# Patient Record
Sex: FEMALE | Race: WHITE | Employment: OTHER | URBAN - METROPOLITAN AREA
[De-identification: names, ages, dates, MRNs, and addresses within clinical notes are randomized per-mention and may not be internally consistent; named-entity substitution may affect disease eponyms.]

---

## 2017-01-03 LAB
ERYTHROCYTE [DISTWIDTH] IN BLOOD BY AUTOMATED COUNT: 14.9 % (ref 11.5–14.5)
HCT VFR BLD AUTO: 31.6 % (ref 36–48)
HEMOGLOBIN: 10.4 G/DL (ref 12–16)
MCH RBC QN AUTO: 30 PG (ref 27–33)
MCHC RBC AUTO-ENTMCNC: 33 G/DL (ref 33–36)
MCV RBC AUTO: 92 FL (ref 80–100)
PLATELET # BLD AUTO: 203 K/UL (ref 150–400)
PMV BLD: 9.8 FL (ref 6.5–12)
RBC # BLD AUTO: 3.45 M/UL (ref 4.2–5.4)
WBC # BLD AUTO: 7.7 K/UL (ref 4.3–10.8)

## 2017-01-04 ENCOUNTER — TRANSFERRED RECORDS (OUTPATIENT)
Dept: HEALTH INFORMATION MANAGEMENT | Facility: CLINIC | Age: 77
End: 2017-01-04

## 2017-01-05 LAB
INR PPP: 1.1 (ref 1–1.2)
PROTHROMBIN TIME: 12.5 SECONDS (ref 10–13)

## 2017-01-06 ENCOUNTER — NURSING HOME VISIT (OUTPATIENT)
Dept: GERIATRICS | Facility: CLINIC | Age: 77
End: 2017-01-06
Payer: MEDICARE

## 2017-01-06 VITALS
WEIGHT: 169 LBS | RESPIRATION RATE: 23 BRPM | OXYGEN SATURATION: 95 % | DIASTOLIC BLOOD PRESSURE: 61 MMHG | SYSTOLIC BLOOD PRESSURE: 147 MMHG | BODY MASS INDEX: 28.12 KG/M2 | TEMPERATURE: 98.6 F | HEART RATE: 60 BPM

## 2017-01-06 DIAGNOSIS — M54.6 ACUTE MIDLINE THORACIC BACK PAIN: ICD-10-CM

## 2017-01-06 DIAGNOSIS — Z95.0 CARDIAC PACEMAKER IN SITU: ICD-10-CM

## 2017-01-06 DIAGNOSIS — E11.59 TYPE 2 DIABETES MELLITUS WITH OTHER CIRCULATORY COMPLICATION, WITHOUT LONG-TERM CURRENT USE OF INSULIN (H): ICD-10-CM

## 2017-01-06 DIAGNOSIS — I48.20 CHRONIC ATRIAL FIBRILLATION (H): ICD-10-CM

## 2017-01-06 DIAGNOSIS — R53.81 PHYSICAL DECONDITIONING: Primary | ICD-10-CM

## 2017-01-06 DIAGNOSIS — Z86.69 HX OF SEIZURE DISORDER: ICD-10-CM

## 2017-01-06 PROCEDURE — 99310 SBSQ NF CARE HIGH MDM 45: CPT | Performed by: NURSE PRACTITIONER

## 2017-01-06 PROCEDURE — 99207 ZZC CDG-CORRECTLY CODED, REVIEWED AND AGREE: CPT | Performed by: NURSE PRACTITIONER

## 2017-01-06 RX ORDER — FOLIC ACID 1 MG/1
1 TABLET ORAL DAILY
COMMUNITY
End: 2017-08-09

## 2017-01-06 RX ORDER — TIMOLOL MALEATE 5 MG/ML
1 SOLUTION/ DROPS OPHTHALMIC 2 TIMES DAILY
COMMUNITY

## 2017-01-06 RX ORDER — ACETAMINOPHEN 325 MG/1
650 TABLET ORAL EVERY 12 HOURS PRN
COMMUNITY
End: 2019-12-30

## 2017-01-06 RX ORDER — GABAPENTIN 300 MG/1
300 CAPSULE ORAL 3 TIMES DAILY
COMMUNITY
End: 2017-07-31

## 2017-01-06 RX ORDER — METOPROLOL TARTRATE 25 MG/1
12.5 TABLET, FILM COATED ORAL 2 TIMES DAILY
COMMUNITY
End: 2017-07-31

## 2017-01-06 RX ORDER — LEVETIRACETAM 500 MG/1
500 TABLET ORAL 2 TIMES DAILY
COMMUNITY

## 2017-01-06 RX ORDER — DIAZEPAM 5 MG
5 TABLET ORAL EVERY 8 HOURS PRN
COMMUNITY
End: 2017-01-18

## 2017-01-06 RX ORDER — LOSARTAN POTASSIUM 50 MG/1
50 TABLET ORAL DAILY
COMMUNITY
End: 2017-07-31

## 2017-01-06 RX ORDER — CYCLOBENZAPRINE HCL 10 MG
10 TABLET ORAL 3 TIMES DAILY PRN
COMMUNITY
End: 2017-07-31

## 2017-01-06 RX ORDER — NITROGLYCERIN 0.4 MG/1
0.4 TABLET SUBLINGUAL EVERY 5 MIN PRN
COMMUNITY

## 2017-01-06 RX ORDER — ASPIRIN 325 MG
325 TABLET ORAL DAILY
COMMUNITY
End: 2017-01-18

## 2017-01-06 RX ORDER — LOPERAMIDE HCL 2 MG
2 CAPSULE ORAL EVERY 4 HOURS PRN
COMMUNITY
End: 2017-07-31

## 2017-01-06 RX ORDER — LORATADINE 10 MG/1
10 TABLET ORAL DAILY PRN
COMMUNITY
End: 2018-05-15

## 2017-01-06 RX ORDER — FUROSEMIDE 40 MG
40 TABLET ORAL DAILY
COMMUNITY
End: 2017-07-31

## 2017-01-06 RX ORDER — LATANOPROST 50 UG/ML
1 SOLUTION/ DROPS OPHTHALMIC AT BEDTIME
COMMUNITY

## 2017-01-06 RX ORDER — DULOXETIN HYDROCHLORIDE 60 MG/1
60 CAPSULE, DELAYED RELEASE ORAL 2 TIMES DAILY
COMMUNITY

## 2017-01-06 RX ORDER — PRAVASTATIN SODIUM 40 MG
40 TABLET ORAL DAILY
COMMUNITY
End: 2017-07-31

## 2017-01-06 NOTE — PROGRESS NOTES
[facility]  PRIMARY CARE PROVIDER AND CLINIC:  [facility] [address] / [facility]*  Chief Complaint   Patient presents with     Hospital F/U       HPI:    Diya Cruz is a 76 year old  (1940),admitted to the [facility]  from [facility].  Hospital stay 12/30/2016 through 1/5/2017.  Admitted to this facility for  rehab, medical management and nursing care. Current issues are:         Physical deconditioning  Chronic atrial fibrillation (H)  Cardiac pacemaker in situ  Type 2 diabetes mellitus with other circulatory complication, without long-term current use of insulin (H)  Acute midline thoracic back pain  Hx of seizure disorder     See assessment / plan for HPI     CODE STATUS/ADVANCE DIRECTIVES DISCUSSION:   DNR only  Patient's living condition: lives in an assisted living facility    ALLERGIES:Ambien; Avandia; Azithromycin; Clindamycin; Codeine; Hydrochlorothiazide w/triamterene; Lisinopril; Nitrofurantoin; Simvastatin; and Sulfa drugs  PAST MEDICAL HISTORY:  has a past medical history of Type II or unspecified type diabetes mellitus without mention of complication, not stated as uncontrolled; Unspecified essential hypertension; Pure hypercholesterolemia; Rheumatoid arthritis(714.0); Peripheral vascular disease, unspecified (H); Unspecified cerebral artery occlusion with cerebral infarction; Obstructive sleep apnea (adult) (pediatric); Unspecified epilepsy without mention of intractable epilepsy; Abdominal aortic stenosis; Depressive disorder; Anxiety; Basal cell carcinoma; Carotid stenosis; Claudication (H); Compression fracture; GI bleed; Glaucoma; Hyperlipidemia; Meniere syndrome; Obesity; Seizure disorder (H); and Sjogren's disease (H).  PAST SURGICAL HISTORY:  has past surgical history that includes tonsillectomy; Hysterectomy; GYN surgery; Breast surgery; Head and neck surgery; Cardiac surgery;  Cardiac surgery; Abdomen surgery; vascular surgery; Phacoemulsification with standard intraocular lens implant (6/26/2014); cataract iol, rt/lt (2010); and Laser YAG capsulotomy (Bilateral, 8/27/2015).  FAMILY HISTORY: family history is not on file.  SOCIAL HISTORY:  reports that she has quit smoking. Her smoking use included Cigarettes. She has a 30 pack-year smoking history. She does not have any smokeless tobacco history on file. She reports that she does not drink alcohol or use illicit drugs.    Post Discharge Medication Reconciliation Status: discharge medications reconciled, continue medications without change.  Current Outpatient Prescriptions   Medication Sig Dispense Refill     acetaminophen (TYLENOL) 325 MG tablet Take 325 mg by mouth every 6 hours as needed for pain       gabapentin (NEURONTIN) 300 MG capsule Take 300 mg by mouth 3 times daily       insulin aspart (NOVOLOG FLEXPEN) 100 UNIT/ML injection Inject Subcutaneous 3 times daily (before meals) Per sliding scale: 120 - 149 = 1 unit, 150 - 199 = 2 units, 200 - 249 = 4 units, 250 - 299 = 6 units, 300 - 349 = 8 units, 350+ = 10 units       insulin aspart (NOVOLOG FLEXPEN) 100 UNIT/ML injection Inject Subcutaneous At Bedtime Per sliding scale: 0 - 149 = 0 units, 150 - 199 = 1 unit, 200 - 249 = 2 units, 250 - 299 = 3 units, 300 - 349 = 4 units, 350+ = 5 units       latanoprost (XALATAN) 0.005 % ophthalmic solution Place 1 drop into both eyes At Bedtime       timolol (TIMOPTIC) 0.5 % ophthalmic solution Place 1 drop into both eyes 2 times daily Hold if HR <60       ranitidine (ZANTAC) 150 MG tablet Take 150 mg by mouth 2 times daily as needed for heartburn       pravastatin (PRAVACHOL) 40 MG tablet Take 40 mg by mouth daily       nitroglycerin (NITROSTAT) 0.4 MG sublingual tablet Place 0.4 mg under the tongue every 5 minutes as needed for chest pain For chest pain place 1 tablet under the tongue every 5 minutes for 3 doses. If symptoms persist 5 minutes  after 1st dose call 911.       losartan (COZAAR) 50 MG tablet Take 50 mg by mouth daily       loperamide (IMODIUM) 2 MG capsule Take 2 mg by mouth every 4 hours as needed for diarrhea       loratadine (CLARITIN) 10 MG tablet Take 10 mg by mouth daily as needed for allergies       levETIRAcetam (KEPPRA) 500 MG tablet Take 500 mg by mouth 2 times daily       furosemide (LASIX) 40 MG tablet Take 40 mg by mouth daily       folic acid (FOLVITE) 1 MG tablet Take 1 mg by mouth daily       DULoxetine (CYMBALTA) 60 MG EC capsule Take 60 mg by mouth 2 times daily       diazepam (VALIUM) 5 MG tablet Take 5 mg by mouth every 8 hours as needed for anxiety or sleep       insulin aspart (NOVOLOG FLEXPEN) 100 UNIT/ML injection Inject 2 Units Subcutaneous 3 times daily (with meals)       metoprolol (LOPRESSOR) 25 MG tablet Take 12.5 mg by mouth 2 times daily       insulin glargine (LANTUS) 100 UNIT/ML injection Inject 5 Units Subcutaneous At Bedtime       cyclobenzaprine (FLEXERIL) 10 MG tablet Take 10 mg by mouth 3 times daily as needed for muscle spasms       aspirin 325 MG tablet Take 325 mg by mouth daily       Warfarin Sodium (COUMADIN PO) Take 6 mg by mouth daily         ROS:  10 point ROS of systems including Constitutional, Eyes, Respiratory, Cardiovascular, Gastroenterology, Genitourinary, Integumentary, Muscularskeletal, Psychiatric were all negative except for pertinent positives noted in my HPI.    Exam:  /61 mmHg  Pulse 60  Temp(Src) 98.6  F (37  C)  Resp 23  Wt 169 lb (76.658 kg)  SpO2 95%  GENERAL APPEARANCE:  Alert, in no distress  RESP:  respiratory effort and palpation of chest normal, no respiratory distress, lungs sounds clear  CV:  Palpation and auscultation of heart done , regular rate and rhythm, no murmur, rub, or gallop, edema none  ABDOMEN:  normal bowel sounds, soft, nontender, no hepatosplenomegaly or other masses  M/S:  Gait and station w/c working with therapy, no tenderness or swelling of  the joints   SKIN:  Bilateral chest incisions overed with steristrips no obvious signs of infection  PSYCH:  insight and judgement, memory intact, affect and mood normal    Lab/Diagnostic data:  WBC   Date Value Ref Range Status   01/03/2017 7.7 4.3 - 10.8 K/uL Final   ]  HEMOGLOBIN   Date Value Ref Range Status   01/03/2017 10.4* 12.0 - 16.0 g/dL Final     ASSESSMENT/PLAN:  Physical deconditioning  Due to hospital stay and hx of falls with syncopal episodes    Chronic atrial fibrillation (H)  Cardiac pacemaker in situ  Pacemaker placed at [facility] due to sick sinus syndrome with ventricular fibrillation, and bradycardia into the 30s. Heart Rate consistently 60. Hx of CVA. On low dose metoprolol and warfarin for stroke prevention     Type 2 diabetes mellitus with other circulatory complication, without long-term current use of insulin (H)  Not well controlled at baseline per patient report. Currently on lantus 5 units and novlog per sliding scale.   Home regimen is lantus 5 units once daily and novolog per carb.   Blood sugars controlled. No lows.    Acute midline thoracic back pain  Hx of compression fracture with one her falls. Pain currently managed with gabapentin and flexeril.   - no changes     Hx of seizure disorder  Maintained on keppra for hx of seizures following remicade infusions.       Orders:  No changes      Electronically signed by:  Manda Molina NP

## 2017-01-10 ENCOUNTER — NURSING HOME VISIT (OUTPATIENT)
Dept: GERIATRICS | Facility: CLINIC | Age: 77
End: 2017-01-10
Payer: MEDICARE

## 2017-01-10 VITALS
WEIGHT: 169 LBS | TEMPERATURE: 96.8 F | HEART RATE: 60 BPM | SYSTOLIC BLOOD PRESSURE: 138 MMHG | BODY MASS INDEX: 28.12 KG/M2 | DIASTOLIC BLOOD PRESSURE: 68 MMHG | RESPIRATION RATE: 20 BRPM

## 2017-01-10 DIAGNOSIS — E11.59 TYPE 2 DIABETES MELLITUS WITH OTHER CIRCULATORY COMPLICATION, WITHOUT LONG-TERM CURRENT USE OF INSULIN (H): Primary | ICD-10-CM

## 2017-01-10 PROCEDURE — 99207 ZZC CDG-CORRECTLY CODED, REVIEWED AND AGREE: CPT | Performed by: NURSE PRACTITIONER

## 2017-01-10 PROCEDURE — 99308 SBSQ NF CARE LOW MDM 20: CPT | Performed by: NURSE PRACTITIONER

## 2017-01-10 NOTE — PROGRESS NOTES
[facility]    Chief Complaint   Patient presents with     Nursing Home Acute       HPI:    Diya Cruz is a 76 year old  (1940), who is being seen today for an episodic care visit at [facility]. Today's concern is:  1. Type 2 diabetes mellitus with other circulatory complication, without long-term current use of insulin (H)    Historically per patient blood sugars are all over the place during the day.  Reports they range between 70-80's in the morning and are close to what they are running currently.  Eileens ranges at the TCU are in the -259, 12p 176-404, 5p 104-217, and -217.    REVIEW OF SYSTEMS:  4 point ROS including Respiratory, CV, GI and , other than that noted in the HPI,  is negative    /68 mmHg  Pulse 60  Temp(Src) 96.8  F (36  C) (Tympanic)  Resp 20  Wt 169 lb (76.658 kg)  GENERAL APPEARANCE:  Alert, in no distress    ASSESSMENT/PLAN:  (E11.59) Type 2 diabetes mellitus with other circulatory complication, without long-term current use of insulin (H)  (primary encounter diagnosis)  Comment: Higher blood sugar ranges in the AM and at noon  Plan:  -Increase Lantus to 7 units SQ at bedtime  -Increase scheduled Novolog to 4 units SQ TID with meals      Time: 15 minutes    Yeimi GALLO    Provider Disclosure:  This patient was seen along with a nurse practitioner student. The histories and reviews of systems were obtained by them and confirmed by myself. All objective, assessments and plans were completed by myself.    Electronically signed by:  Manda Molina NP

## 2017-01-11 ENCOUNTER — NURSING HOME VISIT (OUTPATIENT)
Dept: FAMILY MEDICINE | Facility: OTHER | Age: 77
End: 2017-01-11
Payer: MEDICARE

## 2017-01-11 VITALS
SYSTOLIC BLOOD PRESSURE: 131 MMHG | HEART RATE: 62 BPM | TEMPERATURE: 98 F | WEIGHT: 169 LBS | RESPIRATION RATE: 21 BRPM | OXYGEN SATURATION: 95 % | DIASTOLIC BLOOD PRESSURE: 76 MMHG | BODY MASS INDEX: 28.12 KG/M2

## 2017-01-11 DIAGNOSIS — I10 ESSENTIAL HYPERTENSION: ICD-10-CM

## 2017-01-11 DIAGNOSIS — G40.909 SEIZURE DISORDER (H): ICD-10-CM

## 2017-01-11 DIAGNOSIS — I49.5 SICK SINUS SYNDROME (H): Primary | ICD-10-CM

## 2017-01-11 DIAGNOSIS — F41.9 ANXIETY: ICD-10-CM

## 2017-01-11 DIAGNOSIS — E11.65 TYPE 2 DIABETES MELLITUS WITH HYPERGLYCEMIA, UNSPECIFIED LONG TERM INSULIN USE STATUS: ICD-10-CM

## 2017-01-11 PROCEDURE — 99305 1ST NF CARE MODERATE MDM 35: CPT | Performed by: FAMILY MEDICINE

## 2017-01-11 RX ORDER — ACETAMINOPHEN 500 MG
1000 TABLET ORAL 3 TIMES DAILY
COMMUNITY
End: 2017-07-31

## 2017-01-13 PROBLEM — I49.5 SICK SINUS SYNDROME (H): Status: ACTIVE | Noted: 2017-01-13

## 2017-01-13 NOTE — ASSESSMENT & PLAN NOTE
Sugars fluctuate from 100-400s, insulin was just increased yesterday, continue to monitor and adjust insulin as needed.

## 2017-01-13 NOTE — ASSESSMENT & PLAN NOTE
Pacemaker placed at [facility] due to sick sinus syndrome with ventricular fibrillation, and bradycardia into the 30s. Heart Rate consistently 60. Hx of CVA. On low dose metoprolol and warfarin for stroke prevention.

## 2017-01-13 NOTE — PROGRESS NOTES
HISTORY OF PRESENT ILLNESS:  Diya is a 76 year old female, (1940), admitted to [facility] from [facility].  Hospital stay 12/30/2016 through 1/5/2017.  Admitted to this facility for  rehab, medical management and nursing care.     Past Medical History/  Patient Active Problem List    Diagnosis Date Noted     Sick sinus syndrome (H) 01/13/2017     Priority: Medium     Nondiabetic proliferative retinopathy 08/08/2016     Priority: Medium     Chronic back pain 03/29/2016     Priority: Medium     Multiple-type hyperlipidemia 01/26/2016     Priority: Medium     Mass of breast 07/23/2015     Priority: Medium     Fatigue 07/22/2015     Priority: Medium     Abnormal computed tomography scan 07/10/2015     Priority: Medium     Overview:   Lung nodules. Following with Dr. Pennington, due for repeat CT 1/2016       Stenosis of carotid artery 06/12/2015     Priority: Medium     Candidal intertrigo 04/10/2015     Priority: Medium     Microalbuminuria 01/26/2015     Priority: Medium     Mild episode of recurrent major depressive disorder (H) 01/26/2015     Priority: Medium     Type 2 diabetes mellitus (H) 01/26/2015     Priority: Medium     Essential hypertension 01/23/2015     Priority: Medium     Spinal stenosis of lumbar region 01/23/2015     Priority: Medium     History of adenomatous polyp of colon 11/22/2013     Priority: Medium     History of endovascular stent graft for abdominal aortic aneurysm 11/22/2013     Priority: Medium     Anxiety 10/22/2013     Priority: Medium     Paroxysmal atrial fibrillation (H) 09/18/2013     Priority: Medium     Overview:   On coumadin       Seasonal allergic rhinitis 09/03/2013     Priority: Medium     Seizure disorder (H) 09/03/2013     Priority: Medium     Overview:   Possible reaction to Remicade, takes Keppra       Cerebrovascular accident (CVA) (H) 08/19/2013     Priority: Medium     Glaucoma 08/19/2013     Priority: Medium     Hypoferremia  08/19/2013     Priority: Medium     Overview:   Source in distal small bowel per patient (identified on pill cam). History transfusions.       Obstructive sleep apnea syndrome 08/19/2013     Priority: Medium     Overview:   Unable to tolerate CPAP       Peripheral arterial occlusive disease (H) 08/19/2013     Priority: Medium     Overview:   8/8/01 - 18-mm infrarenal aortic stent with angioplasty   8/23/01, Angioplasty Rt prox SFA, thrombolysis Rt CFA and SFA.  7/14/11 - bilateral GLENN stents       Rheumatoid arthritis (H) 08/19/2013     Priority: Medium     Overview:   Dr. Trevino            Social History     Social History     Marital Status:      Spouse Name: N/A     Number of Children: N/A     Years of Education: N/A     Occupational History     Not on file.     Social History Main Topics     Smoking status: Former Smoker -- 1.00 packs/day for 30 years     Types: Cigarettes     Smokeless tobacco: Not on file     Alcohol Use: No     Drug Use: No     Sexual Activity: No     Other Topics Concern     Not on file     Social History Narrative        Current Outpatient Prescriptions   Medication Sig     acetaminophen (TYLENOL) 500 MG tablet Take 1,000 mg by mouth 3 times daily     acetaminophen (TYLENOL) 325 MG tablet Take 325 mg by mouth every 6 hours as needed for pain     gabapentin (NEURONTIN) 300 MG capsule Take 300 mg by mouth 3 times daily     insulin aspart (NOVOLOG FLEXPEN) 100 UNIT/ML injection Inject Subcutaneous 3 times daily (before meals) Per sliding scale: 120 - 149 = 1 unit, 150 - 199 = 2 units, 200 - 249 = 4 units, 250 - 299 = 6 units, 300 - 349 = 8 units, 350+ = 10 units     insulin aspart (NOVOLOG FLEXPEN) 100 UNIT/ML injection Inject Subcutaneous At Bedtime Per sliding scale: 0 - 149 = 0 units, 150 - 199 = 1 unit, 200 - 249 = 2 units, 250 - 299 = 3 units, 300 - 349 = 4 units, 350+ = 5 units     latanoprost (XALATAN) 0.005 % ophthalmic solution Place 1 drop into both eyes At Bedtime     timolol  (TIMOPTIC) 0.5 % ophthalmic solution Place 1 drop into both eyes 2 times daily Hold if HR <60     ranitidine (ZANTAC) 150 MG tablet Take 150 mg by mouth 2 times daily as needed for heartburn     pravastatin (PRAVACHOL) 40 MG tablet Take 40 mg by mouth daily     nitroglycerin (NITROSTAT) 0.4 MG sublingual tablet Place 0.4 mg under the tongue every 5 minutes as needed for chest pain For chest pain place 1 tablet under the tongue every 5 minutes for 3 doses. If symptoms persist 5 minutes after 1st dose call 911.     losartan (COZAAR) 50 MG tablet Take 50 mg by mouth daily     loperamide (IMODIUM) 2 MG capsule Take 2 mg by mouth every 4 hours as needed for diarrhea     loratadine (CLARITIN) 10 MG tablet Take 10 mg by mouth daily as needed for allergies     levETIRAcetam (KEPPRA) 500 MG tablet Take 500 mg by mouth 2 times daily     furosemide (LASIX) 40 MG tablet Take 40 mg by mouth daily     folic acid (FOLVITE) 1 MG tablet Take 1 mg by mouth daily     DULoxetine (CYMBALTA) 60 MG EC capsule Take 60 mg by mouth 2 times daily     diazepam (VALIUM) 5 MG tablet Take 5 mg by mouth every 8 hours as needed for anxiety or sleep     insulin aspart (NOVOLOG FLEXPEN) 100 UNIT/ML injection Inject 4 Units Subcutaneous 3 times daily (with meals)      metoprolol (LOPRESSOR) 25 MG tablet Take 12.5 mg by mouth 2 times daily     insulin glargine (LANTUS) 100 UNIT/ML injection Inject 7 Units Subcutaneous At Bedtime      cyclobenzaprine (FLEXERIL) 10 MG tablet Take 10 mg by mouth 3 times daily as needed for muscle spasms     aspirin 325 MG tablet Take 325 mg by mouth daily     Warfarin Sodium (COUMADIN PO) Take 6 mg by mouth daily     No current facility-administered medications for this visit.       Allergies   Allergen Reactions     Ambien [Zolpidem] Other (See Comments)     Avandia [Rosiglitazone] Hives     Azithromycin Diarrhea     Clindamycin Diarrhea     Codeine Nausea and Vomiting     Hydrochlorothiazide W/Triamterene Swelling      Lisinopril Cough     Nitrofurantoin Diarrhea     Simvastatin Diarrhea     Sulfa Drugs      Lightheaded, GI upset       I have reviewed the care plan and do agree with the plan.    Information reviewed:  Medications, vital signs, orders, and nursing notes.    ROS:  No chest pain, shortness of breath, fevers, chills, headache, nausea, vomiting, dysuria or bowel abnormalities.  Appetite is  normal.  She feels her strength is improving.    OBJECTIVE:  /76 mmHg  Pulse 62  Temp(Src) 98  F (36.7  C)  Resp 21  Wt 169 lb (76.658 kg)  SpO2 95%  GENERAL APPEARANCE:  Alert, in no distress  RESP:  respiratory effort normal, no respiratory distress, lungs sounds clear  CV:  regular rate and rhythm, no murmur, pacemaker incision site clean/dry/intact without erythema or discharge, edema none  ABDOMEN:  normal bowel sounds, soft, nontender, no masses  M/S:  Gait and station w/c working with therapy, no tenderness or swelling of the joints    PSYCH:  insight and judgement, memory intact, affect and mood normal    ASSESSMENT/PLAN:    Sick sinus syndrome (H)  Pacemaker placed at [facility] due to sick sinus syndrome with ventricular fibrillation, and bradycardia into the 30s. Heart Rate consistently 60. Hx of CVA. On low dose metoprolol and warfarin for stroke prevention.    Type 2 diabetes mellitus (H)  Sugars fluctuate from 100-400s, insulin was just increased yesterday, continue to monitor and adjust insulin as needed.    Anxiety  Stable on Cymbalta.    Essential hypertension  Controlled on current medication.    Seizure disorder (H)  Maintained on keppra for hx of seizures following remicade infusions.        Total time spent with patient visit was 25 min including patient visit, review of current treatment plans, and review of past records.     Aixa Esparza MD

## 2017-01-18 ENCOUNTER — DISCHARGE SUMMARY NURSING HOME (OUTPATIENT)
Dept: GERIATRICS | Facility: CLINIC | Age: 77
End: 2017-01-18
Payer: MEDICARE

## 2017-01-18 VITALS
HEART RATE: 60 BPM | BODY MASS INDEX: 28.29 KG/M2 | TEMPERATURE: 98.9 F | RESPIRATION RATE: 14 BRPM | DIASTOLIC BLOOD PRESSURE: 73 MMHG | WEIGHT: 170 LBS | SYSTOLIC BLOOD PRESSURE: 169 MMHG

## 2017-01-18 DIAGNOSIS — E11.59 TYPE 2 DIABETES MELLITUS WITH OTHER CIRCULATORY COMPLICATION, WITHOUT LONG-TERM CURRENT USE OF INSULIN (H): ICD-10-CM

## 2017-01-18 DIAGNOSIS — I48.20 CHRONIC ATRIAL FIBRILLATION (H): ICD-10-CM

## 2017-01-18 DIAGNOSIS — Z86.69 HX OF SEIZURE DISORDER: ICD-10-CM

## 2017-01-18 DIAGNOSIS — R53.81 PHYSICAL DECONDITIONING: Primary | ICD-10-CM

## 2017-01-18 DIAGNOSIS — I10 ESSENTIAL HYPERTENSION: ICD-10-CM

## 2017-01-18 DIAGNOSIS — M54.6 ACUTE MIDLINE THORACIC BACK PAIN: ICD-10-CM

## 2017-01-18 DIAGNOSIS — Z95.0 CARDIAC PACEMAKER IN SITU: ICD-10-CM

## 2017-01-18 PROCEDURE — 99207 ZZC CDG-CORRECTLY CODED, REVIEWED AND AGREE: CPT | Performed by: NURSE PRACTITIONER

## 2017-01-18 PROCEDURE — 99316 NF DSCHRG MGMT 30 MIN+: CPT | Performed by: NURSE PRACTITIONER

## 2017-01-18 RX ORDER — METOPROLOL TARTRATE 25 MG/1
12.5 TABLET, FILM COATED ORAL 2 TIMES DAILY
Qty: 60 TABLET | Refills: 0 | Status: SHIPPED | OUTPATIENT
Start: 2017-01-18 | End: 2017-07-31

## 2017-01-18 RX ORDER — CYCLOBENZAPRINE HCL 10 MG
10 TABLET ORAL 3 TIMES DAILY PRN
Qty: 42 TABLET | Refills: 0 | Status: SHIPPED | OUTPATIENT
Start: 2017-01-18 | End: 2017-07-31

## 2017-01-18 NOTE — PROGRESS NOTES
[facility] DISCHARGE SUMMARY    PATIENT'S NAME: Diya Cruz  YOB: 1940  MEDICAL RECORD NUMBER:  [id number]    PRIMARY CARE PROVIDER AND CLINIC RESPONSIBLE AFTER TRANSFER: Krystle Sen [facility] [address] / [place]*     CODE STATUS/ADVANCE DIRECTIVES DISCUSSION:   DNR       Allergies   Allergen Reactions     Ambien [Zolpidem] Other (See Comments)     Avandia [Rosiglitazone] Hives     Azithromycin Diarrhea     Clindamycin Diarrhea     Codeine Nausea and Vomiting     Hydrochlorothiazide W/Triamterene Swelling     Lisinopril Cough     Nitrofurantoin Diarrhea     Simvastatin Diarrhea     Sulfa Drugs      Lightheaded, GI upset       TRANSFERRING PROVIDERS: Manda Molina NP, Aixa Esparza MD  DATE OF SNF ADMISSION:  January / 05 / 2017  DATE OF SNF (anticipated) DISCHARGE: January / 21 / 2017  DISCHARGE DISPOSITION: Non-FMG Provider   Nursing Facility: [facility] stay 12/30/2016 to 1/5/2017.     Condition on Discharge:  Stable.  Function:  Ambulates   Cognitive Scores: bims 15/15  Equipment: walker    DISCHARGE DIAGNOSIS:   1. Physical deconditioning    2. Chronic atrial fibrillation (H)    3. Cardiac pacemaker in situ    4. Type 2 diabetes mellitus with other circulatory complication, without long-term current use of insulin (H)    5. Acute midline thoracic back pain    6. Hx of seizure disorder    7. Essential hypertension        HPI Nursing Facility Course:    Physical deconditioning  Frequent falls with sick sinus syndrome  Will discharge to home with Physical Therapy/ Ocupational Therapy /RN / HHA    Chronic atrial fibrillation (H)  Rate controlled with metoprolol - script sent to pharmacy as she was on a higher dose at home.  Warfarin dosing has been stable at her previous home dose of 5 mg every day.  LAST few INR: 1/16 - 2.6  1/8 2.0  NEXT INR DUE: 1/23    Cardiac pacemaker in  situ  Hx of sick sinus syndrome  - follow up as directed    Type 2 diabetes mellitus with other circulatory complication, without long-term current use of insulin (H)  Currently on 7 units once daily SQ of lantus and sliding scale insulin  - OK to go back to her previous regimen.  - check  Blood sugars four times daily, keep a log and discuss it with your primary care provider    Acute midline thoracic back pain  Hx of compression fracture, script for flexeril sent to pharmacy.   - continue gabapentin - no changes     Hx of seizure disorder  No seizure while at TCU  - Continues on keppra    Hypertension  Blood pressures controled with metoprolol, lasix and losartan  - no changes made    PAST MEDICAL HISTORY:  has a past medical history of Type II or unspecified type diabetes mellitus without mention of complication, not stated as uncontrolled; Unspecified essential hypertension; Pure hypercholesterolemia; Rheumatoid arthritis(714.0); Peripheral vascular disease, unspecified (H); Unspecified cerebral artery occlusion with cerebral infarction; Obstructive sleep apnea (adult) (pediatric); Unspecified epilepsy without mention of intractable epilepsy; Abdominal aortic stenosis; Depressive disorder; Anxiety; Basal cell carcinoma; Carotid stenosis; Claudication (H); Compression fracture; GI bleed; Glaucoma; Hyperlipidemia; Meniere syndrome; Obesity; Seizure disorder (H); and Sjogren's disease (H).    DISCHARGE MEDICATIONS:  Current Outpatient Prescriptions   Medication Sig Dispense Refill     cyclobenzaprine (FLEXERIL) 10 MG tablet Take 1 tablet (10 mg) by mouth 3 times daily as needed for muscle spasms 42 tablet 0     metoprolol (LOPRESSOR) 25 MG tablet Take 0.5 tablets (12.5 mg) by mouth 2 times daily 60 tablet 0     acetaminophen (TYLENOL) 500 MG tablet Take 1,000 mg by mouth 3 times daily       acetaminophen (TYLENOL) 325 MG tablet Take 325 mg by mouth every 6 hours as needed for pain       gabapentin (NEURONTIN) 300  MG capsule Take 300 mg by mouth 3 times daily       insulin aspart (NOVOLOG FLEXPEN) 100 UNIT/ML injection Inject Subcutaneous 3 times daily (before meals) Per sliding scale: 120 - 149 = 1 unit, 150 - 199 = 2 units, 200 - 249 = 4 units, 250 - 299 = 6 units, 300 - 349 = 8 units, 350+ = 10 units       insulin aspart (NOVOLOG FLEXPEN) 100 UNIT/ML injection Inject Subcutaneous At Bedtime Per sliding scale: 0 - 149 = 0 units, 150 - 199 = 1 unit, 200 - 249 = 2 units, 250 - 299 = 3 units, 300 - 349 = 4 units, 350+ = 5 units       latanoprost (XALATAN) 0.005 % ophthalmic solution Place 1 drop into both eyes At Bedtime       timolol (TIMOPTIC) 0.5 % ophthalmic solution Place 1 drop into both eyes 2 times daily Hold if HR <60       ranitidine (ZANTAC) 150 MG tablet Take 150 mg by mouth 2 times daily as needed for heartburn       pravastatin (PRAVACHOL) 40 MG tablet Take 40 mg by mouth daily       nitroglycerin (NITROSTAT) 0.4 MG sublingual tablet Place 0.4 mg under the tongue every 5 minutes as needed for chest pain For chest pain place 1 tablet under the tongue every 5 minutes for 3 doses. If symptoms persist 5 minutes after 1st dose call 911.       losartan (COZAAR) 50 MG tablet Take 50 mg by mouth daily       loperamide (IMODIUM) 2 MG capsule Take 2 mg by mouth every 4 hours as needed for diarrhea       loratadine (CLARITIN) 10 MG tablet Take 10 mg by mouth daily as needed for allergies       levETIRAcetam (KEPPRA) 500 MG tablet Take 500 mg by mouth 2 times daily       furosemide (LASIX) 40 MG tablet Take 40 mg by mouth daily       folic acid (FOLVITE) 1 MG tablet Take 1 mg by mouth daily       DULoxetine (CYMBALTA) 60 MG EC capsule Take 60 mg by mouth 2 times daily       insulin aspart (NOVOLOG FLEXPEN) 100 UNIT/ML injection Inject 4 Units Subcutaneous 3 times daily (with meals)        metoprolol (LOPRESSOR) 25 MG tablet Take 12.5 mg by mouth 2 times daily       insulin glargine (LANTUS) 100 UNIT/ML injection Inject 7  Units Subcutaneous At Bedtime        cyclobenzaprine (FLEXERIL) 10 MG tablet Take 10 mg by mouth 3 times daily as needed for muscle spasms       Warfarin Sodium (COUMADIN PO) Take 6 mg by mouth daily         MEDICATION CHANGES/RATIONALE:   Lantus increased from 5 units to 7 units due to elevated blood sugars  Valium discontinued due to duplicate therapy and not using  D/C aspirin - on warfarin    Controlled medications sent with patient: not applicable/none     ROS:    4 point ROS including Respiratory, CV, GI and , other than that noted in the HPI,  is negative    Physical Exam:   Vitals: /73 mmHg  Pulse 60  Temp(Src) 98.9  F (37.2  C)  Resp 14  Wt 170 lb (77.111 kg)  BMI= Body mass index is 28.29 kg/(m^2).    GENERAL APPEARANCE:  Alert, in no distress    DISCHARGE PLAN:  Occupational Therapy, Physical Therapy, Registered Nurse and Home Health Aide  Patient instructed to follow-up with:  PCP in 7 days      [facility] scheduled appointments:      Pending labs: None  SNF labs:  WBC      7.7   1/3/2017  RBC     3.45   1/3/2017  HGB     10.4   1/3/2017  HCT     31.6   1/3/2017  MCV       92   1/3/2017  MCH       30   1/3/2017  MCHC       33   1/3/2017  RDW     14.9   1/3/2017  PLT      203   1/3/2017       Discharge Treatments:  1. D/C Valium  2. D/C Aspirin  3. Scripts for Flexeril and Metoprolol sent to [facility] in [place]  4. Ok to discharge to home with current medications and treatments  5. Follow up with primary care provider in 1 week    TOTAL DISCHARGE TIME:   Greater than 30 minutes  Electronically signed by:  Manda Molina NP

## 2017-01-24 ENCOUNTER — TELEPHONE (OUTPATIENT)
Dept: FAMILY MEDICINE | Facility: OTHER | Age: 77
End: 2017-01-24

## 2017-01-26 ENCOUNTER — CARE COORDINATION (OUTPATIENT)
Dept: CARE COORDINATION | Facility: CLINIC | Age: 77
End: 2017-01-26

## 2017-01-26 NOTE — PROGRESS NOTES
Clinic Care Coordination Contact  [facility]/Voicemail    Referral Source: IP/TCU Report    Clinical Data: Care Coordinator Outreach:  Pt discharged from [facility] on 1/21/17.  Pt with a  Complex medical history significant for chronic a-fib, cardiac pacemaker, type II diabetes, seizure disorder, HTN.  Per chart review pt discharged home to an indpendent [facility] with home care services - RN, PT, OT and HHA.   Noted per chart that this patient has not been seen at the [facility] so will need to confirm PCP/Clinic.      Outreach attempted x 1.  Left message on voicemail with call back information and requested return call.    Plan: Care Coordinator will mail out care coordination introduction letter with care coordinator contact information and explanation of care coordination services. Care Coordinator will try to reach patient again in 1-2 business days.      Yael Francois RN BSN, PHN RN Care Coordinator  [facility]  [email or web address]  [phone]  1/26/2017 11:19 AM

## 2017-01-26 NOTE — Clinical Note
[address]  [address]    January 26, 2017    Diya Cruz  [address]  [place] [address]    Dear Diya,  I am the Clinic Care Coordinator that works with your primary care provider's clinic. I have been trying to reach you recently to introduce Clinic Care Coordination and to see if there was anything I could assist you with.  Below is a description of what Clinic Care Coordination is and how I can further assist you.     The Clinic Care Coordinator role is a Registered Nurse and/or  who understands the health care system. The goal of Clinic Care Coordination is to help you manage your health and improve access to the [facility] in the most efficient manner.  The Registered Nurse can assist you in meeting your health care goals by providing education, coordinating services, and strengthening the communication among your providers. The  can assist you with financial, behavioral, psychosocial, and chemical dependency and counseling/psychiatric resources.    Please feel free to keep this letter and contact information to contact me at [phone] with any further questions or concerns that may arise. We at [facility] are focused on providing you with the highest-quality healthcare experience possible and that all starts with you.       Sincerely,     Yael Francois, RN BSN, PHN RN Care Coordinator  [facility] [place]  [email or web address]  [phone]  1/26/2017 11:21 AM      Enclosed: I have enclosed a copy of a 24 Hour Access Plan. This has helpful phone numbers for you to call when needed. Please keep this in an easy to access place to use as needed.

## 2017-01-26 NOTE — Clinical Note
My Access Plan    Presenting Problem Signs and Symptoms Treatment Plan   Questions or concerns   during clinic hours   I will call my clinic directly:   [facility]   [address] [place] [address]   [phone]   Questions or concerns outside clinic hours   I will call the 24 hour nurse line at   [phone] or [phone].   Need to schedule an appointment   I will call the 24 hour scheduling team at [phone] or my clinic directly at [phone].   Same day treatment     I will call my clinic first, nurse line if after hours, urgent care and express care if needed.   Clinic Care Coordinators   (regular clinic hours)      I will call a clinic care coordinator directly:     Sohail Corona RN   [phone]  GREGORIO Edmond:    [phone]       Crisis Services: Behavioral or Mental Health Thoughts of harming self or others. Crisis Connection 24 Hour Phone Line  [phone]    [facility] 24 Hour Crisis Services  [phone]    P (Behavioral Health Providers) Network [phone]    [facility]   [phone]     Emergency treatment -- Immediately    CAll [phone]

## 2017-01-31 NOTE — PROGRESS NOTES
Clinic Care Coordination Contact  Care Team Conversations    Clinical Data: Spoke with pt regarding [facility].  Pt confirmed that she open to home care - RN, PT, OT and HHA services.  Pt noted no concerns or needs at this time.  Pt has transferred her care to [facility] in [place].      Plan:  Will do no further outreach.    Yael Francois RN BSN, PHN RN Care Coordinator  [facility]  [email or web address]  [phone]  1/31/2017 12:39 PM

## 2017-07-29 ENCOUNTER — TELEPHONE (OUTPATIENT)
Dept: GERIATRICS | Facility: CLINIC | Age: 77
End: 2017-07-29

## 2017-07-29 NOTE — TELEPHONE ENCOUNTER
Diabetic. . Only gets 2 units Novolog scheduled at dinner.     PLAN  Give 5 units Novolog now. Recheck in one hour. Update provider if still over 350    Electronically signed by MABEL Restrepo GNP

## 2017-07-29 NOTE — TELEPHONE ENCOUNTER
Patient new admit from [facility], hospitalized due to afib, NSTEMI and weakness/dyspnea. Continues with feeling dyspnea and fatigue since admission. VSS, sats 97% on room air and lungs clear per nurse. INR today 2.4 - held the past two days. Usual home dose appears to be 5 mg daily.     PLAN  CBC, BMP today or tomorrow diagnosis fatigue  Coumadin 2.5 mg PO today. INR tomorrow.   Monitor patient, update with any status changes.     Electronically signed by MABEL Restrepo GNP

## 2017-07-30 ENCOUNTER — TELEPHONE (OUTPATIENT)
Dept: GERIATRICS | Facility: CLINIC | Age: 77
End: 2017-07-30

## 2017-07-30 NOTE — TELEPHONE ENCOUNTER
Patient with pains and aches - no PRN Tylenol order. In hospital apparently Tylenol stopped and LFTs ordered - results not available. No hx of known liver disease.     PLAN  OK to use PRN Tylenol 650 mg PO QID PRN pain.     Electronically signed by MABEL Restrepo GNP

## 2017-07-31 ENCOUNTER — NURSING HOME VISIT (OUTPATIENT)
Dept: GERIATRICS | Facility: CLINIC | Age: 77
End: 2017-07-31
Payer: MEDICARE

## 2017-07-31 VITALS
WEIGHT: 179 LBS | BODY MASS INDEX: 29.79 KG/M2 | SYSTOLIC BLOOD PRESSURE: 135 MMHG | RESPIRATION RATE: 18 BRPM | HEART RATE: 61 BPM | DIASTOLIC BLOOD PRESSURE: 65 MMHG | TEMPERATURE: 97.9 F

## 2017-07-31 DIAGNOSIS — I48.20 CHRONIC ATRIAL FIBRILLATION (H): ICD-10-CM

## 2017-07-31 DIAGNOSIS — E11.59 TYPE 2 DIABETES MELLITUS WITH OTHER CIRCULATORY COMPLICATION, WITHOUT LONG-TERM CURRENT USE OF INSULIN (H): ICD-10-CM

## 2017-07-31 DIAGNOSIS — N18.30 CKD (CHRONIC KIDNEY DISEASE) STAGE 3, GFR 30-59 ML/MIN (H): ICD-10-CM

## 2017-07-31 DIAGNOSIS — I10 BENIGN ESSENTIAL HYPERTENSION: ICD-10-CM

## 2017-07-31 DIAGNOSIS — R94.5 NONSPECIFIC ABNORMAL RESULTS OF LIVER FUNCTION STUDY: ICD-10-CM

## 2017-07-31 DIAGNOSIS — G47.33 OSA (OBSTRUCTIVE SLEEP APNEA): ICD-10-CM

## 2017-07-31 DIAGNOSIS — Z86.69 HX OF SEIZURE DISORDER: ICD-10-CM

## 2017-07-31 DIAGNOSIS — D64.9 ANEMIA, UNSPECIFIED TYPE: ICD-10-CM

## 2017-07-31 DIAGNOSIS — R53.81 PHYSICAL DECONDITIONING: Primary | ICD-10-CM

## 2017-07-31 DIAGNOSIS — I50.32 CHRONIC DIASTOLIC CONGESTIVE HEART FAILURE (H): ICD-10-CM

## 2017-07-31 PROBLEM — R79.89 ELEVATED LFTS: Status: ACTIVE | Noted: 2017-07-31

## 2017-07-31 PROCEDURE — 99310 SBSQ NF CARE HIGH MDM 45: CPT | Performed by: NURSE PRACTITIONER

## 2017-07-31 NOTE — PROGRESS NOTES
[facility]  PRIMARY CARE PROVIDER AND CLINIC:  Krystle Sen [facility] [address] / [facility]*  Chief Complaint   Patient presents with     Hospital F/U       HPI:    Diya Cruz is a 77 year old  (1940),[facility].  Hospital stay 7/23 through 7/28.  Admitted to this facility for  rehab, medical management and nursing care.  HPI information obtained from: facility chart records, facility staff, patient report and [facility] chart review.      Current issues are:         Physical deconditioning  Chronic atrial fibrillation (H)  Hx of seizure disorder  Benign essential hypertension  Chronic diastolic congestive heart failure (H)  Type 2 diabetes mellitus with other circulatory complication, without long-term current use of insulin (H)  FLORA (obstructive sleep apnea)  CKD (chronic kidney disease) stage 3, GFR 30-59 ml/min  Anemia, unspecified type  Nonspecific abnormal results of liver function study    CODE STATUS/ADVANCE DIRECTIVES DISCUSSION:   DNR / DNI  Patient's living condition: lives alone    ALLERGIES:Ambien [zolpidem]; Avandia [rosiglitazone]; Azithromycin; Clindamycin; Codeine; Hydrochlorothiazide w/triamterene; Lisinopril; Nitrofurantoin; Simvastatin; and Sulfa drugs  PAST MEDICAL HISTORY:  has a past medical history of Abdominal aortic stenosis; Anxiety; Basal cell carcinoma; Carotid stenosis; Claudication (H); Compression fracture; Depressive disorder; GI bleed; Glaucoma; Hyperlipidemia; Meniere syndrome; Obesity; Obstructive sleep apnea (adult) (pediatric); Peripheral vascular disease, unspecified (H); Pure hypercholesterolemia; Rheumatoid arthritis(714.0); Seizure disorder (H); Sjogren's disease (H); Type II or unspecified type diabetes mellitus without mention of complication, not stated as uncontrolled; Unspecified cerebral artery occlusion with cerebral infarction;  Unspecified epilepsy without mention of intractable epilepsy; and Unspecified essential hypertension.  PAST SURGICAL HISTORY:  has a past surgical history that includes tonsillectomy; Hysterectomy; GYN surgery; Breast surgery; Head and neck surgery; Cardiac surgery; Cardiac surgery; Abdomen surgery; vascular surgery; Phacoemulsification with standard intraocular lens implant (6/26/2014); cataract iol, rt/lt (2010); and Laser YAG capsulotomy (Bilateral, 8/27/2015).  FAMILY HISTORY: family history is not on file.  SOCIAL HISTORY:  reports that she has quit smoking. Her smoking use included Cigarettes. She has a 30.00 pack-year smoking history. She does not have any smokeless tobacco history on file. She reports that she does not drink alcohol or use illicit drugs.    Post Discharge Medication Reconciliation Status: discharge medications reconciled, continue medications without change.  Current Outpatient Prescriptions   Medication Sig Dispense Refill     OMEPRAZOLE PO Take 20 mg by mouth daily as needed       METOPROLOL TARTRATE PO Take 50 mg by mouth 2 times daily       GABAPENTIN PO Take 200 mg by mouth 3 times daily       AMIODARONE HCL PO Take 400 mg by mouth 2 times daily Until 7/31       acetaminophen (TYLENOL) 325 MG tablet Take 650 mg by mouth every 6 hours as needed for pain        latanoprost (XALATAN) 0.005 % ophthalmic solution Place 1 drop into both eyes At Bedtime       timolol (TIMOPTIC) 0.5 % ophthalmic solution Place 1 drop into both eyes 2 times daily Hold if HR <60       nitroglycerin (NITROSTAT) 0.4 MG sublingual tablet Place 0.4 mg under the tongue every 5 minutes as needed for chest pain For chest pain place 1 tablet under the tongue every 5 minutes for 3 doses. If symptoms persist 5 minutes after 1st dose call 911.       loratadine (CLARITIN) 10 MG tablet Take 10 mg by mouth daily as needed for allergies       levETIRAcetam (KEPPRA) 500 MG tablet Take 500 mg by mouth 2 times daily       folic  acid (FOLVITE) 1 MG tablet Take 1 mg by mouth daily       DULoxetine (CYMBALTA) 60 MG EC capsule Take 60 mg by mouth 2 times daily       insulin aspart (NOVOLOG FLEXPEN) 100 UNIT/ML injection Inject 2 Units Subcutaneous 3 times daily (with meals)        insulin glargine (LANTUS) 100 UNIT/ML injection Inject 15 Units Subcutaneous At Bedtime        Warfarin Sodium (COUMADIN PO) Take by mouth daily Take as directed per INR results         ROS:  10 point ROS of systems including Constitutional, Eyes, Respiratory, Cardiovascular, Gastroenterology, Genitourinary, Integumentary, Muscularskeletal, Psychiatric were all negative except for pertinent positives noted in my HPI.    Exam:  /65  Pulse 61  Temp 97.9  F (36.6  C)  Resp 18  Wt 179 lb (81.2 kg)  BMI 29.79 kg/m2  GENERAL APPEARANCE:  Alert, in no distress  RESP:  respiratory effort and palpation of chest normal, no respiratory distress, lungs sounds clear  CV:  Palpation and auscultation of heart done , irregular rhythm, rate controled no murmur, rub, or gallop, edema none  ABDOMEN:  normal bowel sounds, soft, nontender,   SKIN:  Inspection and palpation of skin and subcutaneous tissue at baseline  PSYCH:  insight and judgement, memory appears appropriate, affect and mood normal    Lab/Diagnostic data:  7/28 hgb 8.5; na 135; k 4.7; bun 46, creat 1.71    ASSESSMENT/PLAN:  Physical deconditioning  Recent hospital stay for atrial fibrillation. She was found to be too weak to return home so she was admitted to the tcu for Physical Therapy and Ocupational Therapy with a goal to improve strength and mobility and be intdependent with ADL's    Chronic atrial fibrillation (H) / / Benign essential hypertension / / Chronic diastolic congestive heart failure (H)  She was admitted to the hospital with weakness and a fib with RVR. Her rate is controlled. Her medications were adjusted at the hospital. No signs of fluid overload. Echo 7/24/2017 showed severely dilated left  atrium and moderate LVH.    - Med changes at the hospital: cozaar stopped, pravastatin stopped, lasix stopped, Amiodorone started, and warfarin and metoprolol was continued.   - daily weights  - BMP pending today.   - monitor for need to add back lasix  - warfarin for stroke prevention home dose warfarin 5 mg qd    Hx of seizure disorder  Continues on keppra, denies recent seizure  - no change    Type 2 diabetes mellitus with complication, with long-term current use of insulin (H)  Home doseing: Lantus 30 units at HS, Novolog 2 units per carb TID with meals.   7/31 blood sugars elevated 300's. Currently on Lantus 15 units and novolog 2 units TID. Nursing home dose not do carb dosing.   - increase lantus to 20 units SQ at HS  - increase novolog to 4 units sq TID with meals  - accu checks QID    FLORA (obstructive sleep apnea)  She does not wear cpap, explained benefits of cpap and recommended CPAP.     CKD (chronic kidney disease) stage 3, GFR 30-59 ml/min  Baseline Creat 1.3 - meenakshi to 1.7 while at hospital then trended down.  - bmp pending today.    Anemia, unspecified type  Chronic. Last hgb 8.5  - add iron studies to labs from today     Abnormal LFTs    7/26 7/28   245   225  ALK ph [phone]  Total bil0.3   Trending down patient seen by GI at hospital.     Orders:  1. INR 1.4 Warfarin 5mg PO QD. Recheck INR on day 7  2. Increase Lantus to 20 units SQ QD  3. Novolog to 4 units SQ TID with meals  4. Accuchecks QID, Dx: DM2  5. Please Call and see if Iron studies can be added to to todays labs.   6. If not please order Iron Studies for tomorrow Dx Anemia  7. Omeprazole 20mg PO QD dx GECD      Electronically signed by:  Manda Molina NP  [facility]

## 2017-07-31 NOTE — PROGRESS NOTES
[place] [facility]  PRIMARY CARE PROVIDER AND CLINIC:  Krystle Sen [facility] [address] [facility] / [facility]*  No chief complaint on file.      HPI:    Diya Cruz is a 77 year old  (1940),admitted to the [facility]  from {LOCATION OF NURSING HOME ADMISSIONS:920796}.  Hospital stay *** through ***.  Admitted to this facility for  rehab, medical management and nursing care.  HPI information obtained from: facility chart records, facility staff, patient report and [facility] chart review.      Current issues are:      Data Unavailable    CODE STATUS/ADVANCE DIRECTIVES DISCUSSION:   {CODE STATUS:932333}  Patient's living condition: {LIVES WITH (NURSING HOME):015628}    ALLERGIES:Ambien [zolpidem]; Avandia [rosiglitazone]; Azithromycin; Clindamycin; Codeine; Hydrochlorothiazide w/triamterene; Lisinopril; Nitrofurantoin; Simvastatin; and Sulfa drugs  PAST MEDICAL HISTORY:  has a past medical history of Abdominal aortic stenosis; Anxiety; Basal cell carcinoma; Carotid stenosis; Claudication (H); Compression fracture; Depressive disorder; GI bleed; Glaucoma; Hyperlipidemia; Meniere syndrome; Obesity; Obstructive sleep apnea (adult) (pediatric); Peripheral vascular disease, unspecified (H); Pure hypercholesterolemia; Rheumatoid arthritis(714.0); Seizure disorder (H); Sjogren's disease (H); Type II or unspecified type diabetes mellitus without mention of complication, not stated as uncontrolled; Unspecified cerebral artery occlusion with cerebral infarction; Unspecified epilepsy without mention of intractable epilepsy; and Unspecified essential hypertension.  PAST SURGICAL HISTORY:  has a past surgical history that includes tonsillectomy; Hysterectomy; GYN surgery; Breast surgery; Head and neck surgery; Cardiac surgery; Cardiac surgery; Abdomen surgery; vascular surgery; Phacoemulsification with standard intraocular lens implant (6/26/2014); cataract iol, rt/lt  (2010); and Laser YAG capsulotomy (Bilateral, 8/27/2015).  FAMILY HISTORY: family history is not on file.  SOCIAL HISTORY:  reports that she has quit smoking. Her smoking use included Cigarettes. She has a 30.00 pack-year smoking history. She does not have any smokeless tobacco history on file. She reports that she does not drink alcohol or use illicit drugs.    Post Discharge Medication Reconciliation Status: {[facility] Med Rec (Provider):259840}.  Current Outpatient Prescriptions   Medication Sig Dispense Refill     cyclobenzaprine (FLEXERIL) 10 MG tablet Take 1 tablet (10 mg) by mouth 3 times daily as needed for muscle spasms 42 tablet 0     metoprolol (LOPRESSOR) 25 MG tablet Take 0.5 tablets (12.5 mg) by mouth 2 times daily 60 tablet 0     acetaminophen (TYLENOL) 500 MG tablet Take 1,000 mg by mouth 3 times daily       acetaminophen (TYLENOL) 325 MG tablet Take 325 mg by mouth every 6 hours as needed for pain       gabapentin (NEURONTIN) 300 MG capsule Take 300 mg by mouth 3 times daily       insulin aspart (NOVOLOG FLEXPEN) 100 UNIT/ML injection Inject Subcutaneous 3 times daily (before meals) Per sliding scale: 120 - 149 = 1 unit, 150 - 199 = 2 units, 200 - 249 = 4 units, 250 - 299 = 6 units, 300 - 349 = 8 units, 350+ = 10 units       insulin aspart (NOVOLOG FLEXPEN) 100 UNIT/ML injection Inject Subcutaneous At Bedtime Per sliding scale: 0 - 149 = 0 units, 150 - 199 = 1 unit, 200 - 249 = 2 units, 250 - 299 = 3 units, 300 - 349 = 4 units, 350+ = 5 units       latanoprost (XALATAN) 0.005 % ophthalmic solution Place 1 drop into both eyes At Bedtime       timolol (TIMOPTIC) 0.5 % ophthalmic solution Place 1 drop into both eyes 2 times daily Hold if HR <60       ranitidine (ZANTAC) 150 MG tablet Take 150 mg by mouth 2 times daily as needed for heartburn       pravastatin (PRAVACHOL) 40 MG tablet Take 40 mg by mouth daily       nitroglycerin (NITROSTAT) 0.4 MG sublingual tablet Place 0.4 mg under the tongue every 5  minutes as needed for chest pain For chest pain place 1 tablet under the tongue every 5 minutes for 3 doses. If symptoms persist 5 minutes after 1st dose call 911.       losartan (COZAAR) 50 MG tablet Take 50 mg by mouth daily       loperamide (IMODIUM) 2 MG capsule Take 2 mg by mouth every 4 hours as needed for diarrhea       loratadine (CLARITIN) 10 MG tablet Take 10 mg by mouth daily as needed for allergies       levETIRAcetam (KEPPRA) 500 MG tablet Take 500 mg by mouth 2 times daily       furosemide (LASIX) 40 MG tablet Take 40 mg by mouth daily       folic acid (FOLVITE) 1 MG tablet Take 1 mg by mouth daily       DULoxetine (CYMBALTA) 60 MG EC capsule Take 60 mg by mouth 2 times daily       insulin aspart (NOVOLOG FLEXPEN) 100 UNIT/ML injection Inject 4 Units Subcutaneous 3 times daily (with meals)        metoprolol (LOPRESSOR) 25 MG tablet Take 12.5 mg by mouth 2 times daily       insulin glargine (LANTUS) 100 UNIT/ML injection Inject 7 Units Subcutaneous At Bedtime        cyclobenzaprine (FLEXERIL) 10 MG tablet Take 10 mg by mouth 3 times daily as needed for muscle spasms       Warfarin Sodium (COUMADIN PO) Take 6 mg by mouth daily         ROS:  {ROS FGS:020181}    Exam:  There were no vitals taken for this visit.  {USP physical exam :689041}    Lab/Diagnostic data:   *** (no recent [place] labs and care everywhere is empty)      Last Basic Metabolic Panel:  No results for input(s): NA, POTASSIUM, CHLORIDE, DION, CO2, BUN, CR, GLC in the last 28218 hours.    Liver Function Studies - No results for input(s): PROTTOTAL, ALBUMIN, BILITOTAL, ALKPHOS, AST, ALT, BILIDIRECT in the last 53261 hours.    No results found for: TSH]    No results found for: A1C    ASSESSMENT/PLAN:  {FGS DX INITIAL:188928}    Orders:  1. INR 1.4 Warfarin 5mg PO QD. Recheck INR on day 7  2. Increase Lantus to 20 units SQ QD  3. Novolog to 4 units SQ TID with meals  4. Accuchecks QID, Dx: DM2  5. Please Call and see if Iron studies  can be added to to todays labs.   6. If not please order Iron Studies for tomorrow Dx Anemia  7. Omeprazole 20mg PO QD dx GECD    {FGS TIME SPENT:294656}    Electronically signed by:  Manda Molina NP  [facility]

## 2017-08-01 ENCOUNTER — TRANSFERRED RECORDS (OUTPATIENT)
Dept: HEALTH INFORMATION MANAGEMENT | Facility: CLINIC | Age: 77
End: 2017-08-01

## 2017-08-03 ENCOUNTER — TELEPHONE (OUTPATIENT)
Dept: GERIATRICS | Facility: CLINIC | Age: 77
End: 2017-08-03

## 2017-08-03 NOTE — TELEPHONE ENCOUNTER
Nursing calls to update NP on blood sugars. Home dose of lantus 30 units at HS. Current dose of lantus 25 units at HS  - increase lantus to 25 units at HS    Manda Mloina NP

## 2017-08-04 ENCOUNTER — TRANSFERRED RECORDS (OUTPATIENT)
Dept: HEALTH INFORMATION MANAGEMENT | Facility: CLINIC | Age: 77
End: 2017-08-04

## 2017-08-04 ENCOUNTER — TELEPHONE (OUTPATIENT)
Dept: GERIATRICS | Facility: CLINIC | Age: 77
End: 2017-08-04

## 2017-08-04 LAB
ALBUMIN SERPL-MCNC: 3.1 G/DL (ref 3.5–5)
ALBUMIN SERPL-MCNC: 3.1 G/DL (ref 3.5–5)
ALP SERPL-CCNC: 146 U/L (ref 45–120)
ALP SERPL-CCNC: 146 U/L (ref 45–120)
ALT SERPL-CCNC: 71 U/L (ref 0–45)
ALT SERPL-CCNC: 71 U/L (ref 0–45)
ANION GAP SERPL CALCULATED.3IONS-SCNC: 8 MMOL/L (ref 5–18)
ANION GAP SERPL CALCULATED.3IONS-SCNC: 8 MMOL/L (ref 5–18)
AST SERPL-CCNC: 24 U/L (ref 0–40)
AST SERPL-CCNC: 24 U/L (ref 0–40)
BILIRUB SERPL-MCNC: 0.4 MG/DL (ref 0–1)
BILIRUB SERPL-MCNC: 0.4 MG/DL (ref 0–1)
BILIRUBIN DIRECT: 0.2 MG/DL
BUN SERPL-MCNC: 30 MG/DL (ref 8–28)
BUN SERPL-MCNC: 30 MG/DL (ref 8–28)
CALCIUM SERPL-MCNC: 8.7 MG/DL (ref 8.5–10.5)
CALCIUM SERPL-MCNC: 8.7 MG/DL (ref 8.5–10.5)
CHLORIDE SERPLBLD-SCNC: 108 MMOL/L (ref 88–107)
CHLORIDE SERPLBLD-SCNC: 108 MMOL/L (ref 98–107)
CO2 SERPL-SCNC: 22 MMOL/L (ref 22–31)
CO2 SERPL-SCNC: 22 MMOL/L (ref 22–31)
CREAT SERPL-MCNC: 1.34 MG/DL (ref 0.6–1.1)
CREAT SERPL-MCNC: 1.34 MG/DL (ref 0.6–1.1)
GFR SERPL CREATININE-BSD FRML MDRD: 38 ML/MIN/1.73M2
GFR SERPL CREATININE-BSD FRML MDRD: 39 ML/MIN/1.73M2
GLUCOSE SERPL-MCNC: 233 MG/DL (ref 70–125)
GLUCOSE SERPL-MCNC: 233 MG/DL (ref 70–125)
POTASSIUM SERPL-SCNC: 4.2 MMOL/L (ref 3.5–5)
POTASSIUM SERPL-SCNC: 4.2 MMOL/L (ref 3.5–5)
PROT SERPL-MCNC: 6.1 G/DL (ref 6–8)
PROT SERPL-MCNC: 6.1 G/DL (ref 6–8)
SODIUM SERPL-SCNC: 138 MMOL/L (ref 136–145)
SODIUM SERPL-SCNC: 138 MMOL/L (ref 136–145)

## 2017-08-04 NOTE — TELEPHONE ENCOUNTER
TELEPHONE ENCOUNTER:      Diya Cruz is a 77 year old  (1940),Nurse called today to report: blood sugars are consistently over 300 and they have had to call the on call every night.    ASSESSMENT/PLAN  Uncontrolled DM    Start insulin SS Novolog    <200 0 units then 200 -249 = 2U, 250-299 = 4 U, 300-349 = 6 U, 350-399= 8U; 400-449 = 10 UNITS; AND >/= 450 GIVE 12 UNITS.  TID WITH MEALS ALONG WITH THE SCHEDULED NOVOLOG    MABEL Coelho CNP

## 2017-08-06 ENCOUNTER — TRANSFERRED RECORDS (OUTPATIENT)
Dept: HEALTH INFORMATION MANAGEMENT | Facility: CLINIC | Age: 77
End: 2017-08-06

## 2017-08-06 LAB
ANION GAP SERPL CALCULATED.3IONS-SCNC: 10 MMOL/L (ref 5–18)
ANION GAP SERPL CALCULATED.3IONS-SCNC: 10 MMOL/L (ref 5–18)
BUN SERPL-MCNC: 25 MG/DL (ref 8–28)
BUN SERPL-MCNC: 25 MG/DL (ref 8–28)
CALCIUM SERPL-MCNC: 8.8 MG/DL (ref 8.5–10.5)
CALCIUM SERPL-MCNC: 8.8 MG/DL (ref 8.5–10.5)
CHLORIDE SERPLBLD-SCNC: 106 MMOL/L (ref 98–107)
CHLORIDE SERPLBLD-SCNC: 106 MMOL/L (ref 98–107)
CO2 SERPL-SCNC: 21 MMOL/L (ref 22–31)
CO2 SERPL-SCNC: 21 MMOL/L (ref 22–31)
CREAT SERPL-MCNC: 1.37 MG/DL (ref 0.6–1.1)
CREAT SERPL-MCNC: 1.37 MG/DL (ref 0.6–1.1)
GFR SERPL CREATININE-BSD FRML MDRD: 38 ML/MIN/1.73M2
GFR SERPL CREATININE-BSD FRML MDRD: 38 ML/MIN/1.73M2
GLUCOSE SERPL-MCNC: 193 MG/DL (ref 70–125)
GLUCOSE SERPL-MCNC: 193 MG/DL (ref 70–125)
POTASSIUM SERPL-SCNC: 4.4 MMOL/L (ref 3.5–5)
POTASSIUM SERPL-SCNC: 4.4 MMOL/L (ref 3.5–5)
SODIUM SERPL-SCNC: 137 MMOL/L (ref 136–145)
SODIUM SERPL-SCNC: 137 MMOL/L (ref 136–145)

## 2017-08-07 ENCOUNTER — TELEPHONE (OUTPATIENT)
Dept: GERIATRICS | Facility: CLINIC | Age: 77
End: 2017-08-07

## 2017-08-07 NOTE — TELEPHONE ENCOUNTER
TELEPHONE ENCOUNTER:      Diya Cruz is a 77 year old  (1940),Nurse called today to report: INR today 2.1 down from 2.9.  Has been on 2.5 mg to 5 mg Coumadin.    ASSESSMENT/PLAN  Therapeutic INR    Coumadin 5 mg Mon & Fri then 2.5 mg all other days    Recheck INR 8/14    MABEL Coelho CNP

## 2017-08-08 ENCOUNTER — NURSING HOME VISIT (OUTPATIENT)
Dept: GERIATRICS | Facility: CLINIC | Age: 77
End: 2017-08-08
Payer: MEDICARE

## 2017-08-08 VITALS
SYSTOLIC BLOOD PRESSURE: 145 MMHG | HEART RATE: 62 BPM | WEIGHT: 188 LBS | DIASTOLIC BLOOD PRESSURE: 67 MMHG | OXYGEN SATURATION: 97 % | RESPIRATION RATE: 18 BRPM | BODY MASS INDEX: 31.28 KG/M2 | TEMPERATURE: 96.2 F

## 2017-08-08 DIAGNOSIS — I10 BENIGN ESSENTIAL HYPERTENSION: ICD-10-CM

## 2017-08-08 DIAGNOSIS — R94.5 NONSPECIFIC ABNORMAL RESULTS OF LIVER FUNCTION STUDY: ICD-10-CM

## 2017-08-08 DIAGNOSIS — D64.9 ANEMIA, UNSPECIFIED TYPE: ICD-10-CM

## 2017-08-08 DIAGNOSIS — Z79.4 TYPE 2 DIABETES MELLITUS WITH COMPLICATION, WITH LONG-TERM CURRENT USE OF INSULIN (H): ICD-10-CM

## 2017-08-08 DIAGNOSIS — I50.32 CHRONIC DIASTOLIC CONGESTIVE HEART FAILURE (H): ICD-10-CM

## 2017-08-08 DIAGNOSIS — E11.8 TYPE 2 DIABETES MELLITUS WITH COMPLICATION, WITH LONG-TERM CURRENT USE OF INSULIN (H): ICD-10-CM

## 2017-08-08 DIAGNOSIS — N18.30 CKD (CHRONIC KIDNEY DISEASE) STAGE 3, GFR 30-59 ML/MIN (H): ICD-10-CM

## 2017-08-08 DIAGNOSIS — I48.20 CHRONIC ATRIAL FIBRILLATION (H): Primary | ICD-10-CM

## 2017-08-08 PROCEDURE — 99310 SBSQ NF CARE HIGH MDM 45: CPT | Performed by: NURSE PRACTITIONER

## 2017-08-08 RX ORDER — FERROUS SULFATE 325(65) MG
325 TABLET ORAL
COMMUNITY
End: 2017-12-06

## 2017-08-08 NOTE — PROGRESS NOTES
[place] [facility]  PRIMARY CARE PROVIDER AND CLINIC:  Krystle Sen [facility] [address] [facility] / [facility]*  Chief Complaint   Patient presents with     RECHECK       HPI:    Diya Cruz is a 77 year old  (1940),admitted to the [facility]  from [facility].  Hospital stay 7/23 through 7/28.  Admitted to this facility for  rehab, medical management and nursing care.  HPI information obtained from: facility chart records, facility staff, patient report and [facility] chart review.      Current issues are:         Chronic atrial fibrillation (H)  CKD (chronic kidney disease) stage 3, GFR 30-59 ml/min  Anemia, unspecified type  Nonspecific abnormal results of liver function study  Benign essential hypertension  Chronic diastolic congestive heart failure (H)  Type 2 diabetes mellitus with complication, with long-term current use of insulin (H)    See assessment / plan for HPI     ALLERGIES:Ambien [zolpidem]; Avandia [rosiglitazone]; Azithromycin; Clindamycin; Codeine; Hydrochlorothiazide w/triamterene; Lisinopril; Nitrofurantoin; Simvastatin; and Sulfa drugs  PAST MEDICAL HISTORY:  has a past medical history of Abdominal aortic stenosis; Anxiety; Basal cell carcinoma; Carotid stenosis; Cerebrovascular accident (CVA) (H) (8/19/2013); Claudication (H); Compression fracture; Depressive disorder; GI bleed; Glaucoma; Hyperlipidemia; Meniere syndrome; Obesity; Obstructive sleep apnea (adult) (pediatric); Peripheral vascular disease, unspecified (H); Pure hypercholesterolemia; Rheumatoid arthritis(714.0); Seizure disorder (H); Sjogren's disease (H); Type II or unspecified type diabetes mellitus without mention of complication, not stated as uncontrolled; Unspecified cerebral artery occlusion with cerebral infarction; Unspecified epilepsy without mention of intractable epilepsy; and Unspecified essential hypertension.  PAST SURGICAL  HISTORY:  has a past surgical history that includes tonsillectomy; Hysterectomy; GYN surgery; Breast surgery; Head and neck surgery; Cardiac surgery; Cardiac surgery; Abdomen surgery; vascular surgery; Phacoemulsification with standard intraocular lens implant (6/26/2014); cataract iol, rt/lt (2010); and Laser YAG capsulotomy (Bilateral, 8/27/2015).  FAMILY HISTORY: family history is not on file.  SOCIAL HISTORY:  reports that she has quit smoking. Her smoking use included Cigarettes. She has a 30.00 pack-year smoking history. She does not have any smokeless tobacco history on file. She reports that she does not drink alcohol or use illicit drugs.      Current Outpatient Prescriptions   Medication Sig Dispense Refill     insulin aspart (NOVOLOG FLEXPEN) 100 UNIT/ML injection Inject Subcutaneous 3 times daily (with meals) Sliding scale Novolog, 0-200=0 units, 201-249= 2U, 250-299= 4U, 300-349= 6U, 350-399= 8U, 400-449= 10U, >450= 12U       ferrous sulfate (IRON) 325 (65 FE) MG tablet Take 325 mg by mouth daily (with breakfast)       OMEPRAZOLE PO Take 20 mg by mouth daily as needed       METOPROLOL TARTRATE PO Take 50 mg by mouth 2 times daily       GABAPENTIN PO Take 200 mg by mouth 3 times daily       AMIODARONE HCL PO Take 200 mg by mouth 2 times daily        acetaminophen (TYLENOL) 325 MG tablet Take 650 mg by mouth every 6 hours as needed for pain        latanoprost (XALATAN) 0.005 % ophthalmic solution Place 1 drop into both eyes At Bedtime       timolol (TIMOPTIC) 0.5 % ophthalmic solution Place 1 drop into both eyes 2 times daily Hold if HR <60       nitroglycerin (NITROSTAT) 0.4 MG sublingual tablet Place 0.4 mg under the tongue every 5 minutes as needed for chest pain For chest pain place 1 tablet under the tongue every 5 minutes for 3 doses. If symptoms persist 5 minutes after 1st dose call 911.       loratadine (CLARITIN) 10 MG tablet Take 10 mg by mouth daily as needed for allergies       levETIRAcetam  (KEPPRA) 500 MG tablet Take 500 mg by mouth 2 times daily       DULoxetine (CYMBALTA) 60 MG EC capsule Take 60 mg by mouth 2 times daily       insulin aspart (NOVOLOG FLEXPEN) 100 UNIT/ML injection Inject 4 Units Subcutaneous 3 times daily (with meals)        insulin glargine (LANTUS) 100 UNIT/ML injection Inject 25 Units Subcutaneous At Bedtime        Warfarin Sodium (COUMADIN PO) Take by mouth daily Take as directed per INR results       Furosemide (LASIX PO) Take 40 mg by mouth daily         ROS:  10 point ROS of systems including Constitutional, Eyes, Respiratory, Cardiovascular, Gastroenterology, Genitourinary, Integumentary, Muscularskeletal, Psychiatric were all negative except for pertinent positives noted in my HPI.    Exam:  /67  Pulse 62  Temp 96.2  F (35.7  C)  Resp 18  Wt 188 lb (85.3 kg)  SpO2 97%  BMI 31.28 kg/m2  GENERAL APPEARANCE:  Alert, in no distress  RESP:  respiratory effort and palpation of chest normal, no respiratory distress, lungs sounds clear  CV:  Palpation and auscultation of heart done , irregular rhythm, rate controled no murmur, rub, or gallop, edema +1 pitting LE edema  ABDOMEN:  normal bowel sounds, soft, nontender,   SKIN:  Inspection and palpation of skin and subcutaneous tissue at baseline  PSYCH:  insight and judgement, memory appears appropriate, affect and mood normal  SLUMS 13/30 ACL 4.0/5.8    Lab/Diagnostic data:  7/28 hgb 8.5; na 135; k 4.7; bun 46, creat 1.71  Last Basic Metabolic Panel:  Lab Results   Component Value Date     08/04/2017      Lab Results   Component Value Date    POTASSIUM 4.2 08/04/2017     Lab Results   Component Value Date    CHLORIDE 108 08/04/2017     Lab Results   Component Value Date    DION 8.7 08/04/2017     Lab Results   Component Value Date    CO2 22 08/04/2017     Lab Results   Component Value Date    BUN 30 08/04/2017     Lab Results   Component Value Date    CR 1.34 08/04/2017     Lab Results   Component Value Date    GLC  233 08/04/2017         ASSESSMENT/PLAN:  Chronic atrial fibrillation (H) / / Benign essential hypertension / / Chronic diastolic congestive heart failure (H)  She was admitted to the hospital with weakness and a fib with RVR. Her rate is controlled. Her medications were adjusted at the hospital. No signs of fluid overload. Echo 7/24/2017 showed severely dilated left atrium and moderate LVH.    - Med changes at the hospital: cozaar stopped, pravastatin stopped, lasix stopped, imdure stopped. Amiodorone started, and warfarin and metoprolol were continued.   - daily weights  - Lasix restarted at 40 mg once daily late last week.  - warfarin for stroke prevention (home dose warfarin 5 mg qd)    Type 2 diabetes mellitus with complication, with long-term current use of insulin (H)  Home doseing: Lantus 30 units at HS, Novolog 2 units per carb TID with meals.   7/31 blood sugars elevated 300's. Currently on Lantus 15 units and novolog 2 units TID. Nursing home dose not do carb dosing.   - increase lantus to 20 units SQ at HS  - increase novolog to 4 units sq TID with meals  - accu checks QID  8/8 good control with lantus at 25 units SQ at HS and novolog 4 units with meals and sliding scale  - no changes    CKD (chronic kidney disease) stage 3, GFR 30-59 ml/min  Baseline Creat 1.3 - meenakshi to 1.7 while at hospital then trended down.   Creatinine   Date Value Ref Range Status   08/04/2017 1.34 (A) 0.60 - 1.10 mg/dL Final   back to baseline.     Anemia, unspecified type  Chronic. Last hgb 8.5. Now on iron replacement  Check hgb in 1 week.     Abnormal LFTs    7/26 7/28 8/4   245 71   225 24   ALK ph [phone]  Returning to normal  - consider restarting pravastatin    Orders:  1. Lasix 40mg PO QD D CHF    Electronically signed by:  Manda Molina NP  [facility]

## 2017-08-09 ENCOUNTER — NURSING HOME VISIT (OUTPATIENT)
Dept: FAMILY MEDICINE | Facility: OTHER | Age: 77
End: 2017-08-09
Payer: MEDICARE

## 2017-08-09 ENCOUNTER — TRANSFERRED RECORDS (OUTPATIENT)
Dept: HEALTH INFORMATION MANAGEMENT | Facility: CLINIC | Age: 77
End: 2017-08-09

## 2017-08-09 VITALS
WEIGHT: 188 LBS | SYSTOLIC BLOOD PRESSURE: 145 MMHG | RESPIRATION RATE: 16 BRPM | HEART RATE: 62 BPM | DIASTOLIC BLOOD PRESSURE: 67 MMHG | OXYGEN SATURATION: 97 % | TEMPERATURE: 98 F | BODY MASS INDEX: 31.28 KG/M2

## 2017-08-09 DIAGNOSIS — N18.30 CKD (CHRONIC KIDNEY DISEASE) STAGE 3, GFR 30-59 ML/MIN (H): ICD-10-CM

## 2017-08-09 DIAGNOSIS — I49.5 SICK SINUS SYNDROME (H): ICD-10-CM

## 2017-08-09 DIAGNOSIS — R53.81 PHYSICAL DECONDITIONING: ICD-10-CM

## 2017-08-09 DIAGNOSIS — I48.20 CHRONIC ATRIAL FIBRILLATION (H): ICD-10-CM

## 2017-08-09 DIAGNOSIS — R79.89 ELEVATED LFTS: ICD-10-CM

## 2017-08-09 DIAGNOSIS — I10 BENIGN ESSENTIAL HYPERTENSION: ICD-10-CM

## 2017-08-09 DIAGNOSIS — I50.32 CHRONIC DIASTOLIC CONGESTIVE HEART FAILURE (H): ICD-10-CM

## 2017-08-09 DIAGNOSIS — E11.65 TYPE 2 DIABETES MELLITUS WITH HYPERGLYCEMIA, UNSPECIFIED LONG TERM INSULIN USE STATUS: ICD-10-CM

## 2017-08-09 PROBLEM — G47.33 OSA (OBSTRUCTIVE SLEEP APNEA): Status: RESOLVED | Noted: 2017-07-31 | Resolved: 2017-08-09

## 2017-08-09 PROBLEM — R94.5 NONSPECIFIC ABNORMAL RESULTS OF LIVER FUNCTION STUDY: Status: RESOLVED | Noted: 2017-07-31 | Resolved: 2017-08-09

## 2017-08-09 PROBLEM — Z86.69 HX OF SEIZURE DISORDER: Status: RESOLVED | Noted: 2017-07-31 | Resolved: 2017-08-09

## 2017-08-09 LAB
ANION GAP SERPL CALCULATED.3IONS-SCNC: 8 MMOL/L (ref 5–18)
BUN SERPL-MCNC: 24 MG/DL (ref 8–28)
CALCIUM SERPL-MCNC: 8.6 MG/DL (ref 8.5–10.5)
CHLORIDE SERPLBLD-SCNC: 109 MMOL/L (ref 98–107)
CO2 SERPL-SCNC: 23 MMOL/L (ref 22–31)
CREAT SERPL-MCNC: 1.22 MG/DL (ref 0.6–1.1)
GFR SERPL CREATININE-BSD FRML MDRD: 43 ML/MIN/1.73M2
GLUCOSE SERPL-MCNC: 73 MG/DL (ref 70–125)
POTASSIUM SERPL-SCNC: 3.7 MMOL/L (ref 3.5–5)
SODIUM SERPL-SCNC: 140 MMOL/L (ref 136–145)

## 2017-08-09 PROCEDURE — 99304 1ST NF CARE SF/LOW MDM 25: CPT | Performed by: FAMILY MEDICINE

## 2017-08-09 PROCEDURE — 99207 ZZC CDG-DOWN CODE ROS EXAM: CPT | Performed by: FAMILY MEDICINE

## 2017-08-09 NOTE — ASSESSMENT & PLAN NOTE
Continues with Physical Therapy, was walking today and now is fatigued.  Have noticed cognitive dysfunction, SLUMS 13/30 and ACL 4.4/5.8 which recommends living somewhere with daily checks.  Patient does not want to go to assisted living, wants to return to her apartment.  She is working with Speech Therapy at this time.

## 2017-08-09 NOTE — ASSESSMENT & PLAN NOTE
Thought to be related to poor perfusion during afib with RVR, they are continuing to improve and almost back to normal, will continue to monitor.

## 2017-08-09 NOTE — ASSESSMENT & PLAN NOTE
BP increasing, slowly adding back her previous medications.  Lasix and Cozaar at at half of her previous dose.  Remains off Imdur at this time.

## 2017-08-09 NOTE — ASSESSMENT & PLAN NOTE
Her Imdur, Cozaar, Lasix and pravastatin were all stopped in the hospital.  Her Lasix was restarted at half of the previous dose, her BPs are ranging 120-150s/50-70s, her kidney function is back to baseline, so will restart Cozaar at half its previous dose 25 mg (was on 50 mg).

## 2017-08-09 NOTE — MR AVS SNAPSHOT
"              After Visit Summary   8/9/2017    Diya Cruz    MRN: [id number]           Patient Information     Date Of Birth          1940        Visit Information        Provider Department      8/9/2017 10:43 AM Aixa Esparza MD [facility]        Today's Diagnoses     Chronic atrial fibrillation (H)        Chronic diastolic congestive heart failure (H)        Elevated LFTs        CKD (chronic kidney disease) stage 3, GFR 30-59 ml/min        Type 2 diabetes mellitus with hyperglycemia, unspecified long term insulin use status (H)        Sick sinus syndrome (H)        Benign essential hypertension        Physical deconditioning           Follow-ups after your visit        Who to contact     If you have questions or need follow up information about today's clinic visit or your schedule please contact [facility] directly at [phone].  Normal or non-critical lab and imaging results will be communicated to you by MyChart, letter or phone within 4 business days after the clinic has received the results. If you do not hear from us within 7 days, please contact the clinic through MyChart or phone. If you have a critical or abnormal lab result, we will notify you by phone as soon as possible.  Submit refill requests through in[employer] or call your pharmacy and they will forward the refill request to us. Please allow 3 business days for your refill to be completed.          Additional Information About Your Visit        MyChart Information     [employer] lets you send messages to your doctor, view your test results, renew your prescriptions, schedule appointments and more. To sign up, go to [email or web address] . Click on \"Log in\" on the left side of the screen, which will take you to the Welcome page. Then click on \"Sign up Now\" on the right side of the page.     You will be asked to enter the access code listed below, as well as some personal information. Please " follow the directions to create your username and password.     Your access code is: [email or web address]  Expires: 2017 12:58 PM     Your access code will  in 90 days. If you need help or a new code, please call your [place] clinic or [phone].        Care EveryWhere ID     This is your Care EveryWhere ID. This could be used by other organizations to access your [place] medical records  [id number]        Your Vitals Were     Pulse Temperature Respirations Pulse Oximetry BMI (Body Mass Index)       62 98  F (36.7  C) 16 97% 31.28 kg/m2        Blood Pressure from Last 3 Encounters:   17 145/67   17 145/67   17 135/65    Weight from Last 3 Encounters:   17 188 lb (85.3 kg)   17 188 lb (85.3 kg)   17 179 lb (81.2 kg)              Today, you had the following     No orders found for display       Primary Care Provider Office Phone # Fax #    Krystle Mckinley [phone]       [facility] [address] [facility] [address]        Equal Access to Services     RITU FELICIANO AH: Hadii kirill garnicao Somarjan, waaxda luqadaha, qaybta kaalmada adeegyada, jonathon lazo. So [facility] [phone].    ATENCIÓN: Si habla español, tiene a saxena disposición servicios gratuitos de asistencia lingüística. LlThe [facility] [phone].    We comply with applicable federal civil rights laws and [place] laws. We do not discriminate on the basis of race, color, national origin, age, disability sex, sexual orientation or gender identity.            Thank you!     Thank you for choosing [facility]  for your care. Our goal is always to provide you with excellent care. Hearing back from our patients is one way we can continue to improve our services. Please take a few minutes to complete the written survey that you may receive in the mail after your visit with us. Thank you!             Your Updated Medication List - Protect others around  you: Learn how to safely use, store and throw away your medicines at www.disposemymeds.org.          This list is accurate as of: 8/9/17 12:58 PM.  Always use your most recent med list.                   Brand Name Dispense Instructions for use Diagnosis    acetaminophen 325 MG tablet    TYLENOL     Take 650 mg by mouth every 6 hours as needed for pain        AMIODARONE HCL PO      Take 200 mg by mouth 2 times daily        COUMADIN PO      Take by mouth daily Take as directed per INR results        CYMBALTA 60 MG EC capsule   Generic drug:  DULoxetine      Take 60 mg by mouth 2 times daily        ferrous sulfate 325 (65 FE) MG tablet    IRON     Take 325 mg by mouth daily (with breakfast)        GABAPENTIN PO      Take 200 mg by mouth 3 times daily        insulin glargine 100 UNIT/ML injection    LANTUS     Inject 25 Units Subcutaneous At Bedtime        KEPPRA 500 MG tablet   Generic drug:  levETIRAcetam      Take 500 mg by mouth 2 times daily        LASIX PO      Take 40 mg by mouth daily        latanoprost 0.005 % ophthalmic solution    XALATAN     Place 1 drop into both eyes At Bedtime        loratadine 10 MG tablet    CLARITIN     Take 10 mg by mouth daily as needed for allergies        METOPROLOL TARTRATE PO      Take 50 mg by mouth 2 times daily        NITROSTAT 0.4 MG sublingual tablet   Generic drug:  nitroGLYcerin      Place 0.4 mg under the tongue every 5 minutes as needed for chest pain For chest pain place 1 tablet under the tongue every 5 minutes for 3 doses. If symptoms persist 5 minutes after 1st dose call 911.        * NovoLOG FLEXPEN 100 UNIT/ML injection   Generic drug:  insulin aspart      Inject 4 Units Subcutaneous 3 times daily (with meals)        * NovoLOG FLEXPEN 100 UNIT/ML injection   Generic drug:  insulin aspart      Inject Subcutaneous 3 times daily (with meals) Sliding scale Novolog, 0-200=0 units, 201-249= 2U, 250-299= 4U, 300-349= 6U, 350-399= 8U, 400-449= 10U, >450= 12U         OMEPRAZOLE PO      Take 20 mg by mouth daily as needed        timolol 0.5 % ophthalmic solution    TIMOPTIC     Place 1 drop into both eyes 2 times daily Hold if HR <60        * Notice:  This list has 2 medication(s) that are the same as other medications prescribed for you. Read the directions carefully, and ask your doctor or other care provider to review them with you.

## 2017-08-09 NOTE — PROGRESS NOTES
HISTORY OF PRESENT ILLNESS:  Diya is a 77 year old female, (1940), admitted to [facility] from [facility].  Hospital stay 7/23 through 7/28 for atrial fibrillation with rapid ventricular response resulting in acute kidney injury and elevated LFTs thought to be secondary to poor perfusion.  Admitted to this facility for  rehab, medical management and nursing care.     Past Medical History/  Patient Active Problem List    Diagnosis Date Noted     Elevated LFTs 07/31/2017     Priority: Medium     Physical deconditioning 07/31/2017     Priority: Medium     Chronic atrial fibrillation (H) 07/31/2017     Priority: Medium     Benign essential hypertension 07/31/2017     Priority: Medium     Chronic diastolic congestive heart failure (H) 07/31/2017     Priority: Medium     CKD (chronic kidney disease) stage 3, GFR 30-59 ml/min 07/31/2017     Priority: Medium     Anemia, unspecified type 07/31/2017     Priority: Medium     Sick sinus syndrome (H) 01/13/2017     Priority: Medium     Nondiabetic proliferative retinopathy 08/08/2016     Priority: Medium     Chronic back pain 03/29/2016     Priority: Medium     Multiple-type hyperlipidemia 01/26/2016     Priority: Medium     Mass of breast 07/23/2015     Priority: Medium     Fatigue 07/22/2015     Priority: Medium     Abnormal computed tomography scan 07/10/2015     Priority: Medium     Overview:   Lung nodules. Following with Dr. Pennington, due for repeat CT 1/2016       Stenosis of carotid artery 06/12/2015     Priority: Medium     Candidal intertrigo 04/10/2015     Priority: Medium     Microalbuminuria 01/26/2015     Priority: Medium     Mild episode of recurrent major depressive disorder (H) 01/26/2015     Priority: Medium     Type 2 diabetes mellitus (H) 01/26/2015     Priority: Medium     Spinal stenosis of lumbar region 01/23/2015     Priority: Medium     History of adenomatous polyp of colon 11/22/2013     Priority: Medium      History of endovascular stent graft for abdominal aortic aneurysm 11/22/2013     Priority: Medium     Anxiety 10/22/2013     Priority: Medium     Seasonal allergic rhinitis 09/03/2013     Priority: Medium     Seizure disorder (H) 09/03/2013     Priority: Medium     Overview:   Possible reaction to Remicade, takes Keppra       Glaucoma 08/19/2013     Priority: Medium     Hypoferremia 08/19/2013     Priority: Medium     Overview:   Source in distal small bowel per patient (identified on pill cam). History transfusions.       Obstructive sleep apnea syndrome 08/19/2013     Priority: Medium     Overview:   Unable to tolerate CPAP       Peripheral arterial occlusive disease (H) 08/19/2013     Priority: Medium     Overview:   8/8/01 - 18-mm infrarenal aortic stent with angioplasty   8/23/01, Angioplasty Rt prox SFA, thrombolysis Rt CFA and SFA.  7/14/11 - bilateral GLENN stents       Rheumatoid arthritis (H) 08/19/2013     Priority: Medium     Overview:   Dr. Trevino            Social History     Social History     Marital status:      Spouse name: N/A     Number of children: N/A     Years of education: N/A     Occupational History     Not on file.     Social History Main Topics     Smoking status: Former Smoker     Packs/day: 1.00     Years: 30.00     Types: Cigarettes     Smokeless tobacco: Not on file     Alcohol use No     Drug use: No     Sexual activity: No     Other Topics Concern     Not on file     Social History Narrative        Current Outpatient Prescriptions   Medication Sig     Furosemide (LASIX PO) Take 40 mg by mouth daily     insulin aspart (NOVOLOG FLEXPEN) 100 UNIT/ML injection Inject Subcutaneous 3 times daily (with meals) Sliding scale Novolog, 0-200=0 units, 201-249= 2U, 250-299= 4U, 300-349= 6U, 350-399= 8U, 400-449= 10U, >450= 12U     ferrous sulfate (IRON) 325 (65 FE) MG tablet Take 325 mg by mouth daily (with breakfast)     OMEPRAZOLE PO Take 20 mg by mouth daily as needed     METOPROLOL  TARTRATE PO Take 50 mg by mouth 2 times daily     GABAPENTIN PO Take 200 mg by mouth 3 times daily     AMIODARONE HCL PO Take 200 mg by mouth 2 times daily      acetaminophen (TYLENOL) 325 MG tablet Take 650 mg by mouth every 6 hours as needed for pain      latanoprost (XALATAN) 0.005 % ophthalmic solution Place 1 drop into both eyes At Bedtime     timolol (TIMOPTIC) 0.5 % ophthalmic solution Place 1 drop into both eyes 2 times daily Hold if HR <60     nitroglycerin (NITROSTAT) 0.4 MG sublingual tablet Place 0.4 mg under the tongue every 5 minutes as needed for chest pain For chest pain place 1 tablet under the tongue every 5 minutes for 3 doses. If symptoms persist 5 minutes after 1st dose call 911.     loratadine (CLARITIN) 10 MG tablet Take 10 mg by mouth daily as needed for allergies     levETIRAcetam (KEPPRA) 500 MG tablet Take 500 mg by mouth 2 times daily     DULoxetine (CYMBALTA) 60 MG EC capsule Take 60 mg by mouth 2 times daily     insulin aspart (NOVOLOG FLEXPEN) 100 UNIT/ML injection Inject 4 Units Subcutaneous 3 times daily (with meals)      insulin glargine (LANTUS) 100 UNIT/ML injection Inject 25 Units Subcutaneous At Bedtime      Warfarin Sodium (COUMADIN PO) Take by mouth daily Take as directed per INR results     No current facility-administered medications for this visit.        Allergies   Allergen Reactions     Ambien [Zolpidem] Other (See Comments)     Avandia [Rosiglitazone] Hives     Azithromycin Diarrhea     Clindamycin Diarrhea     Codeine Nausea and Vomiting     Hydrochlorothiazide W/Triamterene Swelling     Lisinopril Cough     Nitrofurantoin Diarrhea     Simvastatin Diarrhea     Sulfa Drugs      Lightheaded, GI upset       I have reviewed the care plan and do agree with the plan.    Information reviewed:  Medications, vital signs, orders, and nursing notes.    ROS:  No chest pain, shortness of breath, palpitations, fevers, chills, headache, nausea, vomiting, dysuria or bowel  abnormalities.  Appetite is  fair.  She is frustrated as she feels she is ready to return to independent living at her apartment and her son is wanting her to go to assisted living.    OBJECTIVE:  /67  Pulse 62  Temp 98  F (36.7  C)  Resp 16  Wt 188 lb (85.3 kg)  SpO2 97%  BMI 31.28 kg/m2  GENERAL APPEARANCE:  Alert, in no distress  RESP:  respiratory effort normal, no respiratory distress, lungs sounds clear  CV:   irregular rhythm, rate controlled, no murmur, no edema   ABDOMEN:  normal bowel sounds, soft, nontender, no masses  PSYCH:  insight and judgement, memory appears appropriate, affect and mood normal    Results for orders placed or performed in visit on 08/04/17   Basic metabolic panel   Result Value Ref Range    Sodium 138 136 - 145 mmol/L    Potassium 4.2 3.5 - 5.0 mmol/L    Chloride 108 (A) 88 - 107 mmol/L    Carbon Dioxide 22 22 - 31 mmol/L    Anion Gap 8 5 - 18 mmol/L    Glucose 233 (A) 70 - 125 mg/dL    Urea Nitrogen 30 (A) 8 - 28 mg/dL    Creatinine 1.34 (A) 0.60 - 1.10 mg/dL    Calcium 8.7 8.5 - 10.5 mg/dL    GFR Estimate 38 (L) >60 mL/min/1.73m2    GFR Estimate If Black 47 (L) >60 mL/min/1.73m2    Narrative    LAB RESULTS, [facility]   Hepatic panel   Result Value Ref Range    Protein Total 6.1 6.0 - 8.0 g/dL    Albumin 3.1 (A) 3.5 - 5.0 g/dL    Bilirubin Total 0.4 0.0 - 1.0 mg/dL    Alkaline Phosphatase 148 (A) 45 - 120 U/L    AST 24 0 - 40 U/L    ALT 71 (A) 0 - 45 U/L    Bilirubin Direct 0.2 <=0.5 mg/dL    Narrative    LAB RESULTS, [facility]        ASSESSMENT/PLAN:    Chronic atrial fibrillation (H)  Rate is controlled, she is on amiodarone, metoprolol and warfarin.    Chronic diastolic congestive heart failure (H)  Her Imdur, Cozaar, Lasix and pravastatin were all stopped in the hospital.  Her Lasix was restarted at half of the previous dose, her BPs are ranging 120-150s/50-70s, her kidney function is back to baseline, so will restart Cozaar at half its previous dose 25 mg (was  on 50 mg).    Elevated LFTs  Thought to be related to poor perfusion during afib with RVR, they are continuing to improve and almost back to normal, will continue to monitor.    CKD (chronic kidney disease) stage 3, GFR 30-59 ml/min  Creatinine back to baseline.  Will restart Cozaar.    Type 2 diabetes mellitus (H)  Blood sugars 90-190s, remains on insulin.    Sick sinus syndrome (H)  Now has pacemaker.    Benign essential hypertension  BP increasing, slowly adding back her previous medications.  Lasix and Cozaar at at half of her previous dose.  Remains off Imdur at this time.    Physical deconditioning  Continues with Physical Therapy, was walking today and now is fatigued.  Have noticed cognitive dysfunction, SLUMS 13/30 and ACL 4.4/5.8 which recommends living somewhere with daily checks.  Patient does not want to go to assisted living, wants to return to her apartment.  She is working with Speech Therapy at this time.       Aixa Esparza MD

## 2017-08-14 ENCOUNTER — NURSING HOME VISIT (OUTPATIENT)
Dept: GERIATRICS | Facility: CLINIC | Age: 77
End: 2017-08-14
Payer: MEDICARE

## 2017-08-14 VITALS
DIASTOLIC BLOOD PRESSURE: 70 MMHG | SYSTOLIC BLOOD PRESSURE: 154 MMHG | RESPIRATION RATE: 16 BRPM | HEART RATE: 60 BPM | TEMPERATURE: 97 F

## 2017-08-14 DIAGNOSIS — Z79.01 LONG TERM (CURRENT) USE OF ANTICOAGULANTS: ICD-10-CM

## 2017-08-14 DIAGNOSIS — I48.20 CHRONIC ATRIAL FIBRILLATION (H): Primary | ICD-10-CM

## 2017-08-14 DIAGNOSIS — Z51.81 ENCOUNTER FOR THERAPEUTIC DRUG MONITORING: ICD-10-CM

## 2017-08-14 PROCEDURE — 99308 SBSQ NF CARE LOW MDM 20: CPT | Performed by: NURSE PRACTITIONER

## 2017-08-14 NOTE — PROGRESS NOTES
[facility]    HPI:    Diya Cruz is a 77 year old  (1940), who is being seen today for an episodic care visit at [facility].   HPI information obtained from: facility chart records and patient report. Today's concern is INR/Coumadin management for JHON. Fib    Bleeding Signs/Symptoms:  None  Thromboembolic Signs/Symptoms:  None    Medication Changes:  No  Dietary Changes:  No  Activity Changes: No  Bacterial/Viral Infection:  No    Missed Coumadin Doses:  None    On ASA: No    Other Concerns:  No    OBJECTIVE:    INR Today:  1.9  Current Dose:  5mg Mon, Fri. 2.5mg Rest of days    ASSESSMENT:     Chronic atrial fibrillation (H)  Encounter for therapeutic drug monitoring  Long term (current) use of anticoagulants     Subtherapeutic INR for goal of 2-3    PLAN:    New Dose: 5mg PO Mon, Wed, Fri. 2.5mg Tues, Thur, Sat, Sun      Next INR: 1 week    Manda Molina NP

## 2017-08-15 ENCOUNTER — RECORDS - HEALTHEAST (OUTPATIENT)
Dept: LAB | Facility: CLINIC | Age: 77
End: 2017-08-15

## 2017-08-16 ENCOUNTER — TRANSFERRED RECORDS (OUTPATIENT)
Dept: HEALTH INFORMATION MANAGEMENT | Facility: CLINIC | Age: 77
End: 2017-08-16

## 2017-08-16 ENCOUNTER — NURSING HOME VISIT (OUTPATIENT)
Dept: GERIATRICS | Facility: CLINIC | Age: 77
End: 2017-08-16
Payer: MEDICARE

## 2017-08-16 VITALS
DIASTOLIC BLOOD PRESSURE: 69 MMHG | TEMPERATURE: 97.6 F | RESPIRATION RATE: 16 BRPM | OXYGEN SATURATION: 95 % | HEART RATE: 62 BPM | WEIGHT: 185 LBS | BODY MASS INDEX: 30.79 KG/M2 | SYSTOLIC BLOOD PRESSURE: 150 MMHG

## 2017-08-16 DIAGNOSIS — Z79.4 TYPE 2 DIABETES MELLITUS WITH COMPLICATION, WITH LONG-TERM CURRENT USE OF INSULIN (H): ICD-10-CM

## 2017-08-16 DIAGNOSIS — I10 BENIGN ESSENTIAL HYPERTENSION: ICD-10-CM

## 2017-08-16 DIAGNOSIS — I48.20 CHRONIC ATRIAL FIBRILLATION (H): Primary | ICD-10-CM

## 2017-08-16 DIAGNOSIS — E11.8 TYPE 2 DIABETES MELLITUS WITH COMPLICATION, WITH LONG-TERM CURRENT USE OF INSULIN (H): ICD-10-CM

## 2017-08-16 DIAGNOSIS — D64.9 ANEMIA, UNSPECIFIED TYPE: ICD-10-CM

## 2017-08-16 DIAGNOSIS — I50.32 CHRONIC DIASTOLIC CONGESTIVE HEART FAILURE (H): ICD-10-CM

## 2017-08-16 DIAGNOSIS — N18.30 CKD (CHRONIC KIDNEY DISEASE) STAGE 3, GFR 30-59 ML/MIN (H): ICD-10-CM

## 2017-08-16 LAB
CREAT SERPL-MCNC: 1.07 MG/DL (ref 0.6–1.1)
CREAT SERPL-MCNC: 1.07 MG/DL (ref 0.6–1.1)
GFR SERPL CREATININE-BSD FRML MDRD: 50 ML/MIN/1.73M2
GFR SERPL CREATININE-BSD FRML MDRD: 50 ML/MIN/1.73M2
HEMOGLOBIN: 9.2 G/DL (ref 12–16)
POTASSIUM SERPL-SCNC: 3.4 MMOL/L (ref 3.5–5)
SODIUM SERPL-SCNC: 144 MMOL/L (ref 136–145)

## 2017-08-16 PROCEDURE — 99309 SBSQ NF CARE MODERATE MDM 30: CPT | Performed by: NURSE PRACTITIONER

## 2017-08-16 NOTE — PROGRESS NOTES
[facility]  PRIMARY CARE PROVIDER AND CLINIC:  [facility] [address] / [facility]*  Chief Complaint   Patient presents with     Nursing Home Acute     TCU f/u       HPI:    Diya Cruz is a 77 year old  (1940),admitted to the [facility]  from [facility].  Hospital stay 7/23 through 7/28.  Admitted to this facility for  rehab, medical management and nursing care.  HPI information obtained from: facility chart records, facility staff, patient report and [facility] chart review.      Current issues are:         Chronic atrial fibrillation (H)  Type 2 diabetes mellitus with complication, with long-term current use of insulin (H)  CKD (chronic kidney disease) stage 3, GFR 30-59 ml/min  Anemia, unspecified type  Benign essential hypertension  Chronic diastolic congestive heart failure (H)    See assessment / plan for HPI     ALLERGIES:Ambien [zolpidem]; Avandia [rosiglitazone]; Azithromycin; Clindamycin; Codeine; Hydrochlorothiazide w/triamterene; Lisinopril; Nitrofurantoin; Simvastatin; and Sulfa drugs  PAST MEDICAL HISTORY:  has a past medical history of Abdominal aortic stenosis; Anxiety; Basal cell carcinoma; Carotid stenosis; Cerebrovascular accident (CVA) (H) (8/19/2013); Claudication (H); Compression fracture; Depressive disorder; GI bleed; Glaucoma; Hyperlipidemia; Meniere syndrome; Obesity; Obstructive sleep apnea (adult) (pediatric); Peripheral vascular disease, unspecified (H); Pure hypercholesterolemia; Rheumatoid arthritis(714.0); Seizure disorder (H); Sjogren's disease (H); Type II or unspecified type diabetes mellitus without mention of complication, not stated as uncontrolled; Unspecified cerebral artery occlusion with cerebral infarction; Unspecified epilepsy without mention of intractable epilepsy; and Unspecified essential hypertension.  PAST SURGICAL HISTORY:  has a past surgical history  that includes tonsillectomy; Hysterectomy; GYN surgery; Breast surgery; Head and neck surgery; Cardiac surgery; Cardiac surgery; Abdomen surgery; vascular surgery; Phacoemulsification with standard intraocular lens implant (6/26/2014); cataract iol, rt/lt (2010); and Laser YAG capsulotomy (Bilateral, 8/27/2015).  FAMILY HISTORY: family history is not on file.  SOCIAL HISTORY:  reports that she has quit smoking. Her smoking use included Cigarettes. She has a 30.00 pack-year smoking history. She does not have any smokeless tobacco history on file. She reports that she does not drink alcohol or use illicit drugs.      Current Outpatient Prescriptions   Medication Sig Dispense Refill     Losartan Potassium (COZAAR PO) Take 25 mg by mouth daily       MELATONIN PO Take 3 mg by mouth nightly as needed       FOLIC ACID PO Take 1 mg by mouth daily       Furosemide (LASIX PO) Take 40 mg by mouth daily       insulin aspart (NOVOLOG FLEXPEN) 100 UNIT/ML injection Inject Subcutaneous 3 times daily (with meals) Sliding scale Novolog, 0-200=0 units, 201-249= 2U, 250-299= 4U, 300-349= 6U, 350-399= 8U, 400-449= 10U, >450= 12U       ferrous sulfate (IRON) 325 (65 FE) MG tablet Take 325 mg by mouth daily (with breakfast)       OMEPRAZOLE PO Take 20 mg by mouth daily as needed       METOPROLOL TARTRATE PO Take 50 mg by mouth 2 times daily       GABAPENTIN PO Take 200 mg by mouth 3 times daily       AMIODARONE HCL PO Take 200 mg by mouth 2 times daily        acetaminophen (TYLENOL) 325 MG tablet Take 650 mg by mouth every 4 hours as needed for pain        latanoprost (XALATAN) 0.005 % ophthalmic solution Place 1 drop into both eyes At Bedtime       timolol (TIMOPTIC) 0.5 % ophthalmic solution Place 1 drop into both eyes 2 times daily Hold if HR <60       nitroglycerin (NITROSTAT) 0.4 MG sublingual tablet Place 0.4 mg under the tongue every 5 minutes as needed for chest pain For chest pain place 1 tablet under the tongue every 5 minutes  for 3 doses. If symptoms persist 5 minutes after 1st dose call 911.       loratadine (CLARITIN) 10 MG tablet Take 10 mg by mouth daily as needed for allergies       levETIRAcetam (KEPPRA) 500 MG tablet Take 500 mg by mouth 2 times daily       DULoxetine (CYMBALTA) 60 MG EC capsule Take 60 mg by mouth 2 times daily       insulin aspart (NOVOLOG FLEXPEN) 100 UNIT/ML injection Inject 4 Units Subcutaneous 3 times daily (with meals)        insulin glargine (LANTUS) 100 UNIT/ML injection Inject 25 Units Subcutaneous At Bedtime        Warfarin Sodium (COUMADIN PO) Take by mouth daily Take as directed per INR results         ROS:  10 point ROS of systems including Constitutional, Eyes, Respiratory, Cardiovascular, Gastroenterology, Genitourinary, Integumentary, Muscularskeletal, Psychiatric were all negative except for pertinent positives noted in my HPI.    Exam:  /69  Pulse 62  Temp 97.6  F (36.4  C)  Resp 16  Wt 185 lb (83.9 kg)  SpO2 95%  BMI 30.79 kg/m2  GENERAL APPEARANCE:  Alert, in no distress  RESP:  respiratory effort and palpation of chest normal, no respiratory distress, lungs sounds clear  CV:  Palpation and auscultation of heart done , irregular rhythm, rate controled no murmur, rub, or gallop, edema +1 pitting LE edema  ABDOMEN:  normal bowel sounds, soft, nontender,   SKIN:  Inspection and palpation of skin and subcutaneous tissue at baseline  PSYCH:  insight and judgement, memory appears appropriate, affect and mood normal  SLUMS 13/30 ACL 4.0/5.8    Lab/Diagnostic data:  7/28 hgb 8.5; na 135; k 4.7; bun 46, creat 1.71  Last Basic Metabolic Panel:  Lab Results   Component Value Date     08/04/2017      Lab Results   Component Value Date    POTASSIUM 4.2 08/04/2017     Lab Results   Component Value Date    CHLORIDE 108 08/04/2017     Lab Results   Component Value Date    DION 8.7 08/04/2017     Lab Results   Component Value Date    CO2 22 08/04/2017     Lab Results   Component Value Date     BUN 30 08/04/2017     Lab Results   Component Value Date    CR 1.34 08/04/2017     Lab Results   Component Value Date     08/04/2017         ASSESSMENT/PLAN:  Chronic atrial fibrillation (H) / / Benign essential hypertension / / Chronic diastolic congestive heart failure (H)  She was admitted to the hospital with weakness and a fib with RVR. Her rate is controlled. Her medications were adjusted at the hospital. No signs of fluid overload. Echo 7/24/2017 showed severely dilated left atrium and moderate LVH.    - Med changes at the hospital: cozaar stopped, pravastatin stopped, lasix stopped, imdure stopped. Amiodorone started, and warfarin and metoprolol were continued.   - daily weights  - Lasix restarted at 40 mg once daily 8/4  - warfarin for stroke prevention (home dose warfarin 5 mg qd)  - losartan added at 25 mg on 8/9  - b/ps elevated increase losartan to 50 mg       Type 2 diabetes mellitus with complication, with long-term current use of insulin (H)  Home doseing: Lantus 30 units at HS, Novolog 2 units per carb TID with meals.   7/31 blood sugars elevated 300's. Currently on Lantus 15 units and novolog 2 units TID. Nursing home dose not do carb dosing.   - increase lantus to 20 units SQ at HS  - increase novolog to 4 units sq TID with meals  - accu checks QID  8/8 good control with lantus at 25 units SQ at HS and novolog 4 units with meals and sliding scale  - no changes  8/18 blood sugars well controlled, no lows    CKD (chronic kidney disease) stage 3, GFR 30-59 ml/min  Baseline Creat 1.3 - meenakshi to 1.7 while at hospital then trended down.   Creatinine   Date Value Ref Range Status   08/16/2017 1.07 0.60 - 1.10 mg/dL Final   back to baseline.   Keep kidney funciton in mind with illness and medication changes.     Anemia, unspecified type  hgb improving  Hemoglobin   Date Value Ref Range Status   08/16/2017 9.2 (L) 12.0 - 16.0 g/dL Final   ]   - continue iron      Orders:  1. No changes at this  time    Electronically signed by:  Manda Molina NP  [facility]

## 2017-08-21 ENCOUNTER — DISCHARGE SUMMARY NURSING HOME (OUTPATIENT)
Dept: GERIATRICS | Facility: CLINIC | Age: 77
End: 2017-08-21
Payer: MEDICARE

## 2017-08-21 VITALS
DIASTOLIC BLOOD PRESSURE: 68 MMHG | HEART RATE: 61 BPM | BODY MASS INDEX: 30.22 KG/M2 | RESPIRATION RATE: 20 BRPM | WEIGHT: 181.6 LBS | TEMPERATURE: 97 F | SYSTOLIC BLOOD PRESSURE: 125 MMHG

## 2017-08-21 DIAGNOSIS — N18.30 CKD (CHRONIC KIDNEY DISEASE) STAGE 3, GFR 30-59 ML/MIN (H): ICD-10-CM

## 2017-08-21 DIAGNOSIS — I10 BENIGN ESSENTIAL HYPERTENSION: ICD-10-CM

## 2017-08-21 DIAGNOSIS — G47.33 OSA (OBSTRUCTIVE SLEEP APNEA): ICD-10-CM

## 2017-08-21 DIAGNOSIS — G40.909 SEIZURE DISORDER (H): ICD-10-CM

## 2017-08-21 DIAGNOSIS — D64.9 ANEMIA, UNSPECIFIED TYPE: ICD-10-CM

## 2017-08-21 DIAGNOSIS — E11.8 TYPE 2 DIABETES MELLITUS WITH COMPLICATION, WITH LONG-TERM CURRENT USE OF INSULIN (H): ICD-10-CM

## 2017-08-21 DIAGNOSIS — I48.20 CHRONIC ATRIAL FIBRILLATION (H): ICD-10-CM

## 2017-08-21 DIAGNOSIS — R53.81 PHYSICAL DECONDITIONING: Primary | ICD-10-CM

## 2017-08-21 DIAGNOSIS — Z79.4 TYPE 2 DIABETES MELLITUS WITH COMPLICATION, WITH LONG-TERM CURRENT USE OF INSULIN (H): ICD-10-CM

## 2017-08-21 PROCEDURE — 99316 NF DSCHRG MGMT 30 MIN+: CPT | Performed by: NURSE PRACTITIONER

## 2017-08-21 RX ORDER — LOSARTAN POTASSIUM 50 MG/1
50 TABLET ORAL DAILY
Qty: 30 TABLET | Refills: 0 | Status: SHIPPED | OUTPATIENT
Start: 2017-08-21 | End: 2017-12-06

## 2017-08-21 RX ORDER — FUROSEMIDE 40 MG
40 TABLET ORAL DAILY
Qty: 30 TABLET | Refills: 0 | Status: SHIPPED | OUTPATIENT
Start: 2017-08-21 | End: 2018-03-06

## 2017-08-21 RX ORDER — AMIODARONE HYDROCHLORIDE 200 MG/1
200 TABLET ORAL DAILY
Qty: 30 TABLET | Refills: 0 | Status: SHIPPED | OUTPATIENT
Start: 2017-08-21

## 2017-08-21 RX ORDER — METOPROLOL TARTRATE 50 MG
50 TABLET ORAL 2 TIMES DAILY
Qty: 60 TABLET | Refills: 0 | Status: SHIPPED | OUTPATIENT
Start: 2017-08-21 | End: 2018-04-24

## 2017-08-21 RX ORDER — WARFARIN SODIUM 5 MG/1
5 TABLET ORAL DAILY
Qty: 30 TABLET | Refills: 0 | Status: SHIPPED | OUTPATIENT
Start: 2017-08-21 | End: 2017-12-21 | Stop reason: DRUGHIGH

## 2017-08-21 NOTE — PROGRESS NOTES
[facility] DISCHARGE SUMMARY    PATIENT'S NAME: Diya Cruz  YOB: 1940  MEDICAL RECORD NUMBER:  [id number]    PRIMARY CARE PROVIDER AND CLINIC RESPONSIBLE AFTER TRANSFER: [facility] [address] / [place]*    CODE STATUS/ADVANCE DIRECTIVES DISCUSSION:   DNR only       Allergies   Allergen Reactions     Ambien [Zolpidem] Other (See Comments)     Avandia [Rosiglitazone] Hives     Azithromycin Diarrhea     Clindamycin Diarrhea     Codeine Nausea and Vomiting     Hydrochlorothiazide W/Triamterene Swelling     Lisinopril Cough     Nitrofurantoin Diarrhea     Simvastatin Diarrhea     Sulfa Drugs      Lightheaded, GI upset       TRANSFERRING PROVIDERS: Manda Molina NP, Aixa Esparza MD  DATE OF SNF ADMISSION:  July / 28 / 2017  DATE OF SNF (anticipated) DISCHARGE: August / 23 / 2017  DISCHARGE DISPOSITION: Non-FMG Provider   Nursing Facility: [facility] stay 7/23 to 7/28.     Condition on Discharge:  Stable.  Function:  Ambulates with walker  Cognitive Scores: ACL 4.0/5.8    Equipment: walker    DISCHARGE DIAGNOSIS:   1. Physical deconditioning    2. Chronic atrial fibrillation (H)    3. Seizure disorder (H)    4. Benign essential hypertension    5. Type 2 diabetes mellitus with complication, with long-term current use of insulin (H)    6. FLORA (obstructive sleep apnea)    7. CKD (chronic kidney disease) stage 3, GFR 30-59 ml/min    8. Anemia, unspecified type        HPI Nursing Facility Course:  HPI information obtained from: facility chart records and patient report.    Physical deconditioning  Improved with therapy   - home Physical Therapy / Ocupational Therapy     Chronic atrial fibrillation (H)  Meds adjusted at hosptial. amiodorone added.  - continues on warfarin with goal iNR of 2-3  - amiodarone (PACERONE/CODARONE) 200 MG tablet; Take 1 tablet (200 mg) by mouth daily  -  warfarin (COUMADIN) 5 MG tablet; Take 1 tablet (5 mg) by mouth daily Take as directed per INR results    Seizure disorder (H)  No changes to keppra    Benign essential hypertension  Initially had hypotension and meds were on hold. Restarted lasix and losartan at the nursing home. Metoprolol dose continued.   - losartan (COZAAR) 50 MG tablet; Take 1 tablet (50 mg) by mouth daily  - furosemide (LASIX) 40 MG tablet; Take 1 tablet (40 mg) by mouth daily  - metoprolol (LOPRESSOR) 50 MG tablet; Take 1 tablet (50 mg) by mouth 2 times daily    Type 2 diabetes mellitus with complication, with long-term current use of insulin (H)  Insulin regimen adjusted and blood sugars have been well controlled. Recommend she continue with current dosing and follow up with PCP  - lantus 25 units Sq once daily  - Novolog 4 units three times daily     FLORA (obstructive sleep apnea)  Recommend sleep study and cpap    CKD (chronic kidney disease) stage 3, GFR 30-59 ml/min  Back to baseline   Creatinine   Date Value Ref Range Status   08/16/2017 1.07 0.60 - 1.10 mg/dL Final   ]     Anemia, unspecified type  Started iron for iron def.   Hemoglobin   Date Value Ref Range Status   08/16/2017 9.2 (L) 12.0 - 16.0 g/dL Final   ]       PAST MEDICAL HISTORY:  has a past medical history of Abdominal aortic stenosis; Anxiety; Basal cell carcinoma; Carotid stenosis; Cerebrovascular accident (CVA) (H) (8/19/2013); Claudication (H); Compression fracture; Depressive disorder; GI bleed; Glaucoma; Hyperlipidemia; Meniere syndrome; Obesity; Obstructive sleep apnea (adult) (pediatric); Peripheral vascular disease, unspecified (H); Pure hypercholesterolemia; Rheumatoid arthritis(714.0); Seizure disorder (H); Sjogren's disease (H); Type II or unspecified type diabetes mellitus without mention of complication, not stated as uncontrolled; Unspecified cerebral artery occlusion with cerebral infarction; Unspecified epilepsy without mention of intractable epilepsy; and  Unspecified essential hypertension.    DISCHARGE MEDICATIONS:  Current Outpatient Prescriptions   Medication Sig Dispense Refill     losartan (COZAAR) 50 MG tablet Take 1 tablet (50 mg) by mouth daily 30 tablet 0     furosemide (LASIX) 40 MG tablet Take 1 tablet (40 mg) by mouth daily 30 tablet 0     amiodarone (PACERONE/CODARONE) 200 MG tablet Take 1 tablet (200 mg) by mouth daily 30 tablet 0     metoprolol (LOPRESSOR) 50 MG tablet Take 1 tablet (50 mg) by mouth 2 times daily 60 tablet 0     warfarin (COUMADIN) 5 MG tablet Take 1 tablet (5 mg) by mouth daily Take as directed per INR results 30 tablet 0     MELATONIN PO Take 3 mg by mouth nightly as needed       FOLIC ACID PO Take 1 mg by mouth daily       insulin aspart (NOVOLOG FLEXPEN) 100 UNIT/ML injection Inject Subcutaneous 3 times daily (with meals) Sliding scale Novolog, 0-200=0 units, 201-249= 2U, 250-299= 4U, 300-349= 6U, 350-399= 8U, 400-449= 10U, >450= 12U       ferrous sulfate (IRON) 325 (65 FE) MG tablet Take 325 mg by mouth daily (with breakfast)       OMEPRAZOLE PO Take 20 mg by mouth daily as needed       GABAPENTIN PO Take 200 mg by mouth 3 times daily       acetaminophen (TYLENOL) 325 MG tablet Take 650 mg by mouth every 4 hours as needed for pain        latanoprost (XALATAN) 0.005 % ophthalmic solution Place 1 drop into both eyes At Bedtime       timolol (TIMOPTIC) 0.5 % ophthalmic solution Place 1 drop into both eyes 2 times daily Hold if HR <60       nitroglycerin (NITROSTAT) 0.4 MG sublingual tablet Place 0.4 mg under the tongue every 5 minutes as needed for chest pain For chest pain place 1 tablet under the tongue every 5 minutes for 3 doses. If symptoms persist 5 minutes after 1st dose call 911.       loratadine (CLARITIN) 10 MG tablet Take 10 mg by mouth daily as needed for allergies       levETIRAcetam (KEPPRA) 500 MG tablet Take 500 mg by mouth 2 times daily       DULoxetine (CYMBALTA) 60 MG EC capsule Take 60 mg by mouth 2 times daily        insulin aspart (NOVOLOG FLEXPEN) 100 UNIT/ML injection Inject 4 Units Subcutaneous 3 times daily (with meals)        insulin glargine (LANTUS) 100 UNIT/ML injection Inject 25 Units Subcutaneous At Bedtime        [DISCONTINUED] Losartan Potassium (COZAAR PO) Take 25 mg by mouth daily       [DISCONTINUED] Furosemide (LASIX PO) Take 40 mg by mouth daily       [DISCONTINUED] METOPROLOL TARTRATE PO Take 50 mg by mouth 2 times daily       [DISCONTINUED] AMIODARONE HCL PO Take 200 mg by mouth 2 times daily        [DISCONTINUED] Warfarin Sodium (COUMADIN PO) Take by mouth daily Take as directed per INR results         MEDICATION CHANGES/RATIONALE:   7/29 INR 2.4 Give 2.5 mg warfarin today and recheck tomroow  7/30 warfarin 3 mg today  7/31 INR 1.9 warfarin 5 mg po everyday  Increase lantus to 20 units SQ QD  Increase novolog to 4 units s1 TID with meals  Omeprazole 20 mg po qd  8/1 ferrous sulfate 325 mg tab po qd for anemia  8/2 inr 2.7 warfarin 2.5 mg Po wed, sat, 5 mg po thurs, fri sun  8/3 increase lantus to 25 units sq qd  INR 2.9 warfarin 2.5 mg po everyday recheck INR on Monday  8/3 low scale sliding scale novolog added  8/4 lasix 40 mg po today and tomorrow  8/5 lasix 20 mg po on 8/6  Potassium 20 meq x 1   8/7 INR 2.1 warfarin 5 mg po mon, Friday and 2.5 mg tuesd, wed, thurs, sat, sun  8/8 lasix 40 mg PO everyday  8/9 cozaar 25 mg po qd  Melatonin 3 mg po everyday  8/14 inr 1.9 warfarin 5 mg MWF, 5 mg T, TH, Sa, Su 8/16 increas losartan to 50 mg po everyday  8/21 INR 1.6 warfarin 5 mg po everyday  Discontinue sliding scale insulin    Controlled medications sent with patient: not applicable/none     ROS:    4 point ROS including Respiratory, CV, GI and , other than that noted in the HPI,  is negative    Physical Exam:   Vitals: /77  Pulse 61  Temp 97  F (36.1  C)  Resp 20  Wt 181 lb 9.6 oz (82.4 kg)  BMI 30.22 kg/m2  BMI= Body mass index is 30.22 kg/(m^2).    GENERAL APPEARANCE:  Alert, in no  distress    DISCHARGE PLAN:  Occupational Therapy, Physical Therapy, Registered Nurse and Home Health Aide  Patient instructed to follow-up with:  PCP in 7 days      Current [place] scheduled appointments:  No future appointments.    MTM referral needed and placed by this provider: No    Pending labs: none  SNF labs:  8/1 ferritin 34 iron 13   8/4 sodium 138 potassium 4.2 bn 30 creat 1.34 bilirubin 0.4 bilirubin direct 0.2 protein total 6.1  Albumin 3.1 alk phos 146 AST 24 ALT 71  8/6 potassium 4.4 bun 25 creat 1.37 gfr 38  8/9 potassium 3.7 creat 1.22 bun 24  8/16 hgb 9.2 potassium 3.4 sodium 144 creat 1.07    Discharge Treatments:n/a    TOTAL DISCHARGE TIME:   Greater than 30 minutes  Electronically signed by:  Manda Molina NP

## 2017-08-24 ENCOUNTER — TELEPHONE (OUTPATIENT)
Dept: FAMILY MEDICINE | Facility: OTHER | Age: 77
End: 2017-08-24

## 2017-08-24 NOTE — TELEPHONE ENCOUNTER
ED / Discharge Outreach Protocol    Patient Contact    Attempt # 1    Was call answered?  No.  Left message on voicemail with information to call me back.    Jeanette Schaefer, RN, BSN

## 2017-08-24 NOTE — TELEPHONE ENCOUNTER
Reason for follow up call: Diya Cruz appeared on our list for being seen in and recenlty discharge from the Hospital.    Chief Complaint   Patient presents with     Hospital F/U     Physical Deconditioning       Encounter routed for Clinic RN to call for follow up      Klaudia Clarke RN

## 2017-08-25 NOTE — TELEPHONE ENCOUNTER
"Hospital/TCU/ED for chronic condition Discharge Protocol    \"Hi, my name is Jeanette Schaefer, a registered nurse, and I am calling from [facility].  I am calling to follow up and see how things are going for you after your recent emergency visit/hospital/TCU stay.\"    Tell me how you are doing now that you are home?\" \"Fine. Gaining my strength.\"      Discharge Instructions    \"Let's review your discharge instructions.  What is/are the follow-up recommendations?  Pt. Response: did this yesterday with [facility]    \"Has an appointment with your primary care provider been scheduled?\"   not needed    \"When you see the provider, I would recommend that you bring your medications with you.\"    Medications    \"Tell me what changed about your medicines when you discharged?\"    Changes to chronic meds?    0-1    \"What questions do you have about your medications?\"    None     New diagnoses of heart failure, COPD, diabetes, or MI?    No     On insulin: \"Did you start on insulin in the hospital or did you have your insulin dose changed?\"  No     On warfarin: \"Were you given any recommendations for follow-up with the anticoagulation clinic?\" Yes - Anticoagulation clinic appointment is already scheduled at appropriate interval with her home care nurse     Medication reconciliation completed? Yes  Was MTM referral placed (*Make sure to put transitions as reason for referral)?   No    Call Summary    \"What questions or concerns do you have about your recent visit and your follow-up care?\"     none    \"If you have questions or things don't continue to improve, we encourage you contact us through the main clinic number (give number).  Even if the clinic is not open, triage nurses are available 24/7 to help you.     We would like you to know that our clinic has extended hours (provide information).  We also have urgent care (provide details on closest location and hours/contact info)\"      \"Thank you for your time and take " "care!\"    Jeanette Schaefer RN, BSN            "

## 2017-12-06 ENCOUNTER — NURSING HOME VISIT (OUTPATIENT)
Dept: GERIATRICS | Facility: CLINIC | Age: 77
End: 2017-12-06
Payer: MEDICARE

## 2017-12-06 VITALS
HEART RATE: 60 BPM | OXYGEN SATURATION: 94 % | SYSTOLIC BLOOD PRESSURE: 148 MMHG | RESPIRATION RATE: 20 BRPM | DIASTOLIC BLOOD PRESSURE: 62 MMHG | TEMPERATURE: 98.5 F

## 2017-12-06 DIAGNOSIS — Z86.69 HX OF SEIZURE DISORDER: ICD-10-CM

## 2017-12-06 DIAGNOSIS — S42.412D CLOSED SUPRACONDYLAR FRACTURE OF LEFT HUMERUS WITH ROUTINE HEALING, SUBSEQUENT ENCOUNTER: Primary | ICD-10-CM

## 2017-12-06 DIAGNOSIS — E11.65 TYPE 2 DIABETES MELLITUS WITH HYPERGLYCEMIA, WITH LONG-TERM CURRENT USE OF INSULIN (H): ICD-10-CM

## 2017-12-06 DIAGNOSIS — R41.0 DELIRIUM: ICD-10-CM

## 2017-12-06 DIAGNOSIS — Z79.4 TYPE 2 DIABETES MELLITUS WITH HYPERGLYCEMIA, WITH LONG-TERM CURRENT USE OF INSULIN (H): ICD-10-CM

## 2017-12-06 DIAGNOSIS — I48.20 CHRONIC ATRIAL FIBRILLATION (H): ICD-10-CM

## 2017-12-06 DIAGNOSIS — R41.89 COGNITIVE IMPAIRMENT: ICD-10-CM

## 2017-12-06 PROCEDURE — 99310 SBSQ NF CARE HIGH MDM 45: CPT | Performed by: NURSE PRACTITIONER

## 2017-12-06 RX ORDER — AMOXICILLIN 250 MG
1 CAPSULE ORAL 2 TIMES DAILY PRN
COMMUNITY

## 2017-12-06 NOTE — PROGRESS NOTES
[facility]  PRIMARY CARE PROVIDER AND CLINIC:  [facility] [address] / [place]*  Chief Complaint   Patient presents with     Hospital F/U       HPI:    Diya Cruz is a 77 year old  (1940),admitted to the [facility]  from [facility].  Hospital stay 11/28/17 through 12/4/17.  Admitted to this facility for  rehab, medical management and nursing care.  HPI information obtained from: facility chart records, facility staff, patient report, [facility] chart review and Care Everywhere [facility] chart review.     [facility] SUMMARY: fracture after a fall admitted to [facility]. Significant complications of delirium and acute kidney insuff     Current issues are:         Closed supracondylar fracture of left humerus with routine healing, subsequent encounter  Type 2 diabetes mellitus with hyperglycemia, with long-term current use of insulin (H)  Delirium  Hx of seizure disorder  Chronic atrial fibrillation (H)  Cognitive impairment    See assessment / plan for HPI     CODE STATUS/ADVANCE DIRECTIVES DISCUSSION:  DNR / DNI  Patient's living condition: lives alone    ALLERGIES:Ambien [zolpidem]; Azithromycin; Clindamycin; Codeine; Lisinopril; Nitrofurantoin; Simvastatin; and Sulfa drugs  PAST MEDICAL HISTORY:  has a past medical history of Abdominal aortic stenosis; Anxiety; Basal cell carcinoma; Carotid stenosis; Cerebrovascular accident (CVA) (H) (8/19/2013); Claudication (H); Compression fracture; Depressive disorder; GI bleed; Glaucoma; Hyperlipidemia; Meniere syndrome; Obesity; Obstructive sleep apnea (adult) (pediatric); Peripheral vascular disease, unspecified (H); Pure hypercholesterolemia; Rheumatoid arthritis(714.0); Seizure disorder (H); Sjogren's disease (H); Type II or unspecified type diabetes mellitus without mention of complication, not stated as uncontrolled; Unspecified cerebral artery  occlusion with cerebral infarction; Unspecified epilepsy without mention of intractable epilepsy; and Unspecified essential hypertension.  PAST SURGICAL HISTORY:  has a past surgical history that includes tonsillectomy; Hysterectomy; GYN surgery; Breast surgery; Head and neck surgery; Cardiac surgery; Cardiac surgery; Abdomen surgery; vascular surgery; Phacoemulsification with standard intraocular lens implant (6/26/2014); cataract iol, rt/lt (2010); and Laser YAG capsulotomy (Bilateral, 8/27/2015).  FAMILY HISTORY: family history is not on file.  SOCIAL HISTORY:  reports that she has quit smoking. Her smoking use included Cigarettes. She has a 30.00 pack-year smoking history. She does not have any smokeless tobacco history on file. She reports that she does not drink alcohol or use illicit drugs.    Post Discharge Medication Reconciliation Status: unable to reconcile discharge medications due to patient unable, dtr not avail at this time.  Current Outpatient Prescriptions   Medication Sig Dispense Refill     senna-docusate (SENOKOT-S;PERICOLACE) 8.6-50 MG per tablet Take 1 tablet by mouth 2 times daily       TRAMADOL HCL PO Take 50 mg by mouth every 6 hours as needed for moderate to severe pain       PRAVASTATIN SODIUM PO Take 40 mg by mouth every evening       furosemide (LASIX) 40 MG tablet Take 1 tablet (40 mg) by mouth daily 30 tablet 0     amiodarone (PACERONE/CODARONE) 200 MG tablet Take 1 tablet (200 mg) by mouth daily 30 tablet 0     metoprolol (LOPRESSOR) 50 MG tablet Take 1 tablet (50 mg) by mouth 2 times daily 60 tablet 0     warfarin (COUMADIN) 5 MG tablet Take 1 tablet (5 mg) by mouth daily Take as directed per INR results 30 tablet 0     MELATONIN PO Take 1 mg by mouth nightly as needed        FOLIC ACID PO Take 1 mg by mouth daily       OMEPRAZOLE PO Take 20 mg by mouth daily as needed       GABAPENTIN PO Take 300 mg by mouth 3 times daily        acetaminophen (TYLENOL) 325 MG tablet Take 650 mg by  mouth every 6 hours as needed for pain        latanoprost (XALATAN) 0.005 % ophthalmic solution Place 1 drop into both eyes At Bedtime       timolol (TIMOPTIC) 0.5 % ophthalmic solution Place 1 drop into both eyes 2 times daily Hold if HR <60       nitroglycerin (NITROSTAT) 0.4 MG sublingual tablet Place 0.4 mg under the tongue every 5 minutes as needed for chest pain For chest pain place 1 tablet under the tongue every 5 minutes for 3 doses. If symptoms persist 5 minutes after 1st dose call 911.       loratadine (CLARITIN) 10 MG tablet Take 10 mg by mouth daily as needed for allergies       levETIRAcetam (KEPPRA) 500 MG tablet Take 500 mg by mouth 2 times daily       DULoxetine (CYMBALTA) 60 MG EC capsule Take 60 mg by mouth 2 times daily       insulin aspart (NOVOLOG FLEXPEN) 100 UNIT/ML injection Inject 2 Units Subcutaneous 3 times daily (with meals)        insulin glargine (LANTUS) 100 UNIT/ML injection Inject 37 Units Subcutaneous At Bedtime          ROS:  Unobtainable secondary to cognitive impairment. Denies pain but grimaces when she moves her arm.     Exam:  /62  Pulse 60  Temp 98.5  F (36.9  C)  Resp 20  SpO2 94%  GENERAL APPEARANCE:  Sleepy, unable to stay awake during conversation, in no distress  RESP:  respiratory effort and palpation of chest normal, no respiratory distress, lungs sounds clear  CV:  Palpation and auscultation of heart done , regular rate and rhythm, no murmur, rub, or gallop, edema +1 pitting depending edema of left arm present.   ABDOMEN:  normal bowel sounds, soft, nontender, no hepatosplenomegaly or other masses  M/S:  Gait and station unsafe without assistance,   SKIN:  Inspection and palpation of skin and subcutaneous tissue at baseline  PSYCH:  insight and judgement, memory severe impairment, affect and mood somnolent but pleasant and apologetic that she can't stay awake    Lab/Diagnostic data: Hospital labs reviewed.    ASSESSMENT/PLAN:  Closed supracondylar fracture  of left humerus with routine healing, subsequent encounter  Fall from standing. Currently non weight bearing on Left upper Extrem. She is not following this restriction due to her confusion.  - pain managed with tramadol and tylenol  - sling for left upper arm  - Physical Therapy / Ocupational Therapy      Type 2 diabetes mellitus with hyperglycemia, unspecified long term insulin use status (H)  Admitted to TCU on lantus 37 units sq everyday and 2 units novolog TID with meals and QID sliding scale novolog. Blood sugars have been controled between 120 - 250.   Spot check during visit was 142  - D/c bedtime sliding scale    Cognitive impairment / / Delirium  ACL during last TCU stay in august was 4.0/5.8 with slums 22/30. ACL now 3.6 and SLUMS 5 / 30. Somnolent during visit and difficult to stay awake. Not using much tramadol. Blood sugar check 142. Likely related to delirium and medications.   I suspect she will clear some with the resolution of her delirium.  - SLP to eval and treat cognitive impairment  - Ocupational Therapy / Ocupational Therapy / SLP to assess safety of current living arrangements.    Hx of seizure disorder  On keppra  - no changes    Chronic atrial fibrillation (H)  Rate controlled on amiodarone, metoprolol  Stroke prevention with warfarin   Home dose 5 mg MWF and 2.5 mg T,TH,Sa,Tejeda      Left message with dtr as I would like to go through med rec.      Orders:  1. D/C Sliding Scale Novolog at bedtime.    Electronically signed by:  Manda Molina NP

## 2017-12-08 PROBLEM — R41.0 DELIRIUM: Status: ACTIVE | Noted: 2017-12-08

## 2017-12-08 PROBLEM — S42.412D CLOSED SUPRACONDYLAR FRACTURE OF LEFT HUMERUS WITH ROUTINE HEALING, SUBSEQUENT ENCOUNTER: Status: ACTIVE | Noted: 2017-12-08

## 2017-12-08 PROBLEM — Z86.69 HX OF SEIZURE DISORDER: Status: ACTIVE | Noted: 2017-12-08

## 2017-12-10 ENCOUNTER — TELEPHONE (OUTPATIENT)
Dept: GERIATRICS | Facility: CLINIC | Age: 77
End: 2017-12-10

## 2017-12-10 NOTE — TELEPHONE ENCOUNTER
On Coumadin due to a fib. INR today 2.7  Previously INR 12/7 was 2.5 and took 2.5 mg PO x 2 days and 5 mg one    PLAN  Coumadin 3 mg PO daily  INR check 12/12    Electronically signed by MABEL Restrepo, GNP

## 2017-12-13 ENCOUNTER — NURSING HOME VISIT (OUTPATIENT)
Dept: GERIATRICS | Facility: CLINIC | Age: 77
End: 2017-12-13
Payer: MEDICARE

## 2017-12-13 VITALS
BODY MASS INDEX: 30.62 KG/M2 | SYSTOLIC BLOOD PRESSURE: 145 MMHG | WEIGHT: 184 LBS | HEART RATE: 60 BPM | DIASTOLIC BLOOD PRESSURE: 65 MMHG | TEMPERATURE: 97.8 F | RESPIRATION RATE: 18 BRPM

## 2017-12-13 DIAGNOSIS — N18.30 CKD (CHRONIC KIDNEY DISEASE) STAGE 3, GFR 30-59 ML/MIN (H): ICD-10-CM

## 2017-12-13 DIAGNOSIS — Z79.4 TYPE 2 DIABETES MELLITUS WITH HYPERGLYCEMIA, WITH LONG-TERM CURRENT USE OF INSULIN (H): ICD-10-CM

## 2017-12-13 DIAGNOSIS — G40.909 SEIZURE DISORDER (H): ICD-10-CM

## 2017-12-13 DIAGNOSIS — I48.20 CHRONIC ATRIAL FIBRILLATION (H): ICD-10-CM

## 2017-12-13 DIAGNOSIS — I50.32 CHRONIC DIASTOLIC CONGESTIVE HEART FAILURE (H): ICD-10-CM

## 2017-12-13 DIAGNOSIS — R29.6 FALLS FREQUENTLY: ICD-10-CM

## 2017-12-13 DIAGNOSIS — E11.65 TYPE 2 DIABETES MELLITUS WITH HYPERGLYCEMIA, WITH LONG-TERM CURRENT USE OF INSULIN (H): ICD-10-CM

## 2017-12-13 DIAGNOSIS — S42.412D CLOSED SUPRACONDYLAR FRACTURE OF LEFT HUMERUS WITH ROUTINE HEALING, SUBSEQUENT ENCOUNTER: Primary | ICD-10-CM

## 2017-12-13 DIAGNOSIS — Z71.89 ADVANCED DIRECTIVES, COUNSELING/DISCUSSION: ICD-10-CM

## 2017-12-13 PROCEDURE — 99306 1ST NF CARE HIGH MDM 50: CPT | Performed by: FAMILY MEDICINE

## 2017-12-13 NOTE — PROGRESS NOTES
[place] [facility]  PRIMARY CARE PROVIDER AND CLINIC:  Krystle Sen [facility] [address] [facility] [address] / [facility]*  Chief Complaint   Patient presents with     Hospital F/U       HPI:    Diya Cruz is a 77 year old  (1940),admitted to the [facility]  from [facility].  Hospital stay 11/28/17 through 12/4/17.  Admitted to this facility for  rehab, medical management and nursing care.  HPI information obtained from: facility chart records, facility staff, patient report, [facility] chart review and Care Everywhere [facility] chart review.      Current issues are:      1. Closed supracondylar fracture of left humerus with routine healing, subsequent encounter - minimal pain, in sling, therapy for arm on hold until after ortho f/u- may still need ORIF   2. Chronic atrial fibrillation (H) - stable, rate controlled,on coumadin, monitored   3. CKD (chronic kidney disease) stage 3, GFR 30-59 ml/min - stable, monitor   4. Seizure disorder (H) - no recent seizure activity, on meds   5. Chronic diastolic congestive heart failure (H) - stable, denies CP or SOB   6. Type 2 diabetes mellitus with hyperglycemia, with long-term current use of insulin (H) - improving BS, cont monitor   7. Falls frequently - full therapy eval beginning   8. Advanced directives, counseling/discussion - Confirmed DNR/DNI, POLST in process. NO AD,daughter Maria emergency contact and preferred proxy        CODE STATUS/ADVANCE DIRECTIVES DISCUSSION:   DNR / DNI  Patient's living condition: lives alone    ALLERGIES:Ambien [zolpidem]; Azithromycin; Clindamycin; Codeine; Lisinopril; Nitrofurantoin; Simvastatin; and Sulfa drugs  PAST MEDICAL HISTORY:  has a past medical history of Abdominal aortic stenosis; Anxiety; Basal cell carcinoma; Carotid stenosis; Cerebrovascular accident (CVA) (H) (8/19/2013); Claudication (H); Compression fracture; Depressive disorder; GI bleed; Glaucoma;  Hyperlipidemia; Meniere syndrome; Obesity; Obstructive sleep apnea (adult) (pediatric); Peripheral vascular disease, unspecified (H); Pure hypercholesterolemia; Rheumatoid arthritis(714.0); Seizure disorder (H); Sjogren's disease (H); Type II or unspecified type diabetes mellitus without mention of complication, not stated as uncontrolled; Unspecified cerebral artery occlusion with cerebral infarction; Unspecified epilepsy without mention of intractable epilepsy; and Unspecified essential hypertension.  PAST SURGICAL HISTORY:  has a past surgical history that includes tonsillectomy; Hysterectomy; GYN surgery; Breast surgery; Head and neck surgery; Cardiac surgery; Cardiac surgery; Abdomen surgery; vascular surgery; Phacoemulsification with standard intraocular lens implant (6/26/2014); cataract iol, rt/lt (2010); and Laser YAG capsulotomy (Bilateral, 8/27/2015).  FAMILY HISTORY: family history is not on file.  SOCIAL HISTORY:  reports that she has quit smoking. Her smoking use included Cigarettes. She has a 30.00 pack-year smoking history. She does not have any smokeless tobacco history on file. She reports that she does not drink alcohol or use illicit drugs.    Post Discharge Medication Reconciliation Status: discharge medications reconciled, continue medications without change.  Current Outpatient Prescriptions   Medication Sig Dispense Refill     senna-docusate (SENOKOT-S;PERICOLACE) 8.6-50 MG per tablet Take 1 tablet by mouth 2 times daily       TRAMADOL HCL PO Take 50 mg by mouth every 6 hours as needed for moderate to severe pain       PRAVASTATIN SODIUM PO Take 40 mg by mouth every evening       furosemide (LASIX) 40 MG tablet Take 1 tablet (40 mg) by mouth daily 30 tablet 0     amiodarone (PACERONE/CODARONE) 200 MG tablet Take 1 tablet (200 mg) by mouth daily 30 tablet 0     metoprolol (LOPRESSOR) 50 MG tablet Take 1 tablet (50 mg) by mouth 2 times daily 60 tablet 0     warfarin (COUMADIN) 5 MG tablet Take  1 tablet (5 mg) by mouth daily Take as directed per INR results 30 tablet 0     MELATONIN PO Take 3 mg by mouth At Bedtime        FOLIC ACID PO Take 1 mg by mouth daily       OMEPRAZOLE PO Take 20 mg by mouth daily as needed       GABAPENTIN PO Take 300 mg by mouth 3 times daily        acetaminophen (TYLENOL) 325 MG tablet Take 650 mg by mouth every 6 hours as needed for pain        latanoprost (XALATAN) 0.005 % ophthalmic solution Place 1 drop into both eyes At Bedtime       timolol (TIMOPTIC) 0.5 % ophthalmic solution Place 1 drop into both eyes 2 times daily Hold if HR <60       nitroglycerin (NITROSTAT) 0.4 MG sublingual tablet Place 0.4 mg under the tongue every 5 minutes as needed for chest pain For chest pain place 1 tablet under the tongue every 5 minutes for 3 doses. If symptoms persist 5 minutes after 1st dose call 911.       loratadine (CLARITIN) 10 MG tablet Take 10 mg by mouth daily as needed for allergies       levETIRAcetam (KEPPRA) 500 MG tablet Take 500 mg by mouth 2 times daily       DULoxetine (CYMBALTA) 60 MG EC capsule Take 60 mg by mouth 2 times daily       insulin aspart (NOVOLOG FLEXPEN) 100 UNIT/ML injection Inject 2 Units Subcutaneous 3 times daily (with meals) And Inject per sliding scale <250 =0 units, 120-149= 1 unit, 150-199= 2 units, 200-249= 4 units, 250-299= 6 units, 300-349= 8 units, 350 or higher = 10 units Recheck in 2 hours if still >350 call MD       insulin glargine (LANTUS) 100 UNIT/ML injection Inject 37 Units Subcutaneous At Bedtime          ROS:  10 point ROS of systems including Constitutional, Eyes, Respiratory, Cardiovascular, Gastroenterology, Genitourinary, Integumentary, Muscularskeletal, Psychiatric were all negative except for pertinent positives noted in my HPI.    Exam:  /65  Pulse 60  Temp 97.8  F (36.6  C)  Resp 18  Wt 184 lb (83.5 kg)  BMI 30.62 kg/m2  GENERAL APPEARANCE:  Alert, in no distress, oriented, cooperative  ENT:  Mouth and posterior  oropharynx normal, moist mucous membranes, [place]  EYES:  EOM, conjunctivae, lids, pupils and irises normal  NECK:  No adenopathy,masses or thyromegaly  RESP:  respiratory effort and palpation of chest normal, lungs clear to auscultation , no respiratory distress  CV:  Palpation and auscultation of heart done , peripheral edema 1-2+ in le, irregular rhythm afib  ABDOMEN:  normal bowel sounds, soft, nontender, no hepatosplenomegaly or other masses  M/S:   Gait and station abnormal Unsteady gait  SKIN:  Inspection of skin and subcutaneous tissuebruising   NEURO:   Cranial nerves 2-12 are normal tested and grossly at patient's baseline  PSYCH:  oriented X 3, normal insight, judgement and memory    Lab/Diagnostic data:        ASSESSMENT/PLAN:  1. Closed supracondylar fracture of left humerus with routine healing, subsequent encounter - minimal pain, in sling, therapy for arm on hold until after ortho f/u- may still need ORIF, cont monitor   2. Chronic atrial fibrillation (H) - stable, rate controlled,on coumadin, monitor INR adjust coumadin as needed   3. CKD (chronic kidney disease) stage 3, GFR 30-59 ml/min - stable, monitor   4. Seizure disorder (H) - no recent seizure activity, on meds, monitor   5. Chronic diastolic congestive heart failure (H) - stable, denies CP or SOB   6. Type 2 diabetes mellitus with hyperglycemia, with long-term current use of insulin (H) - improving BS, cont monitor   7. Falls frequently - full therapy eval beginning   8. Advanced directives, counseling/discussion - Confirmed DNR/DNI, POLST in process. NO AD,daughter Maria emergency contact and preferred proxy      Lives alone in senior housing at present. Unclear if she will be able to return there. Await full therapy eval          Electronically signed by:  Hitesh Lau MD

## 2017-12-14 PROBLEM — R29.6 FALLS FREQUENTLY: Status: ACTIVE | Noted: 2017-12-14

## 2017-12-14 PROBLEM — Z71.89 ADVANCED DIRECTIVES, COUNSELING/DISCUSSION: Status: ACTIVE | Noted: 2017-12-14

## 2017-12-21 VITALS
HEART RATE: 61 BPM | TEMPERATURE: 97.8 F | WEIGHT: 185 LBS | DIASTOLIC BLOOD PRESSURE: 75 MMHG | BODY MASS INDEX: 30.79 KG/M2 | RESPIRATION RATE: 18 BRPM | SYSTOLIC BLOOD PRESSURE: 165 MMHG

## 2017-12-21 NOTE — PROGRESS NOTES
[place] [facility]    Chief Complaint   Patient presents with     RECHECK       HPI:    Diya Cruz is a 77 year old  (1940), who is being seen today for an episodic care visit at [facility] .  HPI information obtained from: facility chart records, facility staff and patient report.Today's concern is:     Chronic atrial fibrillation (H)  Type 2 diabetes mellitus with hyperglycemia, with long-term current use of insulin (H)  Closed supracondylar fracture of left humerus with routine healing, subsequent encounter  Cognitive impairment  Insomnia, unspecified type    Nursing reports that daughter wants melatonin increased    ALLERGIES: Ambien [zolpidem]; Azithromycin; Clindamycin; Codeine; Lisinopril; Nitrofurantoin; Simvastatin; and Sulfa drugs  Past Medical, Surgical, Family and Social History reviewed and updated in EPIC.    REVIEW OF SYSTEMS:  4 point ROS including Respiratory, CV, GI and , other than that noted in the HPI,  is negative    Physical Exam:  /75  Pulse 61  Temp 97.8  F (36.6  C)  Resp 18  Wt 185 lb (83.9 kg)  BMI 30.79 kg/m2  GENERAL APPEARANCE:  Alert, in no distress  PSYCH:  insight and judgement, memory forgetful, affect and mood normal     Recent Labs: n/a    Assessment/Plan:  Chronic atrial fibrillation (H)      Type 2 diabetes mellitus with hyperglycemia, with long-term current use of insulin (H)  Admitted to TCU on lantus 37 units sq everyday and 2 units novolog TID with meals and QID sliding scale novolog. Blood sugars have been controled between 120 - 250.   - continue current insulin orders    Closed supracondylar fracture of left humerus with routine healing, subsequent encounter  Fall from standing. Currently non weight bearing on Left upper Extrem. She is not following this restriction due to her confusion.  - pain managed with tramadol and tylenol  - sling for left upper arm  - Physical Therapy / Ocupational Therapy      Cognitive  impairment  ACL during last TCU stay in august was 4.0/5.8 with slums 22/30.  Delirium has improved since my last visit  - SLP to eval and treat cognitive impairment  - Ocupational Therapy / Physical Therapy  / SLP to assess safety of current living arrangements.    Insomnia, unspecified type  Nursing states that daughter requests melatonin be increased because she is not sleeping. No recent documentation that she is not sleeping. Resident states she is not sleeping well at night.  - ok to increase melatonin to 5 mg at HS    Orders:  Increase melatonin to 5 mg po at HS dx insomnia      Electronically signed by  Manda Molina NP

## 2017-12-22 ENCOUNTER — NURSING HOME VISIT (OUTPATIENT)
Dept: GERIATRICS | Facility: CLINIC | Age: 77
End: 2017-12-22
Payer: MEDICARE

## 2017-12-22 DIAGNOSIS — R41.89 COGNITIVE IMPAIRMENT: ICD-10-CM

## 2017-12-22 DIAGNOSIS — G47.00 INSOMNIA, UNSPECIFIED TYPE: ICD-10-CM

## 2017-12-22 DIAGNOSIS — S42.412D CLOSED SUPRACONDYLAR FRACTURE OF LEFT HUMERUS WITH ROUTINE HEALING, SUBSEQUENT ENCOUNTER: ICD-10-CM

## 2017-12-22 DIAGNOSIS — E11.65 TYPE 2 DIABETES MELLITUS WITH HYPERGLYCEMIA, WITH LONG-TERM CURRENT USE OF INSULIN (H): ICD-10-CM

## 2017-12-22 DIAGNOSIS — I48.20 CHRONIC ATRIAL FIBRILLATION (H): Primary | ICD-10-CM

## 2017-12-22 DIAGNOSIS — Z79.4 TYPE 2 DIABETES MELLITUS WITH HYPERGLYCEMIA, WITH LONG-TERM CURRENT USE OF INSULIN (H): ICD-10-CM

## 2017-12-22 PROCEDURE — 99309 SBSQ NF CARE MODERATE MDM 30: CPT | Performed by: NURSE PRACTITIONER

## 2017-12-26 ENCOUNTER — NURSING HOME VISIT (OUTPATIENT)
Dept: GERIATRICS | Facility: CLINIC | Age: 77
End: 2017-12-26
Payer: MEDICARE

## 2017-12-26 VITALS
BODY MASS INDEX: 30.82 KG/M2 | DIASTOLIC BLOOD PRESSURE: 74 MMHG | HEART RATE: 59 BPM | SYSTOLIC BLOOD PRESSURE: 132 MMHG | WEIGHT: 185.2 LBS | RESPIRATION RATE: 20 BRPM | TEMPERATURE: 97 F

## 2017-12-26 DIAGNOSIS — Z51.81 ENCOUNTER FOR THERAPEUTIC DRUG MONITORING: ICD-10-CM

## 2017-12-26 DIAGNOSIS — I48.20 CHRONIC ATRIAL FIBRILLATION (H): Primary | ICD-10-CM

## 2017-12-26 DIAGNOSIS — Z79.01 LONG TERM CURRENT USE OF ANTICOAGULANT THERAPY: ICD-10-CM

## 2017-12-26 PROCEDURE — 99308 SBSQ NF CARE LOW MDM 20: CPT | Performed by: NURSE PRACTITIONER

## 2017-12-26 NOTE — PROGRESS NOTES
[place] [facility]    HPI:    Diya Cruz is a 77 year old  (1940), who is being seen today for an episodic care visit at [facility].   HPI information obtained from: facility chart records, facility staff, patient report and [facility] chart review. Today's concern is INR/Coumadin management for JHON. Fib    Bleeding Signs/Symptoms:  None  Thromboembolic Signs/Symptoms:  None    Medication Changes:  No  Dietary Changes:  No  Activity Changes: No  Bacterial/Viral Infection:  No    Missed Coumadin Doses:  None    On ASA: No    Other Concerns:  No    OBJECTIVE:    INR Today:  2.1  Current Dose:  3mg Daily    ASSESSMENT:     Chronic atrial fibrillation (H)  Long term current use of anticoagulant therapy  Encounter for therapeutic drug monitoring   Therapeutic INR for goal of 2-3    PLAN:    New Dose: Warfarin 5mg PO Mon and Thur. And 3mg PO Tues, Wed, Fri, Sat, Sun      Next INR: 1 week    Manda Molina NP

## 2018-01-09 ENCOUNTER — RECORDS - HEALTHEAST (OUTPATIENT)
Dept: LAB | Facility: CLINIC | Age: 78
End: 2018-01-09

## 2018-01-09 ENCOUNTER — NURSING HOME VISIT (OUTPATIENT)
Dept: GERIATRICS | Facility: CLINIC | Age: 78
End: 2018-01-09
Payer: MEDICARE

## 2018-01-09 VITALS
HEART RATE: 62 BPM | RESPIRATION RATE: 16 BRPM | TEMPERATURE: 97.5 F | SYSTOLIC BLOOD PRESSURE: 151 MMHG | DIASTOLIC BLOOD PRESSURE: 75 MMHG

## 2018-01-09 DIAGNOSIS — D64.9 ANEMIA, UNSPECIFIED TYPE: Primary | ICD-10-CM

## 2018-01-09 DIAGNOSIS — I50.32 CHRONIC DIASTOLIC CONGESTIVE HEART FAILURE (H): ICD-10-CM

## 2018-01-09 DIAGNOSIS — E11.65 TYPE 2 DIABETES MELLITUS WITH HYPERGLYCEMIA, WITH LONG-TERM CURRENT USE OF INSULIN (H): ICD-10-CM

## 2018-01-09 DIAGNOSIS — R53.83 FATIGUE, UNSPECIFIED TYPE: ICD-10-CM

## 2018-01-09 DIAGNOSIS — Z79.4 TYPE 2 DIABETES MELLITUS WITH HYPERGLYCEMIA, WITH LONG-TERM CURRENT USE OF INSULIN (H): ICD-10-CM

## 2018-01-09 LAB — HGB BLD-MCNC: 10.2 G/DL (ref 12–16)

## 2018-01-09 PROCEDURE — 99309 SBSQ NF CARE MODERATE MDM 30: CPT | Performed by: NURSE PRACTITIONER

## 2018-01-09 NOTE — PROGRESS NOTES
[place] [facility]    Chief Complaint   Patient presents with     Nursing Home Acute       HPI:    Diya Cruz is a 77 year old  (1940), who is being seen today for an episodic care visit at [facility] .  HPI information obtained from: facility chart records, facility staff and patient report.Today's concern is:     Anemia, unspecified type  Fatigue, unspecified type  Type 2 diabetes mellitus with hyperglycemia, with long-term current use of insulin (H)  Chronic diastolic congestive heart failure (H)       ALLERGIES: Ambien [zolpidem]; Azithromycin; Clindamycin; Codeine; Lisinopril; Nitrofurantoin; Simvastatin; and Sulfa drugs  Past Medical, Surgical, Family and Social History reviewed and updated in EPIC.    REVIEW OF SYSTEMS:  4 point ROS including Respiratory, CV, GI and , other than that noted in the HPI,  is negative. Reports feeling fatigued, and that clothes are fitting tighter.     Physical Exam:  /75  Pulse 62  Temp 97.5  F (36.4  C)  Resp 16  GENERAL APPEARANCE:  Alert, in no distress  RESP:  respiratory effort and palpation of chest normal, no respiratory distress, lungs sounds clear  CV:  Palpation and auscultation of heart done , regular rate and rhythm, no murmur, rub, or gallop, edema slight non pitting bilateral lower extrem  ABDOMEN:  normal bowel sounds, soft, nontender, no hepatosplenomegaly or other masses  M/S:  Gait and station walks with assist , no tenderness or swelling of the joints   SKIN:  Inspection and palpation of skin and subcutaneous tissue at baseline  PSYCH:  insight and judgement, memory intact, affect and mood normal     Recent Labs:  hgb 10.5    Assessment/Plan:  Anemia, unspecified type  Yesterday patient requested a hgb check due to feeling tired. Hx of anemia and requiring blood transfusing. hgb today 10.5 was 9.5 in sept.    Type 2 diabetes mellitus with hyperglycemia, with long-term current use of insulin (H)  On lantus 37  units and novolog 2 units with meals.  Blood sugars reviewed and some AM checks have been 65 in the last couple weeks.  - decrease lantus to 32 units sq at hs    Chronic diastolic congestive heart failure (H) / / Fatigue, unspecified type  Weight is up 5 pounds in last 7 days though daily weights only show slow increase with c/o fatigue and feeling bloated will add additional lasix.       Orders:  1. additonal lasix 40mg PO QD x2 days at noon. Dx: CHF  2. Decreasse lantus to 32units SQ in evening Dx: Dm2  3. INR 2.4. Warfarin PO    5mg Mon, Thur,    3mg Tues, Wed, Fri, Sat, Sun  Recheck INR in 2 weeks      Electronically signed by  Manda Molina NP

## 2018-01-15 ENCOUNTER — NURSING HOME VISIT (OUTPATIENT)
Dept: GERIATRICS | Facility: CLINIC | Age: 78
End: 2018-01-15
Payer: MEDICARE

## 2018-01-15 DIAGNOSIS — Z79.4 TYPE 2 DIABETES MELLITUS WITH HYPERGLYCEMIA, WITH LONG-TERM CURRENT USE OF INSULIN (H): Primary | ICD-10-CM

## 2018-01-15 DIAGNOSIS — I10 BENIGN ESSENTIAL HYPERTENSION: ICD-10-CM

## 2018-01-15 DIAGNOSIS — L98.9 SKIN PROBLEM: ICD-10-CM

## 2018-01-15 DIAGNOSIS — E11.65 TYPE 2 DIABETES MELLITUS WITH HYPERGLYCEMIA, WITH LONG-TERM CURRENT USE OF INSULIN (H): Primary | ICD-10-CM

## 2018-01-15 PROCEDURE — 99309 SBSQ NF CARE MODERATE MDM 30: CPT | Performed by: NURSE PRACTITIONER

## 2018-01-15 NOTE — PROGRESS NOTES
[place] [facility]    Chief Complaint   Patient presents with     Nursing Home Acute       HPI:    Diya Cruz is a 77 year old  (1940), who is being seen today for an episodic care visit at [facility]. HPI information obtained from: facility chart records, facility staff and patient report.    Today's concern is:     Type 2 diabetes mellitus with hyperglycemia, with long-term current use of insulin (H)  Benign essential hypertension  Skin problem    See assessment / plan for HPI     ALLERGIES: Ambien [zolpidem]; Azithromycin; Clindamycin; Codeine; Lisinopril; Nitrofurantoin; Simvastatin; and Sulfa drugs  Past Medical, Surgical, Family and Social History reviewed and updated in EPIC.    REVIEW OF SYSTEMS:  4 point ROS including Respiratory, CV, GI and , other than that noted in the HPI,  is negative    Physical Exam:  Pulse 61  Temp 98.7  F (37.1  C)  Resp 16  Wt 190 lb (86.2 kg)  BMI 31.62 kg/m2  GENERAL APPEARANCE:  Alert, in no distress  SKIN:  Inspection and palpation of skin and subcutaneous tissue: feet and ankles with petechia no raised rash. Some scratch marks present form patient scratching.  Right breast around nipple. 2 papules located on the lateral side of her right nipple in the areola.   PSYCH:  insight and judgement, memory inatct, affect and mood normal     Recent Labs:  Labs reviewed in nursing home records    Assessment/Plan:  Type 2 diabetes mellitus with hyperglycemia, with long-term current use of insulin (H)  Glargine decreased last week due to morning blood sugars in the 60s.  Now today nurses report elevated blood sugars in the 250-350 range.  -Increase mealtime NovoLog    Benign essential hypertension  Blood pressures elevated 150s-160s.  Has several allergies to blood pressure medications additional Lasix that she got last week did not help to lower blood pressure will add Norvasc    Skin problem  Already has ordered for antifungal cream will add  hydrocortisone 1% cream to rash.      Orders:  1. norvasc 5 mg po every day for htn  2.  Hydrocortisone 1% cream applied to rash areas on ankles and feet small amount to right breast and a small amount to the right cheek.  3.  Increase mealtime NovoLog to 4 units dx dm2      Electronically signed by  Manda Molina NP

## 2018-01-16 VITALS — RESPIRATION RATE: 16 BRPM | BODY MASS INDEX: 31.62 KG/M2 | TEMPERATURE: 98.7 F | WEIGHT: 190 LBS | HEART RATE: 61 BPM

## 2018-01-16 PROBLEM — L98.9 SKIN PROBLEM: Status: ACTIVE | Noted: 2018-01-16

## 2018-01-19 ENCOUNTER — NURSING HOME VISIT (OUTPATIENT)
Dept: GERIATRICS | Facility: CLINIC | Age: 78
End: 2018-01-19
Payer: MEDICARE

## 2018-01-19 VITALS
BODY MASS INDEX: 31.12 KG/M2 | DIASTOLIC BLOOD PRESSURE: 69 MMHG | SYSTOLIC BLOOD PRESSURE: 163 MMHG | HEART RATE: 60 BPM | WEIGHT: 187 LBS | TEMPERATURE: 99.1 F | RESPIRATION RATE: 14 BRPM

## 2018-01-19 DIAGNOSIS — R60.0 BILATERAL LEG EDEMA: ICD-10-CM

## 2018-01-19 DIAGNOSIS — I50.32 CHRONIC DIASTOLIC CONGESTIVE HEART FAILURE (H): Primary | ICD-10-CM

## 2018-01-19 DIAGNOSIS — R63.5 WEIGHT GAIN: ICD-10-CM

## 2018-01-19 DIAGNOSIS — L98.9 SKIN PROBLEM: ICD-10-CM

## 2018-01-19 PROCEDURE — 99309 SBSQ NF CARE MODERATE MDM 30: CPT | Performed by: NURSE PRACTITIONER

## 2018-01-19 NOTE — PROGRESS NOTES
[place] [facility]    Chief Complaint   Patient presents with     Nursing Home Acute       HPI:    Diya Cruz is a 77 year old  (1940), who is being seen today for an episodic care visit at [facility]. HPI information obtained from: facility chart records, facility staff and patient report.    Today's concern is:     Chronic diastolic congestive heart failure (H)  Skin problem  Bilateral leg edema  Weight gain    See assessment / plan for HPI     ALLERGIES: Ambien [zolpidem]; Azithromycin; Clindamycin; Codeine; Lisinopril; Nitrofurantoin; Simvastatin; and Sulfa drugs  Past Medical, Surgical, Family and Social History reviewed and updated in EPIC.    REVIEW OF SYSTEMS:  4 point ROS including Respiratory, CV, GI and , other than that noted in the HPI,  is negative    Physical Exam:  /69  Pulse 60  Temp 99.1  F (37.3  C)  Resp 14  Wt 187 lb (84.8 kg)  BMI 31.12 kg/m2  GENERAL APPEARANCE:  Alert, in no distress  Resp; non labored, LS clear  CV: regular  PSYCH:  insight and judgement, memory intact, affect and mood normal     Recent Labs:  reviewed    Assessment/Plan:  Chronic diastolic congestive heart failure (H) / / Bilateral leg edema / / weight gain.  Weight 190lb up 3 pounds from yesterday. Nonpitting lower extrem edema present. No respi distress, orthopnea. Lung sounds clear.   - increase lasix   - check bmp    Skin problem  Nursing reports spreading. She does have some areas of scaling on her fingers that were not there last week  - apply hydrocortisone cream to areas on hands  - good lotion recommended - cerave/aquaphor/vanicream    Orders:  1. Increase Furosemide to 40mg PO BID Dx: HTN  2. Potassium Chloride 40mcg PO QD. Dx: HTN  3. BMP on Monday Dx: HTN      Electronically signed by  Manda Molina NP

## 2018-01-22 ENCOUNTER — TRANSFERRED RECORDS (OUTPATIENT)
Dept: HEALTH INFORMATION MANAGEMENT | Facility: CLINIC | Age: 78
End: 2018-01-22

## 2018-01-22 ENCOUNTER — RECORDS - HEALTHEAST (OUTPATIENT)
Dept: LAB | Facility: CLINIC | Age: 78
End: 2018-01-22

## 2018-01-22 LAB
ANION GAP SERPL CALCULATED.3IONS-SCNC: 8 MMOL/L (ref 5–18)
ANION GAP SERPL CALCULATED.3IONS-SCNC: 8 MMOL/L (ref 5–18)
BUN SERPL-MCNC: 23 MG/DL (ref 8–28)
BUN SERPL-MCNC: 23 MG/DL (ref 8–28)
CALCIUM SERPL-MCNC: 8.9 MG/DL (ref 8.5–10.5)
CALCIUM SERPL-MCNC: 8.9 MG/DL (ref 8.5–10.5)
CHLORIDE BLD-SCNC: 104 MMOL/L (ref 98–107)
CHLORIDE SERPLBLD-SCNC: 104 MMOL/L (ref 98–107)
CO2 SERPL-SCNC: 27 MMOL/L (ref 22–31)
CO2 SERPL-SCNC: 27 MMOL/L (ref 22–31)
CREAT SERPL-MCNC: 1.2 MG/DL (ref 0.6–1.1)
CREAT SERPL-MCNC: 1.2 MG/DL (ref 0.6–1.1)
GFR SERPL CREATININE-BSD FRML MDRD: 44 ML/MIN/1.73M2
GFR SERPL CREATININE-BSD FRML MDRD: 44 ML/MIN/1.73M2
GLUCOSE BLD-MCNC: 203 MG/DL (ref 70–125)
GLUCOSE SERPL-MCNC: 203 MG/DL (ref 70–125)
POTASSIUM BLD-SCNC: 4 MMOL/L (ref 3.5–5)
POTASSIUM SERPL-SCNC: 4 MMOL/L (ref 3.5–5)
SODIUM SERPL-SCNC: 139 MMOL/L (ref 136–145)
SODIUM SERPL-SCNC: 139 MMOL/L (ref 136–145)

## 2018-01-23 ENCOUNTER — NURSING HOME VISIT (OUTPATIENT)
Dept: GERIATRICS | Facility: CLINIC | Age: 78
End: 2018-01-23
Payer: MEDICARE

## 2018-01-23 DIAGNOSIS — I48.20 CHRONIC ATRIAL FIBRILLATION (H): Primary | ICD-10-CM

## 2018-01-23 DIAGNOSIS — Z79.01 LONG TERM CURRENT USE OF ANTICOAGULANT THERAPY: ICD-10-CM

## 2018-01-23 DIAGNOSIS — Z51.81 ENCOUNTER FOR THERAPEUTIC DRUG MONITORING: ICD-10-CM

## 2018-01-23 PROCEDURE — 99308 SBSQ NF CARE LOW MDM 20: CPT | Performed by: NURSE PRACTITIONER

## 2018-01-24 ENCOUNTER — NURSING HOME VISIT (OUTPATIENT)
Dept: GERIATRICS | Facility: CLINIC | Age: 78
End: 2018-01-24
Payer: MEDICARE

## 2018-01-24 VITALS
SYSTOLIC BLOOD PRESSURE: 145 MMHG | HEART RATE: 58 BPM | WEIGHT: 183 LBS | BODY MASS INDEX: 30.45 KG/M2 | TEMPERATURE: 98.7 F | DIASTOLIC BLOOD PRESSURE: 77 MMHG | RESPIRATION RATE: 16 BRPM

## 2018-01-24 DIAGNOSIS — Z79.4 TYPE 2 DIABETES MELLITUS WITH HYPERGLYCEMIA, WITH LONG-TERM CURRENT USE OF INSULIN (H): Primary | ICD-10-CM

## 2018-01-24 DIAGNOSIS — I50.32 CHRONIC DIASTOLIC CONGESTIVE HEART FAILURE (H): ICD-10-CM

## 2018-01-24 DIAGNOSIS — E11.65 TYPE 2 DIABETES MELLITUS WITH HYPERGLYCEMIA, WITH LONG-TERM CURRENT USE OF INSULIN (H): Primary | ICD-10-CM

## 2018-01-24 DIAGNOSIS — I10 ESSENTIAL HYPERTENSION: ICD-10-CM

## 2018-01-24 PROCEDURE — 99309 SBSQ NF CARE MODERATE MDM 30: CPT | Performed by: NURSE PRACTITIONER

## 2018-01-24 NOTE — PROGRESS NOTES
[place] [facility]    Chief Complaint   Patient presents with     Nursing Home Acute       HPI:    Diya Cruz is a 77 year old  (1940), who is being seen today for an episodic care visit at [facility]. HPI information obtained from: facility chart records, facility staff and patient report.    Today's concern is:     Type 2 diabetes mellitus with hyperglycemia, with long-term current use of insulin (H)  Chronic diastolic congestive heart failure (H)  Essential hypertension    See assessment / plan for HPI     ALLERGIES: Ambien [zolpidem]; Azithromycin; Clindamycin; Codeine; Lisinopril; Nitrofurantoin; Simvastatin; and Sulfa drugs  Past Medical, Surgical, Family and Social History reviewed and updated in EPIC.    REVIEW OF SYSTEMS:  4 point ROS including Respiratory, CV, GI and , other than that noted in the HPI,  is negative    Physical Exam:  /77  Pulse 58  Temp 98.7  F (37.1  C)  Resp 16  Wt 183 lb (83 kg)  BMI 30.45 kg/m2  GENERAL APPEARANCE:  Alert, in no distress  RESP:  respiratory effort and palpation of chest normal, no respiratory distress, lungs sounds clear  CV:  Palpation and auscultation of heart done , regular rate and rhythm, no murmur, rub, or gallop, edema slight non pitting lower extrem  M/S:  Gait and station ambulates independently, with stanby assist.   PSYCH:  insight and judgement, memory intact, affect and mood normal     Recent Labs:  Reviewed In nursing home records.    Assessment/Plan:  Type 2 diabetes mellitus with hyperglycemia, with long-term current use of insulin (H)  Blood sugars continue to be elevated.  Lantus was increased yesterday.  She will be going to an assisted living that does not manage sliding scale insulin.  -Continue 4 times a day Accu-Cheks  -Continue Lantus 35 units  -Increase NovoLog to 6 units 3 times a day with meals.  -Prefer to have blood sugars on the higher side when she discharges to assisted living in order  to avoid lows.  -She will need to follow-up with her primary care provider regarding management of her insulin    Chronic diastolic congestive heart failure (H)  Lasix was increased last week labs are within normal limits weight is down.  -Continue daily weights  -Continue increased dose of Lasix with potassium supplement  -Check labs on Friday    Essential hypertension  Blood pressures continue to be elevated at times in the 150-170 range.  -Increase Norvasc Norvasc to 10 mg by mouth at bedtime      Orders:  1. Increase novolog to 6 units TID with meals Dx: DM2  2. D/C Sliding scale Novolog  3. Update NP Monday of blood Sugars  4. Increase Norvasc to 10mg PO at bedtime Dx: HTN  5. Check Na, K, Creat on Friday Dx: HTN      Electronically signed by  Manda Molina NP

## 2018-01-25 ENCOUNTER — RECORDS - HEALTHEAST (OUTPATIENT)
Dept: LAB | Facility: CLINIC | Age: 78
End: 2018-01-25

## 2018-01-26 ENCOUNTER — DISCHARGE SUMMARY NURSING HOME (OUTPATIENT)
Dept: GERIATRICS | Facility: CLINIC | Age: 78
End: 2018-01-26
Payer: MEDICARE

## 2018-01-26 VITALS
WEIGHT: 180 LBS | DIASTOLIC BLOOD PRESSURE: 75 MMHG | SYSTOLIC BLOOD PRESSURE: 184 MMHG | BODY MASS INDEX: 29.95 KG/M2 | RESPIRATION RATE: 16 BRPM | HEART RATE: 61 BPM | TEMPERATURE: 98 F

## 2018-01-26 DIAGNOSIS — Z86.69 HX OF SEIZURE DISORDER: ICD-10-CM

## 2018-01-26 DIAGNOSIS — Z79.4 TYPE 2 DIABETES MELLITUS WITH HYPERGLYCEMIA, WITH LONG-TERM CURRENT USE OF INSULIN (H): ICD-10-CM

## 2018-01-26 DIAGNOSIS — S42.412D CLOSED SUPRACONDYLAR FRACTURE OF LEFT HUMERUS WITH ROUTINE HEALING, SUBSEQUENT ENCOUNTER: Primary | ICD-10-CM

## 2018-01-26 DIAGNOSIS — R41.0 DELIRIUM: ICD-10-CM

## 2018-01-26 DIAGNOSIS — G47.00 INSOMNIA, UNSPECIFIED TYPE: ICD-10-CM

## 2018-01-26 DIAGNOSIS — I10 ESSENTIAL HYPERTENSION: ICD-10-CM

## 2018-01-26 DIAGNOSIS — R60.0 BILATERAL LEG EDEMA: ICD-10-CM

## 2018-01-26 DIAGNOSIS — E11.65 TYPE 2 DIABETES MELLITUS WITH HYPERGLYCEMIA, WITH LONG-TERM CURRENT USE OF INSULIN (H): ICD-10-CM

## 2018-01-26 DIAGNOSIS — R41.89 COGNITIVE IMPAIRMENT: ICD-10-CM

## 2018-01-26 DIAGNOSIS — I48.20 CHRONIC ATRIAL FIBRILLATION (H): ICD-10-CM

## 2018-01-26 LAB
CREAT SERPL-MCNC: 1.19 MG/DL (ref 0.6–1.1)
GFR SERPL CREATININE-BSD FRML MDRD: 44 ML/MIN/1.73M2
POTASSIUM BLD-SCNC: 3.8 MMOL/L (ref 3.5–5)
SODIUM SERPL-SCNC: 137 MMOL/L (ref 136–145)

## 2018-01-26 PROCEDURE — 99316 NF DSCHRG MGMT 30 MIN+: CPT | Performed by: NURSE PRACTITIONER

## 2018-01-26 NOTE — PROGRESS NOTES
[place] [facility] DISCHARGE SUMMARY    PATIENT'S NAME: Diya Cruz  YOB: 1940  MEDICAL RECORD NUMBER:  [id number]    PRIMARY CARE PROVIDER AND CLINIC RESPONSIBLE AFTER TRANSFER: Krystle Sen [facility] [address] / [facility]    CODE STATUS/ADVANCE DIRECTIVES DISCUSSION:   DNR / DNI       Allergies   Allergen Reactions     Ambien [Zolpidem] Other (See Comments)     Azithromycin Diarrhea     Clindamycin Diarrhea     Codeine Nausea and Vomiting     Lisinopril Cough     Nitrofurantoin Diarrhea     Simvastatin Diarrhea     Sulfa Drugs      Lightheaded, GI upset       TRANSFERRING PROVIDERS: Aixa Ruggiero MD  DATE OF SNF ADMISSION:    DATE OF SNF (anticipated) DISCHARGE:   DISCHARGE DISPOSITION: { GERIATRIC DISCHARGE DISPOSITION:739293}   Nursing Facility: [facility] stay 17 to 17.     Condition on Discharge:  {FGS Discharge Condition:999059}  Function:  ***  Cognitive Scores: {FGS Co}    Equipment: {walker:921723}    DISCHARGE DIAGNOSIS:   1. Closed supracondylar fracture of left humerus with routine healing, subsequent encounter    2. Type 2 diabetes mellitus with hyperglycemia, with long-term current use of insulin (H)    3. Delirium    4. Hx of seizure disorder    5. Chronic atrial fibrillation (H)    6. Cognitive impairment        HPI Nursing Facility Course:  HPI information obtained from: facility chart records and facility staff.  {FGS DX:750134}    PAST MEDICAL HISTORY:  has a past medical history of Abdominal aortic stenosis; Anxiety; Basal cell carcinoma; Carotid stenosis; Cerebrovascular accident (CVA) (H) (2013); Claudication (H); Compression fracture; Depressive disorder; GI bleed; Glaucoma; Hyperlipidemia; Meniere syndrome; Obesity; Obstructive sleep apnea (adult) (pediatric); Peripheral vascular disease,  unspecified (H); Pure hypercholesterolemia; Rheumatoid arthritis(714.0); Seizure disorder (H); Sjogren's disease (H); Type II or unspecified type diabetes mellitus without mention of complication, not stated as uncontrolled; Unspecified cerebral artery occlusion with cerebral infarction; Unspecified epilepsy without mention of intractable epilepsy; and Unspecified essential hypertension.    DISCHARGE MEDICATIONS:  Current Outpatient Prescriptions   Medication Sig Dispense Refill     POTASSIUM CHLORIDE PO Take 40 mEq by mouth daily       OSELTAMIVIR PHOSPHATE PO Take 30 mg by mouth daily x14 days       AMLODIPINE BESYLATE PO Take 10 mg by mouth daily        Warfarin Sodium (COUMADIN PO) Take as directed by INR current dose 5mg PO Mon and Thur. And 3mg PO Tues, Wed, Fri, Sat, Sun next INR 1/2/18       senna-docusate (SENOKOT-S;PERICOLACE) 8.6-50 MG per tablet Take 1 tablet by mouth 2 times daily as needed        TRAMADOL HCL PO Take 50 mg by mouth every 6 hours as needed for moderate to severe pain       PRAVASTATIN SODIUM PO Take 40 mg by mouth every evening       furosemide (LASIX) 40 MG tablet Take 1 tablet (40 mg) by mouth daily 30 tablet 0     amiodarone (PACERONE/CODARONE) 200 MG tablet Take 1 tablet (200 mg) by mouth daily 30 tablet 0     metoprolol (LOPRESSOR) 50 MG tablet Take 1 tablet (50 mg) by mouth 2 times daily 60 tablet 0     MELATONIN PO Take 5 mg by mouth At Bedtime        FOLIC ACID PO Take 1 mg by mouth daily       OMEPRAZOLE PO Take 20 mg by mouth daily        GABAPENTIN PO Take 300 mg by mouth 3 times daily        acetaminophen (TYLENOL) 325 MG tablet Take 650 mg by mouth every 6 hours as needed for pain        latanoprost (XALATAN) 0.005 % ophthalmic solution Place 1 drop into both eyes At Bedtime       timolol (TIMOPTIC) 0.5 % ophthalmic solution Place 1 drop into both eyes 2 times daily Hold if HR <60       nitroglycerin (NITROSTAT) 0.4 MG sublingual tablet Place 0.4 mg under the tongue every 5  minutes as needed for chest pain For chest pain place 1 tablet under the tongue every 5 minutes for 3 doses. If symptoms persist 5 minutes after 1st dose call 911.       loratadine (CLARITIN) 10 MG tablet Take 10 mg by mouth daily as needed for allergies       levETIRAcetam (KEPPRA) 500 MG tablet Take 500 mg by mouth 2 times daily       DULoxetine (CYMBALTA) 60 MG EC capsule Take 60 mg by mouth 2 times daily       insulin aspart (NOVOLOG FLEXPEN) 100 UNIT/ML injection Inject 6 Units Subcutaneous 3 times daily (with meals)        insulin glargine (LANTUS) 100 UNIT/ML injection Inject 35 Units Subcutaneous At Bedtime          MEDICATION CHANGES/RATIONALE:   ***     ROS:    4 point ROS including Respiratory, CV, GI and , other than that noted in the HPI,  is negative    Physical Exam:   Vitals: /75  Pulse 61  Temp 98  F (36.7  C)  Resp 16  Wt 180 lb (81.6 kg)  BMI 29.95 kg/m2  BMI= Body mass index is 29.95 kg/(m^2).    GENERAL APPEARANCE:  Alert, in no distress    DISCHARGE PLAN:  {NURSING HOME DISCHARGE PLAN:991415}  Patient instructed to follow-up with:  PCP in 7 days      Current [place] scheduled appointments:  No future appointments.    MTM referral needed and placed by this provider: No    Pending labs: none  SNF labs   CBC RESULTS:   Recent Labs   Lab Test 08/16/17 01/03/17   WBC   --   7.7   RBC   --   3.45*   HGB  9.2*  10.4*   HCT   --   31.6*   MCV   --   92   MCH   --   30   MCHC   --   33   RDW   --   14.9*   PLT   --   203       Last Basic Metabolic Panel:  Recent Labs   Lab Test 01/22/18 08/16/17 08/09/17   NA  139  144  140   POTASSIUM  4.0  3.4*  3.7   CHLORIDE  104   --   109*   DION  8.9   --   8.6   CO2  27   --   23   BUN  23   --   24   CR  1.20*  1.07  1.22*   GLC  203*   --   73         Discharge Treatments:  ***  - Ok to discharge to home with current medications and treatments  - Home Physical Therapy / Ocupational Therapy / RN / HHA  - Follow up with Primary care provider in 1  week  - Scripts sent to pharmacy: ***   - Scripts written: ***    TOTAL DISCHARGE TIME:   {TIME:754150}  Electronically signed by:  Parker Hutson

## 2018-01-26 NOTE — PROGRESS NOTES
[place] [facility] DISCHARGE SUMMARY    PATIENT'S NAME: Diya Cruz  YOB: 1940  MEDICAL RECORD NUMBER:  [id number]    PRIMARY CARE PROVIDER AND CLINIC RESPONSIBLE AFTER TRANSFER: Krystle Sen [facility] [address] / [place]*     CODE STATUS/ADVANCE DIRECTIVES DISCUSSION:   DNR / DNI       Allergies   Allergen Reactions     Ambien [Zolpidem] Other (See Comments)     Azithromycin Diarrhea     Clindamycin Diarrhea     Codeine Nausea and Vomiting     Lisinopril Cough     Nitrofurantoin Diarrhea     Simvastatin Diarrhea     Sulfa Drugs      Lightheaded, GI upset       TRANSFERRING PROVIDERS: Manda Molina NP, Aixa Esparza MD  DATE OF SNF ADMISSION:  December / 04 / 2017  DATE OF SNF (anticipated) DISCHARGE: January / 28 / 2018  DISCHARGE DISPOSITION: Assisted Living: [facility] ([facility])   Nursing Facility: [facility] stay 11/28 to 12/4.     Condition on Discharge:  Stable.  Function:  Ambulates without assistive device  Cognitive Scores: BIMS 13/15    Equipment: no aids    DISCHARGE DIAGNOSIS:   1. Closed supracondylar fracture of left humerus with routine healing, subsequent encounter    2. Type 2 diabetes mellitus with hyperglycemia, with long-term current use of insulin (H)    3. Delirium    4. Hx of seizure disorder    5. Chronic atrial fibrillation (H)    6. Cognitive impairment    7. Bilateral leg edema    8. Insomnia, unspecified type    9. Essential hypertension        HPI Nursing Facility Course:  HPI information obtained from: facility chart records and facility staff.  Closed supracondylar fracture of left humerus with routine healing, subsequent encounter  Healing without need for surgery. Working with therapy related to fracture and generalized weakness     Type 2 diabetes mellitus with hyperglycemia, with long-term current use of insulin (H)  Insulin  adjusted at [facility] to lantus 35 units once daily and novolog 6 units three times daily with meals  - recommend she take a copy of her blood sugars with her to any follow up appts  - Recommend she continue this dosing for now even though her blood sugars are a little high as they may improve once she moves to the assisted living.     Delirium  Resolved.    Hx of seizure disorder  No changes to keppra    Chronic atrial fibrillation (H)  Rate controlled. No changes made to meds.   - current dosing of warfarin is 5 mg every Monday and 3 mg all other days.  - she has been stable on this dosing for at least 3 weeks    MILD Cognitive impairment  Slums 22  BIM 13/15    Bilateral leg edema  Lasix increased due to increase in edema    Insomnia, unspecified type  Continues on melatonin    Essential hypertension  Lasix increased due to increase in edema but this was not helpful so amlodipine was added, then increased with last dosage adjustment on 1/22  - follow up with PCP      PAST MEDICAL HISTORY:  has a past medical history of Abdominal aortic stenosis; Anxiety; Basal cell carcinoma; Carotid stenosis; Cerebrovascular accident (CVA) (H) (8/19/2013); Claudication (H); Compression fracture; Depressive disorder; GI bleed; Glaucoma; Hyperlipidemia; Meniere syndrome; Obesity; Obstructive sleep apnea (adult) (pediatric); Peripheral vascular disease, unspecified (H); Pure hypercholesterolemia; Rheumatoid arthritis(714.0); Seizure disorder (H); Sjogren's disease (H); Type II or unspecified type diabetes mellitus without mention of complication, not stated as uncontrolled; Unspecified cerebral artery occlusion with cerebral infarction; Unspecified epilepsy without mention of intractable epilepsy; and Unspecified essential hypertension.    DISCHARGE MEDICATIONS:  Current Outpatient Prescriptions   Medication Sig Dispense Refill     POTASSIUM CHLORIDE PO Take 40 mEq by mouth daily       OSELTAMIVIR PHOSPHATE PO Take 30 mg by mouth daily  x14 days       AMLODIPINE BESYLATE PO Take 10 mg by mouth daily        Warfarin Sodium (COUMADIN PO) Take as directed by INR current dose 5mg PO Mon and Thur. And 3mg PO Tues, Wed, Fri, Sat, Sun next INR 1/2/18       senna-docusate (SENOKOT-S;PERICOLACE) 8.6-50 MG per tablet Take 1 tablet by mouth 2 times daily as needed        PRAVASTATIN SODIUM PO Take 40 mg by mouth every evening       furosemide (LASIX) 40 MG tablet Take 1 tablet (40 mg) by mouth daily 30 tablet 0     amiodarone (PACERONE/CODARONE) 200 MG tablet Take 1 tablet (200 mg) by mouth daily 30 tablet 0     metoprolol (LOPRESSOR) 50 MG tablet Take 1 tablet (50 mg) by mouth 2 times daily 60 tablet 0     MELATONIN PO Take 5 mg by mouth At Bedtime        FOLIC ACID PO Take 1 mg by mouth daily       OMEPRAZOLE PO Take 20 mg by mouth daily        GABAPENTIN PO Take 300 mg by mouth 3 times daily        acetaminophen (TYLENOL) 325 MG tablet Take 650 mg by mouth every 6 hours as needed for pain        latanoprost (XALATAN) 0.005 % ophthalmic solution Place 1 drop into both eyes At Bedtime       timolol (TIMOPTIC) 0.5 % ophthalmic solution Place 1 drop into both eyes 2 times daily Hold if HR <60       nitroglycerin (NITROSTAT) 0.4 MG sublingual tablet Place 0.4 mg under the tongue every 5 minutes as needed for chest pain For chest pain place 1 tablet under the tongue every 5 minutes for 3 doses. If symptoms persist 5 minutes after 1st dose call 911.       loratadine (CLARITIN) 10 MG tablet Take 10 mg by mouth daily as needed for allergies       levETIRAcetam (KEPPRA) 500 MG tablet Take 500 mg by mouth 2 times daily       DULoxetine (CYMBALTA) 60 MG EC capsule Take 60 mg by mouth 2 times daily       insulin aspart (NOVOLOG FLEXPEN) 100 UNIT/ML injection Inject 6 Units Subcutaneous 3 times daily (with meals)        insulin glargine (LANTUS) 100 UNIT/ML injection Inject 35 Units Subcutaneous At Bedtime          MEDICATION CHANGES/RATIONALE:   See HPI above     ROS:     4 point ROS including Respiratory, CV, GI and , other than that noted in the HPI,  is negative    Physical Exam:   Vitals: /75  Pulse 61  Temp 98  F (36.7  C)  Resp 16  Wt 180 lb (81.6 kg)  BMI 29.95 kg/m2  BMI= Body mass index is 29.95 kg/(m^2).    GENERAL APPEARANCE:  Alert, in no distress    DISCHARGE PLAN:  Occupational Therapy, Physical Therapy, Registered Nurse and Home Health Aide  Patient instructed to follow-up with:  PCP in 7 days      Current [place] scheduled appointments:  No future appointments.    MTM referral needed and placed by this provider: No    Pending labs: none  SNF labs:  1/9 hgb 10.2  1/22 Na 139, k 4.0 gluc 203, bun 23, creat 1.20, gfr 44  1/26 k 3.8, na 137, creat 1.19, gfr 44    Discharge Treatments:  - Ok to discharge to assisted living with current medications and treatments  - Home Physical Therapy / Ocupational Therapy / RN / HHA  - Follow up with Primary care provider in 1 week       -- recommendations for PCP, address blood sugars and hypertension.   - Scripts sent to pharmacy: signed orders sent to [facility]   - Scripts written: none    TOTAL DISCHARGE TIME:   Greater than 30 minutes  Electronically signed by:  Manda Molina NP

## 2018-01-30 ENCOUNTER — RECORDS - HEALTHEAST (OUTPATIENT)
Dept: LAB | Facility: CLINIC | Age: 78
End: 2018-01-30

## 2018-01-30 LAB — INR PPP: 1.75 (ref 0.9–1.1)

## 2018-02-20 ENCOUNTER — RECORDS - HEALTHEAST (OUTPATIENT)
Dept: LAB | Facility: CLINIC | Age: 78
End: 2018-02-20

## 2018-02-21 LAB
ALBUMIN SERPL-MCNC: 3 G/DL (ref 3.5–5)
ALBUMIN SERPL-MCNC: 3 G/DL (ref 3.5–5)
ALP SERPL-CCNC: 104 U/L (ref 45–120)
ALT SERPL W P-5'-P-CCNC: 16 U/L (ref 0–45)
ANION GAP SERPL CALCULATED.3IONS-SCNC: 5 MMOL/L (ref 5–18)
AST SERPL W P-5'-P-CCNC: 24 U/L (ref 0–40)
BILIRUB SERPL-MCNC: 0.6 MG/DL (ref 0–1)
BNP SERPL-MCNC: 921 PG/ML (ref 0–137)
BUN SERPL-MCNC: 25 MG/DL (ref 8–28)
CALCIUM SERPL-MCNC: 8.7 MG/DL (ref 8.5–10.5)
CHLORIDE BLD-SCNC: 104 MMOL/L (ref 98–107)
CHOLEST SERPL-MCNC: 127 MG/DL
CO2 SERPL-SCNC: 31 MMOL/L (ref 22–31)
CREAT SERPL-MCNC: 1.14 MG/DL (ref 0.6–1.1)
ERYTHROCYTE [DISTWIDTH] IN BLOOD BY AUTOMATED COUNT: 17.7 % (ref 11–14.5)
FASTING STATUS PATIENT QL REPORTED: ABNORMAL
GFR SERPL CREATININE-BSD FRML MDRD: 46 ML/MIN/1.73M2
GLUCOSE BLD-MCNC: 118 MG/DL (ref 70–125)
HCT VFR BLD AUTO: 35.8 % (ref 35–47)
HDLC SERPL-MCNC: 34 MG/DL
HGB BLD-MCNC: 10.3 G/DL (ref 12–16)
LDLC SERPL CALC-MCNC: 78 MG/DL
MCH RBC QN AUTO: 24.9 PG (ref 27–34)
MCHC RBC AUTO-ENTMCNC: 28.8 G/DL (ref 32–36)
MCV RBC AUTO: 87 FL (ref 80–100)
PLATELET # BLD AUTO: 175 THOU/UL (ref 140–440)
PMV BLD AUTO: 10.2 FL (ref 8.5–12.5)
POTASSIUM BLD-SCNC: 3.7 MMOL/L (ref 3.5–5)
PROT SERPL-MCNC: 6.7 G/DL (ref 6–8)
RBC # BLD AUTO: 4.13 MILL/UL (ref 3.8–5.4)
SODIUM SERPL-SCNC: 140 MMOL/L (ref 136–145)
TRIGL SERPL-MCNC: 77 MG/DL
WBC: 4.6 THOU/UL (ref 4–11)

## 2018-03-05 NOTE — PROGRESS NOTES
[place] [facility]    Chief Complaint   Patient presents with     Establish Care       HPI:    Diya Cruz is a 77 year old  (1940), who is being seen today for a routine visit to establish care at [facility].  HPI information obtained from: facility chart records, facility staff and patient report.    Today's concern is:     Type 2 diabetes mellitus with complication, with long-term current use of insulin (H)  Iron deficiency anemia due to chronic blood loss  Chronic diastolic congestive heart failure (H)  Chronic atrial fibrillation (H)  Skin problem  CKD (chronic kidney disease) stage 3, GFR 30-59 ml/min  Mild episode of recurrent major depressive disorder (H)  Lung nodule < 6cm on CT  Cognitive impairment  Benign essential hypertension       ALLERGIES: Ambien [zolpidem]; Azithromycin; Clindamycin; Codeine; Lisinopril; Nitrofurantoin; Simvastatin; and Sulfa drugs  Past Medical, Surgical, Family and Social History reviewed and updated in EPIC.    REVIEW OF SYSTEMS:  4 point ROS including Respiratory, CV, GI and , other than that noted in the HPI,  is negative.     Physical Exam:  /90  Pulse 62  Temp 97.2  F (36.2  C)  Resp 18  Wt 182 lb (82.6 kg)  BMI 30.29 kg/m2  GENERAL APPEARANCE:  Alert, in no distress  RESP:  respiratory effort and palpation of chest normal, no respiratory distress, lungs sounds fine crackles in left lung  CV:  Palpation and auscultation of heart done , regular rate and rhythm, no murmur, rub, or gallop, edema none  ABDOMEN:  normal bowel sounds, soft, nontender, no hepatosplenomegaly or other masses  M/S:  Gait and station walks independently, gait steady, no tenderness or swelling of the joints   SKIN:  Inspection and palpation of skin and subcutaneous tissue at baseline  PSYCH:  insight and judgement, memory forgetful, affect and mood normal     Recent Labs:  hgb 10.5    Assessment/Plan:  Type 2 diabetes mellitus with complication, with  "long-term current use of insulin (H)  Blood sugars have been 50 - 60's prior to lunch  - decrease novolog with meals  - decrease lantus    Iron deficiency anemia due to chronic blood loss  Last hgb 10.5 back in January. Will recheck    Chronic diastolic congestive heart failure (H)  Stable. Continue beta blocker and diuretic.  - no changes     Chronic atrial fibrillation (H)  Rate controled. On amiodarone, metoprolol. Anticoagulation with warfarin  - continue follow up as directed with cardiology Dr Davis     Benign essential hypertension  Above goal. Home care nurse reports several b/ps over goal  - Increase metoprolol  - recheck in 1 month    Skin problem  Rash ongoing since about November worse on the tops of her thighs, upper arms and hands.   - triamcinilone creame    CKD (chronic kidney disease) stage 3, GFR 30-59 ml/min  Creatinine   Date Value Ref Range Status   01/22/2018 1.20 (A) 0.60 - 1.10 mg/dL Final   ]  Check kidney function with next lab check.     Mild episode of recurrent major depressive disorder (H)  Stable on cymbalta    Lung nodule < 6cm on CT  Follow with pulmonologist has \"Stable 5 mm mixed attenuation left apical nodule\" Last CT scan in July of 2017.    Cognitive impairment  Resides in assisted living and requires help with higher level decision making.  - continue supportive care    Orders:  1. Triamcinolone 1% cream, apply to tops of thigh. Small amount to rash area twice daily until rash resolved  2. Increase metopropolol to 75 mg po twice daily Dx: HTN  3. Decrease Lantus to 25 units once daily Dx: DM2  4. Decrease Novolog to 4 untis TID SQ with meals     Electronically signed by  Manda Molina NP     "

## 2018-03-06 ENCOUNTER — ASSISTED LIVING VISIT (OUTPATIENT)
Dept: GERIATRICS | Facility: CLINIC | Age: 78
End: 2018-03-06
Payer: MEDICARE

## 2018-03-06 VITALS
DIASTOLIC BLOOD PRESSURE: 90 MMHG | SYSTOLIC BLOOD PRESSURE: 144 MMHG | BODY MASS INDEX: 30.29 KG/M2 | WEIGHT: 182 LBS | RESPIRATION RATE: 18 BRPM | TEMPERATURE: 97.2 F | HEART RATE: 62 BPM

## 2018-03-06 DIAGNOSIS — I50.32 CHRONIC DIASTOLIC CONGESTIVE HEART FAILURE (H): ICD-10-CM

## 2018-03-06 DIAGNOSIS — I10 BENIGN ESSENTIAL HYPERTENSION: ICD-10-CM

## 2018-03-06 DIAGNOSIS — N18.30 CKD (CHRONIC KIDNEY DISEASE) STAGE 3, GFR 30-59 ML/MIN (H): ICD-10-CM

## 2018-03-06 DIAGNOSIS — R41.89 COGNITIVE IMPAIRMENT: ICD-10-CM

## 2018-03-06 DIAGNOSIS — E11.8 TYPE 2 DIABETES MELLITUS WITH COMPLICATION, WITH LONG-TERM CURRENT USE OF INSULIN (H): Primary | ICD-10-CM

## 2018-03-06 DIAGNOSIS — I48.20 CHRONIC ATRIAL FIBRILLATION (H): ICD-10-CM

## 2018-03-06 DIAGNOSIS — D50.0 IRON DEFICIENCY ANEMIA DUE TO CHRONIC BLOOD LOSS: ICD-10-CM

## 2018-03-06 DIAGNOSIS — Z79.4 TYPE 2 DIABETES MELLITUS WITH COMPLICATION, WITH LONG-TERM CURRENT USE OF INSULIN (H): Primary | ICD-10-CM

## 2018-03-06 DIAGNOSIS — F33.0 MILD EPISODE OF RECURRENT MAJOR DEPRESSIVE DISORDER (H): ICD-10-CM

## 2018-03-06 DIAGNOSIS — L98.9 SKIN PROBLEM: ICD-10-CM

## 2018-03-06 RX ORDER — BENZOCAINE/MENTHOL 6 MG-10 MG
LOZENGE MUCOUS MEMBRANE 2 TIMES DAILY PRN
COMMUNITY
End: 2019-12-02

## 2018-03-06 RX ORDER — MUPIROCIN 20 MG/G
OINTMENT TOPICAL 3 TIMES DAILY PRN
COMMUNITY

## 2018-03-06 RX ORDER — CLOTRIMAZOLE 1 %
CREAM (GRAM) TOPICAL 2 TIMES DAILY PRN
COMMUNITY
End: 2019-12-02

## 2018-03-07 PROBLEM — R41.0 DELIRIUM: Status: RESOLVED | Noted: 2017-12-08 | Resolved: 2018-03-07

## 2018-03-07 PROBLEM — R91.8 PULMONARY NODULES: Status: ACTIVE | Noted: 2018-03-07

## 2018-04-24 ENCOUNTER — ASSISTED LIVING VISIT (OUTPATIENT)
Dept: GERIATRICS | Facility: CLINIC | Age: 78
End: 2018-04-24
Payer: MEDICARE

## 2018-04-24 DIAGNOSIS — F33.0 MILD EPISODE OF RECURRENT MAJOR DEPRESSIVE DISORDER (H): ICD-10-CM

## 2018-04-24 DIAGNOSIS — J30.1 CHRONIC SEASONAL ALLERGIC RHINITIS DUE TO POLLEN: ICD-10-CM

## 2018-04-24 DIAGNOSIS — Z79.4 TYPE 2 DIABETES MELLITUS WITH COMPLICATION, WITH LONG-TERM CURRENT USE OF INSULIN (H): Primary | ICD-10-CM

## 2018-04-24 DIAGNOSIS — L98.9 SKIN PROBLEM: ICD-10-CM

## 2018-04-24 DIAGNOSIS — R41.89 COGNITIVE IMPAIRMENT: ICD-10-CM

## 2018-04-24 DIAGNOSIS — N18.30 CKD (CHRONIC KIDNEY DISEASE) STAGE 3, GFR 30-59 ML/MIN (H): ICD-10-CM

## 2018-04-24 DIAGNOSIS — I50.32 CHRONIC DIASTOLIC CONGESTIVE HEART FAILURE (H): ICD-10-CM

## 2018-04-24 DIAGNOSIS — E11.8 TYPE 2 DIABETES MELLITUS WITH COMPLICATION, WITH LONG-TERM CURRENT USE OF INSULIN (H): Primary | ICD-10-CM

## 2018-04-24 DIAGNOSIS — I48.20 CHRONIC ATRIAL FIBRILLATION (H): ICD-10-CM

## 2018-04-24 DIAGNOSIS — I10 BENIGN ESSENTIAL HYPERTENSION: ICD-10-CM

## 2018-04-24 NOTE — LETTER
4/24/2018        RE: Diya Cruz  [facility]  [address] [facility] [address]        [place] [facility]    Chief Complaint   Patient presents with     RECHECK       HPI:    Diya Cruz is a 77 year old  (1940), who is being seen today for a routine visit to establish care at [facility].  HPI information obtained from: facility chart records, facility staff and patient report.    Today's concern is:     Type 2 diabetes mellitus with complication, with long-term current use of insulin (H)  Chronic diastolic congestive heart failure (H)  Chronic atrial fibrillation (H)  Skin problem  CKD (chronic kidney disease) stage 3, GFR 30-59 ml/min  Mild episode of recurrent major depressive disorder (H)  Lung nodule < 6cm on CT  Cognitive impairment  Benign essential hypertension  Chronic seasonal allergic rhinitis due to pollen       ALLERGIES: Ambien [zolpidem]; Azithromycin; Clindamycin; Codeine; Lisinopril; Nitrofurantoin; Rosiglitazone; Simvastatin; Sulfa drugs; and Triamterene-hctz [hydrochlorothiazide w/triamterene]  Past Medical, Surgical, Family and Social History reviewed and updated in EPIC.    REVIEW OF SYSTEMS:  4 point ROS including Respiratory, CV, GI and , other than that noted in the HPI,  is negative.     Physical Exam:  /72  Pulse 64  Temp 96.7  F (35.9  C)  Resp 16  Wt 177 lb (80.3 kg)  BMI 29.45 kg/m2  GENERAL APPEARANCE:  Alert, in no distress  RESP:  respiratory effort and palpation of chest normal, no respiratory distress, lungs sounds fine crackles in left lung  CV:  Palpation and auscultation of heart done , regular rate and rhythm, no murmur, rub, or gallop, edema none  ABDOMEN:  normal bowel sounds, soft, nontender, no hepatosplenomegaly or other masses  M/S:  Gait and station walks independently, gait steady, no tenderness or swelling of the joints   SKIN:  Inspection and palpation of skin and subcutaneous tissue at  "baseline  PSYCH:  insight and judgement, memory forgetful, affect and mood normal     Recent Labs:  hgb 10.5    Assessment/Plan:  Type 2 diabetes mellitus with complication, with long-term current use of insulin (H)  No lows, due for A1c  - a1c, bmp next lab day    Chronic diastolic congestive heart failure (H)  Stable. Continue beta blocker and diuretic.  - no changes     Chronic atrial fibrillation (H)  Rate controled. On amiodarone, metoprolol. Anticoagulation with warfarin  - continue follow up as directed with cardiology Dr Davis   - warfarin dosed today based on INR    Benign essential hypertension  Above goal. Home care nurse reports several b/ps over goal  - Increase metoprolol  - recheck in 1 month    Skin problem  Rash ongoing since about November worse on the tops of her thighs, upper arms and hands.   - triamcinilone cream    CKD (chronic kidney disease) stage 3, GFR 30-59 ml/min  Creatinine   Date Value Ref Range Status   01/22/2018 1.20 (A) 0.60 - 1.10 mg/dL Final   ]  Check kidney function with next lab check.     Mild episode of recurrent major depressive disorder (H)  Stable on cymbalta    Lung nodule < 6cm on CT  Follow with pulmonologist has \"Stable 5 mm mixed attenuation left apical nodule\" Last CT scan in July of 2017.    Cognitive impairment  Resides in assisted living and requires help with higher level decision making. counseling providing on driving and recommended driving evaluation.   - continue supportive care    Chronic seasonal allergic rhinitis due to pollen  Complains of some runny nose, cough, itchy throat. Has clariton PRN on her list but has not been using.  - d/c Claritin and start fluticasone nasal spray    Orders:  1. Check BMP, A1C next lab day Dx: DM2  2. D.C Clotrimazole, Hydrocortisone, Loratadine  3. Fluticasone 50 mcg 1 spray each nostrile noce daily for allergies until Clare 10 then twice daily as needed for allergies  4. Increase metoprolol to 100 mg PO Twice dialy Dx: " HTN  5. CHnge novolog to 4 units SQ with breakfast and lunch and 6 units SQ with supper  6. INR 1.93. Continue current dose of warfarin and recheck INR in 2 weeks    Electronically signed by  Manda Molina NP         Sincerely,        Manda Molina NP

## 2018-04-24 NOTE — PROGRESS NOTES
[facility]    Chief Complaint   Patient presents with     RECHECK       HPI:    Diay Cruz is a 77 year old  (1940), who is being seen today for a routine visit to establish care at [facility].  HPI information obtained from: facility chart records, facility staff and patient report.    Today's concern is:     Type 2 diabetes mellitus with complication, with long-term current use of insulin (H)  Chronic diastolic congestive heart failure (H)  Chronic atrial fibrillation (H)  Skin problem  CKD (chronic kidney disease) stage 3, GFR 30-59 ml/min  Mild episode of recurrent major depressive disorder (H)  Lung nodule < 6cm on CT  Cognitive impairment  Benign essential hypertension  Chronic seasonal allergic rhinitis due to pollen       ALLERGIES: Ambien [zolpidem]; Azithromycin; Clindamycin; Codeine; Lisinopril; Nitrofurantoin; Rosiglitazone; Simvastatin; Sulfa drugs; and Triamterene-hctz [hydrochlorothiazide w/triamterene]  Past Medical, Surgical, Family and Social History reviewed and updated in EPIC.    REVIEW OF SYSTEMS:  4 point ROS including Respiratory, CV, GI and , other than that noted in the HPI,  is negative.     Physical Exam:  /72  Pulse 64  Temp 96.7  F (35.9  C)  Resp 16  Wt 177 lb (80.3 kg)  BMI 29.45 kg/m2  GENERAL APPEARANCE:  Alert, in no distress  RESP:  respiratory effort and palpation of chest normal, no respiratory distress, lungs sounds fine crackles in left lung  CV:  Palpation and auscultation of heart done , regular rate and rhythm, no murmur, rub, or gallop, edema none  ABDOMEN:  normal bowel sounds, soft, nontender, no hepatosplenomegaly or other masses  M/S:  Gait and station walks independently, gait steady, no tenderness or swelling of the joints   SKIN:  Inspection and palpation of skin and subcutaneous tissue at baseline  PSYCH:  insight and judgement, memory forgetful, affect and mood normal     Recent Labs:  hgb  "10.5    Assessment/Plan:  Type 2 diabetes mellitus with complication, with long-term current use of insulin (H)  No lows, due for A1c  - a1c, bmp next lab day    Chronic diastolic congestive heart failure (H)  Stable. Continue beta blocker and diuretic.  - no changes     Chronic atrial fibrillation (H)  Rate controled. On amiodarone, metoprolol. Anticoagulation with warfarin  - continue follow up as directed with cardiology Dr Davis   - warfarin dosed today based on INR    Benign essential hypertension  Above goal. Home care nurse reports several b/ps over goal  - Increase metoprolol  - recheck in 1 month    Skin problem  Rash ongoing since about November worse on the tops of her thighs, upper arms and hands.   - triamcinilone cream    CKD (chronic kidney disease) stage 3, GFR 30-59 ml/min  Creatinine   Date Value Ref Range Status   01/22/2018 1.20 (A) 0.60 - 1.10 mg/dL Final   ]  Check kidney function with next lab check.     Mild episode of recurrent major depressive disorder (H)  Stable on cymbalta    Lung nodule < 6cm on CT  Follow with pulmonologist has \"Stable 5 mm mixed attenuation left apical nodule\" Last CT scan in July of 2017.    Cognitive impairment  Resides in assisted living and requires help with higher level decision making. counseling providing on driving and recommended driving evaluation.   - continue supportive care    Chronic seasonal allergic rhinitis due to pollen  Complains of some runny nose, cough, itchy throat. Has clariton PRN on her list but has not been using.  - d/c Claritin and start fluticasone nasal spray    Orders:  1. Check BMP, A1C next lab day Dx: DM2  2. D.C Clotrimazole, Hydrocortisone, Loratadine  3. Fluticasone 50 mcg 1 spray each nostrile noce daily for allergies until Clare 10 then twice daily as needed for allergies  4. Increase metoprolol to 100 mg PO Twice dialy Dx: HTN  5. CHnge novolog to 4 units SQ with breakfast and lunch and 6 units SQ with supper  6. INR 1.93. " Continue current dose of warfarin and recheck INR in 2 weeks    Electronically signed by  Manda Molina NP

## 2018-04-25 VITALS
HEART RATE: 64 BPM | DIASTOLIC BLOOD PRESSURE: 72 MMHG | TEMPERATURE: 96.7 F | WEIGHT: 177 LBS | BODY MASS INDEX: 29.45 KG/M2 | RESPIRATION RATE: 16 BRPM | SYSTOLIC BLOOD PRESSURE: 150 MMHG

## 2018-04-27 ENCOUNTER — RECORDS - HEALTHEAST (OUTPATIENT)
Dept: LAB | Facility: CLINIC | Age: 78
End: 2018-04-27

## 2018-04-30 ENCOUNTER — TRANSFERRED RECORDS (OUTPATIENT)
Dept: HEALTH INFORMATION MANAGEMENT | Facility: CLINIC | Age: 78
End: 2018-04-30

## 2018-04-30 LAB
ANION GAP SERPL CALCULATED.3IONS-SCNC: 8 MMOL/L (ref 5–18)
ANION GAP SERPL CALCULATED.3IONS-SCNC: 8 MMOL/L (ref 5–18)
BUN SERPL-MCNC: 20 MG/DL (ref 8–28)
BUN SERPL-MCNC: 20 MG/DL (ref 8–28)
CALCIUM SERPL-MCNC: 8.9 MG/DL (ref 8.5–10.5)
CALCIUM SERPL-MCNC: 8.9 MG/DL (ref 8.5–10.5)
CHLORIDE BLD-SCNC: 104 MMOL/L (ref 98–107)
CHLORIDE SERPLBLD-SCNC: 104 MMOL/L (ref 93–107)
CO2 SERPL-SCNC: 29 MMOL/L (ref 22–31)
CO2 SERPL-SCNC: 29 MMOL/L (ref 22–31)
CREAT SERPL-MCNC: 1.1 MG/DL (ref 0.5–1.1)
CREAT SERPL-MCNC: 1.1 MG/DL (ref 0.6–1.1)
GFR SERPL CREATININE-BSD FRML MDRD: 48 ML/MIN/1.73M2
GFR SERPL CREATININE-BSD FRML MDRD: 48 ML/MIN/1.73M2
GLUCOSE BLD-MCNC: 101 MG/DL (ref 70–125)
GLUCOSE SERPL-MCNC: 101 MG/DL (ref 70–125)
HBA1C MFR BLD: 10 % (ref 4.2–6.1)
HBA1C MFR BLD: 10 % (ref 4.2–6.1)
POTASSIUM BLD-SCNC: 3.5 MMOL/L (ref 3.5–5)
POTASSIUM SERPL-SCNC: 3.5 MMOL/L (ref 3.5–5)
SODIUM SERPL-SCNC: 141 MMOL/L (ref 136–145)
SODIUM SERPL-SCNC: 141 MMOL/L (ref 136–145)

## 2018-05-14 ENCOUNTER — TRANSFERRED RECORDS (OUTPATIENT)
Dept: HEALTH INFORMATION MANAGEMENT | Facility: CLINIC | Age: 78
End: 2018-05-14

## 2018-05-14 ENCOUNTER — RECORDS - HEALTHEAST (OUTPATIENT)
Dept: LAB | Facility: CLINIC | Age: 78
End: 2018-05-14

## 2018-05-14 LAB
HEMOGLOBIN: 9.7 G/DL (ref 12–16)
HGB BLD-MCNC: 9.7 G/DL (ref 12–16)

## 2018-05-14 NOTE — PROGRESS NOTES
[facility]    Chief Complaint   Patient presents with     RECHECK       HPI:    Diya Cruz is a 77 year old  (1940), who is being seen today for a routine visit to establish care at [facility].  HPI information obtained from: facility chart records, facility staff and patient report.    Today's concern is:     Type 2 diabetes mellitus with complication, with long-term current use of insulin (H)  Chronic diastolic congestive heart failure (H)  Chronic atrial fibrillation (H)  Benign essential hypertension  Iron deficiency anemia, unspecified iron deficiency anemia type  Cognitive impairment       ALLERGIES: Ambien [zolpidem]; Azithromycin; Clindamycin; Codeine; Lisinopril; Nitrofurantoin; Rosiglitazone; Simvastatin; Sulfa drugs; and Triamterene-hctz [hydrochlorothiazide w/triamterene]  Past Medical, Surgical, Family and Social History reviewed and updated in EPIC.    REVIEW OF SYSTEMS:  4 point ROS including Respiratory, CV, GI and , other than that noted in the HPI,  is negative.     Physical Exam:  Wt 190 lb (86.2 kg)  BMI 31.62 kg/m2  GENERAL APPEARANCE:  Alert, in no distress  RESP:  respiratory effort and palpation of chest normal, no respiratory distress, lungs sounds fine crackles in left lung  CV:  Palpation and auscultation of heart done , regular rate and rhythm, no murmur, rub, or gallop, edema none  ABDOMEN:  normal bowel sounds, soft, nontender, no hepatosplenomegaly or other masses  M/S:  Gait and station walks independently, gait steady, no tenderness or swelling of the joints   SKIN:  Inspection and palpation of skin and subcutaneous tissue at baseline  PSYCH:  insight and judgement, memory forgetful, affect and mood normal     Recent Labs:  hgb 10.5    Assessment/Plan:  Type 2 diabetes mellitus with complication, with long-term current use of insulin (H)  Blood sugars reviewed. Does not typically eat much for breakfast and is having lower blood sugars  "at lunch and higher blood sugars at HS.  Last A1c was ~ 10. Labs not scanned into epic.     Chronic diastolic congestive heart failure (H)  Patient complains of increase in fatigue, and weight. Denies worsening of shortness of breath. Continue beta blocker and diuretic.  - no changes     Chronic atrial fibrillation (H)  Rate controled. On amiodarone, metoprolol. Anticoagulation with warfarin  - continue follow up as directed with cardiology Dr Davis   - warfarin dosed today based on INR    Benign essential hypertension  No b/p available for review today.  - Increase metoprolol  - recheck in 1 month    Mild episode of recurrent major depressive disorder (H)  Stable on cymbalta    Lung nodule < 6cm on CT  Follow with pulmonologist has \"Stable 5 mm mixed attenuation left apical nodule\" Last CT scan in July of 2017.    Cognitive impairment  Resides in assisted living and requires help with higher level decision making. counseling providing on driving and recommended driving evaluation.   - continue supportive care    Iron deficiency anemia, unspecified iron deficiency anemia type  Complains of fatigue lately. hgb 9.7 which is down slightly. But has chronic anemia.  - add iron every other day.      Orders:  1. Ferrous Sulfate 325 mg PO every other day Dx: Iron Def Animia  2. additional 40 mg Furosamide PO daily x 2 days  3. Change novolog to 8 units SubQ with lunch and supper. D/C Am dose of novolog  4. Update NP in one week with Blood sugars    Electronically signed by  Manda Molina NP     "

## 2018-05-15 ENCOUNTER — ASSISTED LIVING VISIT (OUTPATIENT)
Dept: GERIATRICS | Facility: CLINIC | Age: 78
End: 2018-05-15
Payer: MEDICARE

## 2018-05-15 VITALS — WEIGHT: 190 LBS | BODY MASS INDEX: 31.62 KG/M2

## 2018-05-15 DIAGNOSIS — I50.32 CHRONIC DIASTOLIC CONGESTIVE HEART FAILURE (H): ICD-10-CM

## 2018-05-15 DIAGNOSIS — E11.8 TYPE 2 DIABETES MELLITUS WITH COMPLICATION, WITH LONG-TERM CURRENT USE OF INSULIN (H): Primary | ICD-10-CM

## 2018-05-15 DIAGNOSIS — Z79.4 TYPE 2 DIABETES MELLITUS WITH COMPLICATION, WITH LONG-TERM CURRENT USE OF INSULIN (H): Primary | ICD-10-CM

## 2018-05-15 DIAGNOSIS — I10 BENIGN ESSENTIAL HYPERTENSION: ICD-10-CM

## 2018-05-15 DIAGNOSIS — R41.89 COGNITIVE IMPAIRMENT: ICD-10-CM

## 2018-05-15 DIAGNOSIS — D50.9 IRON DEFICIENCY ANEMIA, UNSPECIFIED IRON DEFICIENCY ANEMIA TYPE: ICD-10-CM

## 2018-05-15 DIAGNOSIS — I48.20 CHRONIC ATRIAL FIBRILLATION (H): ICD-10-CM

## 2018-05-15 RX ORDER — FERROUS SULFATE 325(65) MG
325 TABLET ORAL DAILY
COMMUNITY

## 2018-05-31 ENCOUNTER — TRANSFERRED RECORDS (OUTPATIENT)
Dept: HEALTH INFORMATION MANAGEMENT | Facility: CLINIC | Age: 78
End: 2018-05-31

## 2018-06-14 ENCOUNTER — TELEPHONE (OUTPATIENT)
Dept: GERIATRICS | Facility: CLINIC | Age: 78
End: 2018-06-14

## 2018-06-14 NOTE — TELEPHONE ENCOUNTER
[facility] TELEPHONE ENCOUNTER    Chief Complaint   Patient presents with     INR RESULTS       Diya Cruz is a 78 year old  (1940),Nurse called today to report: INR today 1.8 stable on current dose Coumadin 1.5 mg Monday and Thursday then 3 mg ROW    ASSESSMENT/PLAN  Therapeutic INR    Continue same dose    Recheck INR in 4 weeks    MABEL Coelho CNP

## 2018-07-23 NOTE — PROGRESS NOTES
[facility]  Chief Complaint   Patient presents with     Annual Comprehensive Nursing Home       [facility] Medical Record Number:  [id number]    HPI:    Diya Cruz is a 78 year old  (1940), who is being seen today for an annual comprehensive visit at [facility] .  HPI information obtained from: facility chart records, facility staff, patient report and [facility] chart review.      Today's concerns are:     Type 2 diabetes mellitus with stage 3 chronic kidney disease, with long-term current use of insulin (H)  CKD (chronic kidney disease) stage 3, GFR 30-59 ml/min  Chronic diastolic congestive heart failure (H)  Chronic atrial fibrillation (H)  Benign essential hypertension  Mild episode of recurrent major depressive disorder (H)  Lung nodule < 6cm on CT  Cognitive impairment  Iron deficiency anemia, unspecified iron deficiency anemia type      ALLERGIES: Rosiglitazone; Ambien; Ambien [zolpidem]; Sulfa drugs; Azithromycin; Clindamycin; Codeine; Lisinopril; Nitrofurantoin; Simvastatin; and Triamterene-hctz [hydrochlorothiazide w/triamterene]  PROBLEM LIST:  Patient Active Problem List   Diagnosis     Lung nodule < 6cm on CT     Anxiety     Stenosis of carotid artery     Candidal intertrigo     Chronic back pain     Fatigue     Glaucoma     History of adenomatous polyp of colon     History of endovascular stent graft for abdominal aortic aneurysm     Mass of breast     Hypoferremia     Spinal stenosis of lumbar region     Microalbuminuria     Mild episode of recurrent major depressive disorder (H)     Multiple-type hyperlipidemia     Obstructive sleep apnea syndrome     Peripheral arterial occlusive disease (H)     Nondiabetic proliferative retinopathy     Rheumatoid arthritis (H)     Seasonal allergic rhinitis     Seizure disorder (H)     Type 2 diabetes mellitus (H)     Sick sinus syndrome (H)     Elevated LFTs     Physical deconditioning     Chronic atrial fibrillation  (H)     Benign essential hypertension     Chronic diastolic congestive heart failure (H)     CKD (chronic kidney disease) stage 3, GFR 30-59 ml/min     Anemia, unspecified type     Cognitive impairment     Hx of seizure disorder     Closed supracondylar fracture of left humerus with routine healing, subsequent encounter     Advanced directives, counseling/discussion     Falls frequently     Skin problem     Pulmonary nodules     PAST MEDICAL HISTORY:  has a past medical history of Abdominal aortic stenosis; Anxiety; Basal cell carcinoma; Carotid stenosis; Cerebrovascular accident (CVA) (H) (8/19/2013); Claudication (H); Compression fracture; Depressive disorder; GI bleed; Glaucoma; Hyperlipidemia; Meniere syndrome; Obesity; Obstructive sleep apnea (adult) (pediatric); Peripheral vascular disease, unspecified (H); Pure hypercholesterolemia; Rheumatoid arthritis(714.0); Seizure disorder (H); Sjogren's disease (H); Type II or unspecified type diabetes mellitus without mention of complication, not stated as uncontrolled; Unspecified cerebral artery occlusion with cerebral infarction; Unspecified epilepsy without mention of intractable epilepsy; and Unspecified essential hypertension.  PAST SURGICAL HISTORY:  has a past surgical history that includes tonsillectomy; Hysterectomy; GYN surgery; Breast surgery; Head and neck surgery; Cardiac surgery; Cardiac surgery; Abdomen surgery; vascular surgery; Phacoemulsification with standard intraocular lens implant (6/26/2014); cataract iol, rt/lt (2010); and Laser YAG capsulotomy (Bilateral, 8/27/2015).  FAMILY HISTORY: family history is not on file.  SOCIAL HISTORY:  reports that she has quit smoking. Her smoking use included Cigarettes. She has a 30.00 pack-year smoking history. She does not have any smokeless tobacco history on file. She reports that she does not drink alcohol or use illicit drugs.  IMMUNIZATIONS:  Most Recent Immunizations   Administered Date(s) Administered      Influenza (H1N1) 12/15/2009     Influenza (High Dose) 3 valent vaccine 10/30/2015     Influenza (IIV3) PF 10/13/2016     Pneumo Conj 13-V (2010&after) 05/18/2015     Pneumococcal 23 valent 09/29/2010     Above immunizations pulled from [facility]. [facility] and facility records also reconciled. Outstanding information sent to  to update [facility].  Future immunizations needed:  TDAP  MEDICATIONS:  Current Outpatient Prescriptions   Medication Sig Dispense Refill     acetaminophen (TYLENOL) 325 MG tablet Take 650 mg by mouth every 6 hours as needed for pain        amiodarone (PACERONE/CODARONE) 200 MG tablet Take 1 tablet (200 mg) by mouth daily 30 tablet 0     AMLODIPINE BESYLATE PO Take 10 mg by mouth daily        clotrimazole (LOTRIMIN) 1 % cream Apply topically 2 times daily as needed       DULoxetine (CYMBALTA) 60 MG EC capsule Take 60 mg by mouth 2 times daily       ferrous sulfate (IRON) 325 (65 Fe) MG tablet Take 325 mg by mouth every other day       fluticasone (FLOVENT DISKUS) 50 MCG/BLIST AEPB Inhale 2 puffs into the lungs 2 times daily as needed       FOLIC ACID PO Take 1 mg by mouth daily       FUROSEMIDE PO Take 40 mg by mouth 2 times daily       GABAPENTIN PO Take 300 mg by mouth 3 times daily        hydrocortisone (CORTAID) 1 % cream Apply topically 2 times daily as needed       insulin aspart (NOVOLOG FLEXPEN) 100 UNIT/ML injection Inject 8 Units Subcutaneous 2 times daily (with meals) Give with lunch and dinner       insulin glargine (LANTUS) 100 UNIT/ML injection Inject 30 Units Subcutaneous At Bedtime        latanoprost (XALATAN) 0.005 % ophthalmic solution Place 1 drop into both eyes At Bedtime       levETIRAcetam (KEPPRA) 500 MG tablet Take 500 mg by mouth 2 times daily       MELATONIN PO Take 3 mg by mouth At Bedtime        METOPROLOL TARTRATE PO Take 100 mg by mouth 2 times daily        mupirocin (BACTROBAN) 2 % ointment Apply topically as needed       nitroglycerin  (NITROSTAT) 0.4 MG sublingual tablet Place 0.4 mg under the tongue every 5 minutes as needed for chest pain For chest pain place 1 tablet under the tongue every 5 minutes for 3 doses. If symptoms persist 5 minutes after 1st dose call 911.       OMEPRAZOLE PO Take 20 mg by mouth daily        POTASSIUM CHLORIDE PO Take 40 mEq by mouth daily       PRAVASTATIN SODIUM PO Take 40 mg by mouth every evening       senna-docusate (SENOKOT-S;PERICOLACE) 8.6-50 MG per tablet Take 1 tablet by mouth 2 times daily as needed        timolol (TIMOPTIC) 0.5 % ophthalmic solution Place 1 drop into both eyes 2 times daily Hold if HR <60       TRAMADOL HCL PO Take 50 mg by mouth every 8 hours as needed for moderate to severe pain       triamcinolone (KENALOG) 0.1 % cream Apply topically 2 times daily       Warfarin Sodium (COUMADIN PO) Take as directed by INR current dose 5mg PO Mon and Thur. And 3mg PO Tues, Wed, Fri, Sat, Sun next INR 1/2/18       Case Management:  I have reviewed the Assisted Living care plan, current immunizations and preventive care/cancer screening..Future cancer screening is not clinically indicated secondary to age/goals of care Patient's desire to return to the community is not present. Current Level of Care is appropriate.    Advance Directive Discussion:    I reviewed the current advanced directives as reflected in EPIC, the POLST and the facility chart, and verified the congruency of orders. I contacted the first party dtr and left a message regarding the plan of Care.  I did review the advance directives with the resident.     Team Discussion:  I communicated with the appropriate disciplines involved with the Plan of Care:   Nursing      Patient Goal:  Patient's goal is pain control and comfort.    Information reviewed:  Medications, vital signs, orders, and nursing notes.    ROS:  4 point ROS including Respiratory, CV, GI and , other than that noted in the HPI,  is negative    Exam:  /82  Pulse 63   Temp 97.1  F (36.2  C)  Resp 12  Wt 183 lb (83 kg)  BMI 30.45 kg/m2  GENERAL APPEARANCE:  Alert, in no distress  RESP:  respiratory effort and palpation of chest normal, no respiratory distress, lungs sounds fine crackles in left lung  CV:  Palpation and auscultation of heart done , regular rate and rhythm, no murmur, rub, or gallop, edema none  ABDOMEN:  normal bowel sounds, soft, nontender, no hepatosplenomegaly or other masses  M/S:  Gait and station walks independently, gait steady, no tenderness or swelling of the joints   SKIN:  Inspection and palpation of skin and subcutaneous tissue at baseline  PSYCH:  insight and judgement, memory forgetful, affect and mood normal     Lab/Diagnostic data:     CBC RESULTS:   Recent Labs   Lab Test 05/14/18 08/16/17 01/03/17   WBC   --    --   7.7   RBC   --    --   3.45*   HGB  9.7*  9.2*  10.4*   HCT   --    --   31.6*   MCV   --    --   92   MCH   --    --   30   MCHC   --    --   33   RDW   --    --   14.9*   PLT   --    --   203       Last Basic Metabolic Panel:  Recent Labs   Lab Test 04/30/18 01/22/18   NA  141  139   POTASSIUM  3.5  4.0   CHLORIDE  104  104   DION  8.9  8.9   CO2  29  27   BUN  20  23   CR  1.10  1.20*   GLC  101  203*       Liver Function Studies -   Recent Labs   Lab Test 08/04/17   PROTTOTAL  6.1  6.1   ALBUMIN  3.1*  3.1*   BILITOTAL  0.4  0.4   ALKPHOS  146*  146*   AST  24  24   ALT  71*  71*       Lab Results   Component Value Date    A1C 10.0 04/30/2018       ASSESSMENT/PLAN:  Type 2 diabetes mellitus with complication, with long-term current use of insulin (H)  Blood sugars not available for review at this time. Will have nursing send them to me. Patient denies any low blood sugars.  Last A1c was ~ 10.  - check a1c     Chronic diastolic congestive heart failure (H)  Stable. Denies shortness of breath, orthopnea, or increase in fatigue  Wt Readings from Last 4 Encounters:   07/24/18 183 lb (83 kg)   05/15/18 190 lb (86.2 kg)  "  04/25/18 177 lb (80.3 kg)   03/06/18 182 lb (82.6 kg)   - no changes      Chronic atrial fibrillation (H)  Rate controled. On amiodarone, metoprolol. Anticoagulation with warfarin  - continue follow up as directed with cardiology Dr Davis   - warfarin dosed based on INR     Benign essential hypertension  BP Readings from Last 3 Encounters:   07/24/18 140/82   04/25/18 150/72   03/06/18 144/90   metoprolol increased in April. B/p ok and HR have been controled.      Mild episode of recurrent major depressive disorder (H)  Stable on cymbalta     Lung nodule < 6cm on CT  Follow with pulmonologist has \"Stable 5 mm mixed attenuation left apical nodule\" Last CT scan in July of 2017.     Cognitive impairment  Resides in assisted living and requires help with higher level decision making.  - continue supportive care     Iron deficiency anemia, unspecified iron deficiency anemia type  Last hgb 9.7 which is down slightly. has chronic anemia.  - continue Iron suplement.     CKD (chronic kidney disease) stage 3, GFR 30-59 ml/min  Stable.   Lab Results   Component Value Date    CR 1.10 04/30/2018      Keep renal function in mind with medication changes and illness, avoid nephrotoxic agents and monitor renal function every 6 - 12 months.      Orders:  1. Check A1C Dx: Dm2  2. Please send blood sugars to NP    Electronically signed by:  Manda Molina NP  "

## 2018-07-24 ENCOUNTER — ASSISTED LIVING VISIT (OUTPATIENT)
Dept: GERIATRICS | Facility: CLINIC | Age: 78
End: 2018-07-24
Payer: MEDICARE

## 2018-07-24 VITALS
DIASTOLIC BLOOD PRESSURE: 82 MMHG | BODY MASS INDEX: 30.45 KG/M2 | WEIGHT: 183 LBS | TEMPERATURE: 97.1 F | RESPIRATION RATE: 12 BRPM | SYSTOLIC BLOOD PRESSURE: 140 MMHG | HEART RATE: 63 BPM

## 2018-07-24 DIAGNOSIS — N18.30 CKD (CHRONIC KIDNEY DISEASE) STAGE 3, GFR 30-59 ML/MIN (H): ICD-10-CM

## 2018-07-24 DIAGNOSIS — I48.20 CHRONIC ATRIAL FIBRILLATION (H): ICD-10-CM

## 2018-07-24 DIAGNOSIS — R41.89 COGNITIVE IMPAIRMENT: ICD-10-CM

## 2018-07-24 DIAGNOSIS — D50.9 IRON DEFICIENCY ANEMIA, UNSPECIFIED IRON DEFICIENCY ANEMIA TYPE: ICD-10-CM

## 2018-07-24 DIAGNOSIS — Z79.4 TYPE 2 DIABETES MELLITUS WITH STAGE 3 CHRONIC KIDNEY DISEASE, WITH LONG-TERM CURRENT USE OF INSULIN (H): Primary | ICD-10-CM

## 2018-07-24 DIAGNOSIS — N18.30 TYPE 2 DIABETES MELLITUS WITH STAGE 3 CHRONIC KIDNEY DISEASE, WITH LONG-TERM CURRENT USE OF INSULIN (H): Primary | ICD-10-CM

## 2018-07-24 DIAGNOSIS — I50.32 CHRONIC DIASTOLIC CONGESTIVE HEART FAILURE (H): ICD-10-CM

## 2018-07-24 DIAGNOSIS — E11.22 TYPE 2 DIABETES MELLITUS WITH STAGE 3 CHRONIC KIDNEY DISEASE, WITH LONG-TERM CURRENT USE OF INSULIN (H): Primary | ICD-10-CM

## 2018-07-24 DIAGNOSIS — I10 BENIGN ESSENTIAL HYPERTENSION: ICD-10-CM

## 2018-07-24 DIAGNOSIS — F33.0 MILD EPISODE OF RECURRENT MAJOR DEPRESSIVE DISORDER (H): ICD-10-CM

## 2018-07-24 RX ORDER — TRIAMCINOLONE ACETONIDE 1 MG/G
CREAM TOPICAL 3 TIMES DAILY PRN
COMMUNITY

## 2018-07-24 NOTE — LETTER
7/24/2018        RE: Diya Cruz  [facility]  [address] [facility] [address]        [place]  Chief Complaint   Patient presents with     Annual Comprehensive Nursing Home       [place] Medical Record Number:  [id number]    HPI:    Diya Cruz is a 78 year old  (1940), who is being seen today for an annual comprehensive visit at [facility] .  HPI information obtained from: facility chart records, facility staff, patient report and [facility] chart review.      Today's concerns are:     Type 2 diabetes mellitus with stage 3 chronic kidney disease, with long-term current use of insulin (H)  CKD (chronic kidney disease) stage 3, GFR 30-59 ml/min  Chronic diastolic congestive heart failure (H)  Chronic atrial fibrillation (H)  Benign essential hypertension  Mild episode of recurrent major depressive disorder (H)  Lung nodule < 6cm on CT  Cognitive impairment  Iron deficiency anemia, unspecified iron deficiency anemia type      ALLERGIES: Rosiglitazone; Ambien; Ambien [zolpidem]; Sulfa drugs; Azithromycin; Clindamycin; Codeine; Lisinopril; Nitrofurantoin; Simvastatin; and Triamterene-hctz [hydrochlorothiazide w/triamterene]  PROBLEM LIST:  Patient Active Problem List   Diagnosis     Lung nodule < 6cm on CT     Anxiety     Stenosis of carotid artery     Candidal intertrigo     Chronic back pain     Fatigue     Glaucoma     History of adenomatous polyp of colon     History of endovascular stent graft for abdominal aortic aneurysm     Mass of breast     Hypoferremia     Spinal stenosis of lumbar region     Microalbuminuria     Mild episode of recurrent major depressive disorder (H)     Multiple-type hyperlipidemia     Obstructive sleep apnea syndrome     Peripheral arterial occlusive disease (H)     Nondiabetic proliferative retinopathy     Rheumatoid arthritis (H)     Seasonal allergic rhinitis     Seizure disorder (H)     Type 2 diabetes  mellitus (H)     Sick sinus syndrome (H)     Elevated LFTs     Physical deconditioning     Chronic atrial fibrillation (H)     Benign essential hypertension     Chronic diastolic congestive heart failure (H)     CKD (chronic kidney disease) stage 3, GFR 30-59 ml/min     Anemia, unspecified type     Cognitive impairment     Hx of seizure disorder     Closed supracondylar fracture of left humerus with routine healing, subsequent encounter     Advanced directives, counseling/discussion     Falls frequently     Skin problem     Pulmonary nodules     PAST MEDICAL HISTORY:  has a past medical history of Abdominal aortic stenosis; Anxiety; Basal cell carcinoma; Carotid stenosis; Cerebrovascular accident (CVA) (H) (8/19/2013); Claudication (H); Compression fracture; Depressive disorder; GI bleed; Glaucoma; Hyperlipidemia; Meniere syndrome; Obesity; Obstructive sleep apnea (adult) (pediatric); Peripheral vascular disease, unspecified (H); Pure hypercholesterolemia; Rheumatoid arthritis(714.0); Seizure disorder (H); Sjogren's disease (H); Type II or unspecified type diabetes mellitus without mention of complication, not stated as uncontrolled; Unspecified cerebral artery occlusion with cerebral infarction; Unspecified epilepsy without mention of intractable epilepsy; and Unspecified essential hypertension.  PAST SURGICAL HISTORY:  has a past surgical history that includes tonsillectomy; Hysterectomy; GYN surgery; Breast surgery; Head and neck surgery; Cardiac surgery; Cardiac surgery; Abdomen surgery; vascular surgery; Phacoemulsification with standard intraocular lens implant (6/26/2014); cataract iol, rt/lt (2010); and Laser YAG capsulotomy (Bilateral, 8/27/2015).  FAMILY HISTORY: family history is not on file.  SOCIAL HISTORY:  reports that she has quit smoking. Her smoking use included Cigarettes. She has a 30.00 pack-year smoking history. She does not have any smokeless tobacco history on file. She reports that she does  not drink alcohol or use illicit drugs.  IMMUNIZATIONS:  Most Recent Immunizations   Administered Date(s) Administered     Influenza (H1N1) 12/15/2009     Influenza (High Dose) 3 valent vaccine 10/30/2015     Influenza (IIV3) PF 10/13/2016     Pneumo Conj 13-V (2010&after) 05/18/2015     Pneumococcal 23 valent 09/29/2010     Above immunizations pulled from [facility]. [facility] and facility records also reconciled. Outstanding information sent to  to update [facility].  Future immunizations needed:  TDAP  MEDICATIONS:  Current Outpatient Prescriptions   Medication Sig Dispense Refill     acetaminophen (TYLENOL) 325 MG tablet Take 650 mg by mouth every 6 hours as needed for pain        amiodarone (PACERONE/CODARONE) 200 MG tablet Take 1 tablet (200 mg) by mouth daily 30 tablet 0     AMLODIPINE BESYLATE PO Take 10 mg by mouth daily        clotrimazole (LOTRIMIN) 1 % cream Apply topically 2 times daily as needed       DULoxetine (CYMBALTA) 60 MG EC capsule Take 60 mg by mouth 2 times daily       ferrous sulfate (IRON) 325 (65 Fe) MG tablet Take 325 mg by mouth every other day       fluticasone (FLOVENT DISKUS) 50 MCG/BLIST AEPB Inhale 2 puffs into the lungs 2 times daily as needed       FOLIC ACID PO Take 1 mg by mouth daily       FUROSEMIDE PO Take 40 mg by mouth 2 times daily       GABAPENTIN PO Take 300 mg by mouth 3 times daily        hydrocortisone (CORTAID) 1 % cream Apply topically 2 times daily as needed       insulin aspart (NOVOLOG FLEXPEN) 100 UNIT/ML injection Inject 8 Units Subcutaneous 2 times daily (with meals) Give with lunch and dinner       insulin glargine (LANTUS) 100 UNIT/ML injection Inject 30 Units Subcutaneous At Bedtime        latanoprost (XALATAN) 0.005 % ophthalmic solution Place 1 drop into both eyes At Bedtime       levETIRAcetam (KEPPRA) 500 MG tablet Take 500 mg by mouth 2 times daily       MELATONIN PO Take 3 mg by mouth At Bedtime        METOPROLOL TARTRATE PO Take 100  mg by mouth 2 times daily        mupirocin (BACTROBAN) 2 % ointment Apply topically as needed       nitroglycerin (NITROSTAT) 0.4 MG sublingual tablet Place 0.4 mg under the tongue every 5 minutes as needed for chest pain For chest pain place 1 tablet under the tongue every 5 minutes for 3 doses. If symptoms persist 5 minutes after 1st dose call 911.       OMEPRAZOLE PO Take 20 mg by mouth daily        POTASSIUM CHLORIDE PO Take 40 mEq by mouth daily       PRAVASTATIN SODIUM PO Take 40 mg by mouth every evening       senna-docusate (SENOKOT-S;PERICOLACE) 8.6-50 MG per tablet Take 1 tablet by mouth 2 times daily as needed        timolol (TIMOPTIC) 0.5 % ophthalmic solution Place 1 drop into both eyes 2 times daily Hold if HR <60       TRAMADOL HCL PO Take 50 mg by mouth every 8 hours as needed for moderate to severe pain       triamcinolone (KENALOG) 0.1 % cream Apply topically 2 times daily       Warfarin Sodium (COUMADIN PO) Take as directed by INR current dose 5mg PO Mon and Thur. And 3mg PO Tues, Wed, Fri, Sat, Sun next INR 1/2/18       Case Management:  I have reviewed the Assisted Living care plan, current immunizations and preventive care/cancer screening..Future cancer screening is not clinically indicated secondary to age/goals of care Patient's desire to return to the community is not present. Current Level of Care is appropriate.    Advance Directive Discussion:    I reviewed the current advanced directives as reflected in EPIC, the POLST and the facility chart, and verified the congruency of orders. I contacted the first party dtr and left a message regarding the plan of Care.  I did review the advance directives with the resident.     Team Discussion:  I communicated with the appropriate disciplines involved with the Plan of Care:   Nursing      Patient Goal:  Patient's goal is pain control and comfort.    Information reviewed:  Medications, vital signs, orders, and nursing notes.    ROS:  4 point ROS  including Respiratory, CV, GI and , other than that noted in the HPI,  is negative    Exam:  /82  Pulse 63  Temp 97.1  F (36.2  C)  Resp 12  Wt 183 lb (83 kg)  BMI 30.45 kg/m2  GENERAL APPEARANCE:  Alert, in no distress  RESP:  respiratory effort and palpation of chest normal, no respiratory distress, lungs sounds fine crackles in left lung  CV:  Palpation and auscultation of heart done , regular rate and rhythm, no murmur, rub, or gallop, edema none  ABDOMEN:  normal bowel sounds, soft, nontender, no hepatosplenomegaly or other masses  M/S:  Gait and station walks independently, gait steady, no tenderness or swelling of the joints   SKIN:  Inspection and palpation of skin and subcutaneous tissue at baseline  PSYCH:  insight and judgement, memory forgetful, affect and mood normal     Lab/Diagnostic data:     CBC RESULTS:   Recent Labs   Lab Test 05/14/18 08/16/17 01/03/17   WBC   --    --   7.7   RBC   --    --   3.45*   HGB  9.7*  9.2*  10.4*   HCT   --    --   31.6*   MCV   --    --   92   MCH   --    --   30   MCHC   --    --   33   RDW   --    --   14.9*   PLT   --    --   203       Last Basic Metabolic Panel:  Recent Labs   Lab Test 04/30/18 01/22/18   NA  141  139   POTASSIUM  3.5  4.0   CHLORIDE  104  104   DION  8.9  8.9   CO2  29  27   BUN  20  23   CR  1.10  1.20*   GLC  101  203*       Liver Function Studies -   Recent Labs   Lab Test 08/04/17   PROTTOTAL  6.1  6.1   ALBUMIN  3.1*  3.1*   BILITOTAL  0.4  0.4   ALKPHOS  146*  146*   AST  24  24   ALT  71*  71*       Lab Results   Component Value Date    A1C 10.0 04/30/2018       ASSESSMENT/PLAN:  Type 2 diabetes mellitus with complication, with long-term current use of insulin (H)  Blood sugars not available for review at this time. Will have nursing send them to me. Patient denies any low blood sugars.  Last A1c was ~ 10.  - check a1c     Chronic diastolic congestive heart failure (H)  Stable. Denies shortness of breath, orthopnea, or  "increase in fatigue  Wt Readings from Last 4 Encounters:   07/24/18 183 lb (83 kg)   05/15/18 190 lb (86.2 kg)   04/25/18 177 lb (80.3 kg)   03/06/18 182 lb (82.6 kg)   - no changes      Chronic atrial fibrillation (H)  Rate controled. On amiodarone, metoprolol. Anticoagulation with warfarin  - continue follow up as directed with cardiology Dr Davis   - warfarin dosed based on INR     Benign essential hypertension  BP Readings from Last 3 Encounters:   07/24/18 140/82   04/25/18 150/72   03/06/18 144/90   metoprolol increased in April. B/p ok and HR have been controled.      Mild episode of recurrent major depressive disorder (H)  Stable on cymbalta     Lung nodule < 6cm on CT  Follow with pulmonologist has \"Stable 5 mm mixed attenuation left apical nodule\" Last CT scan in July of 2017.     Cognitive impairment  Resides in assisted living and requires help with higher level decision making.  - continue supportive care     Iron deficiency anemia, unspecified iron deficiency anemia type  Last hgb 9.7 which is down slightly. has chronic anemia.  - continue Iron suplement.     CKD (chronic kidney disease) stage 3, GFR 30-59 ml/min  Stable.   Lab Results   Component Value Date    CR 1.10 04/30/2018      Keep renal function in mind with medication changes and illness, avoid nephrotoxic agents and monitor renal function every 6 - 12 months.      Orders:  1. Check A1C Dx: Dm2  2. Please send blood sugars to NP    Electronically signed by:  Manda Molina NP      Sincerely,        Manda Molina NP    "

## 2018-07-25 ENCOUNTER — RECORDS - HEALTHEAST (OUTPATIENT)
Dept: LAB | Facility: CLINIC | Age: 78
End: 2018-07-25

## 2018-07-25 ENCOUNTER — TRANSFERRED RECORDS (OUTPATIENT)
Dept: HEALTH INFORMATION MANAGEMENT | Facility: CLINIC | Age: 78
End: 2018-07-25

## 2018-07-25 LAB
HBA1C MFR BLD: 7.8 % (ref 4.2–6.1)
HBA1C MFR BLD: 7.8 % (ref 4.2–6.1)

## 2018-08-30 ENCOUNTER — ASSISTED LIVING VISIT (OUTPATIENT)
Dept: GERIATRICS | Facility: CLINIC | Age: 78
End: 2018-08-30
Payer: MEDICARE

## 2018-08-30 DIAGNOSIS — E87.6 HYPOKALEMIA: ICD-10-CM

## 2018-08-30 DIAGNOSIS — Z79.4 TYPE 2 DIABETES MELLITUS WITH STAGE 3 CHRONIC KIDNEY DISEASE, WITH LONG-TERM CURRENT USE OF INSULIN (H): ICD-10-CM

## 2018-08-30 DIAGNOSIS — N18.30 TYPE 2 DIABETES MELLITUS WITH STAGE 3 CHRONIC KIDNEY DISEASE, WITH LONG-TERM CURRENT USE OF INSULIN (H): ICD-10-CM

## 2018-08-30 DIAGNOSIS — I48.20 CHRONIC ATRIAL FIBRILLATION (H): ICD-10-CM

## 2018-08-30 DIAGNOSIS — I50.32 CHRONIC DIASTOLIC CONGESTIVE HEART FAILURE (H): Primary | ICD-10-CM

## 2018-08-30 DIAGNOSIS — N18.30 CKD (CHRONIC KIDNEY DISEASE) STAGE 3, GFR 30-59 ML/MIN (H): ICD-10-CM

## 2018-08-30 DIAGNOSIS — E11.22 TYPE 2 DIABETES MELLITUS WITH STAGE 3 CHRONIC KIDNEY DISEASE, WITH LONG-TERM CURRENT USE OF INSULIN (H): ICD-10-CM

## 2018-08-30 DIAGNOSIS — Z99.81 ON SUPPLEMENTAL OXYGEN THERAPY: ICD-10-CM

## 2018-08-30 NOTE — PROGRESS NOTES
[facility]  PRIMARY CARE PROVIDER AND CLINIC:  Manda Molina [address] / [place] [address]  Chief Complaint   Patient presents with     Hospital F/U     [place] Medical Record Number:  [id number]    HPI:    Diya Cruz is a 78 year old  (1940),admitted to the [facility]  from [facility].  Hospital stay 8/22/18 through 8/27/18.  Admitted to this facility for  rehab, medical management and nursing care.  HPI information obtained from: facility chart records, facility staff, patient report, [facility] chart review and Care Everywhere [facility] chart review.  Current issues are:         Chronic diastolic congestive heart failure (H)  Type 2 diabetes mellitus with stage 3 chronic kidney disease, with long-term current use of insulin (H)  CKD (chronic kidney disease) stage 3, GFR 30-59 ml/min  On supplemental oxygen therapy  Chronic atrial fibrillation (H)  Hypokalemia     BRIEF HOSPITAL SUMMARY:  This is a 78-year-old female coming in with a history of diastolic heart failure, COPD, diabetes, atrial fibrillation on Coumadin, rheumatoid arthritis with more shortness of breath. Patient has had more shortness of breath over the last week. Spent more exertional and she has orthopnea. Patient does weight herself every day and per the daughters is up 11 pounds. She has no chest pain. She has had similar symptoms in the past and has not noticed any further edema. She has noticed that her abdominal girth has increased. She does live in assisted living and gets her food and medications taking care of there. She admits to not watching her salt intake nor her fluid. She has been more wheezy and is not on inhalers at home. In the emergency room her chest x-rays that she cleared her BNP is over 800. Her saturations were down to 86% on room air. Patient was placed on oxygen is up to 96%.        CODE STATUS/ADVANCE DIRECTIVES DISCUSSION:    DNR / DNI  Patient's living condition: lives in an assisted living facility    ALLERGIES:Rosiglitazone; Ambien; Ambien [zolpidem]; Sulfa drugs; Azithromycin; Clindamycin; Codeine; Lisinopril; Nitrofurantoin; Simvastatin; and Triamterene-hctz [hydrochlorothiazide w/triamterene]  PAST MEDICAL HISTORY:  has a past medical history of Abdominal aortic stenosis; Anxiety; Basal cell carcinoma; Carotid stenosis; Cerebrovascular accident (CVA) (H) (8/19/2013); Claudication (H); Compression fracture; Depressive disorder; GI bleed; Glaucoma; Hyperlipidemia; Meniere syndrome; Obesity; Obstructive sleep apnea (adult) (pediatric); Peripheral vascular disease, unspecified (H); Pure hypercholesterolemia; Rheumatoid arthritis(714.0); Seizure disorder (H); Sjogren's disease (H); Type II or unspecified type diabetes mellitus without mention of complication, not stated as uncontrolled; Unspecified cerebral artery occlusion with cerebral infarction; Unspecified epilepsy without mention of intractable epilepsy; and Unspecified essential hypertension.  PAST SURGICAL HISTORY:  has a past surgical history that includes tonsillectomy; Hysterectomy; GYN surgery; Breast surgery; Head and neck surgery; Cardiac surgery; Cardiac surgery; Abdomen surgery; vascular surgery; Phacoemulsification with standard intraocular lens implant (6/26/2014); cataract iol, rt/lt (2010); and Laser YAG capsulotomy (Bilateral, 8/27/2015).  FAMILY HISTORY: family history is not on file.  SOCIAL HISTORY:  reports that she has quit smoking. Her smoking use included Cigarettes. She has a 30.00 pack-year smoking history. She does not have any smokeless tobacco history on file. She reports that she does not drink alcohol or use illicit drugs.    Post Discharge Medication Reconciliation Status: discharge medications reconciled and changed, per note/orders (see AVS).  Current Outpatient Prescriptions   Medication Sig Dispense Refill     acetaminophen (TYLENOL) 325 MG tablet  Take 650 mg by mouth every 6 hours as needed for pain        amiodarone (PACERONE/CODARONE) 200 MG tablet Take 1 tablet (200 mg) by mouth daily 30 tablet 0     AMLODIPINE BESYLATE PO Take 10 mg by mouth daily        clotrimazole (LOTRIMIN) 1 % cream Apply topically 2 times daily as needed       DULoxetine (CYMBALTA) 60 MG EC capsule Take 60 mg by mouth 2 times daily       ferrous sulfate (IRON) 325 (65 Fe) MG tablet Take 325 mg by mouth every other day       fluticasone (FLOVENT DISKUS) 50 MCG/BLIST AEPB Inhale 2 puffs into the lungs 2 times daily as needed       FOLIC ACID PO Take 1 mg by mouth daily       FUROSEMIDE PO Take 40 mg by mouth 2 times daily       GABAPENTIN PO Take 300 mg by mouth 3 times daily        hydrocortisone (CORTAID) 1 % cream Apply topically 2 times daily as needed       insulin aspart (NOVOLOG FLEXPEN) 100 UNIT/ML injection Inject 8 Units Subcutaneous 2 times daily (with meals) Give with lunch and dinner       insulin glargine (LANTUS) 100 UNIT/ML injection Inject 30 Units Subcutaneous At Bedtime        latanoprost (XALATAN) 0.005 % ophthalmic solution Place 1 drop into both eyes At Bedtime       levETIRAcetam (KEPPRA) 500 MG tablet Take 500 mg by mouth 2 times daily       MELATONIN PO Take 3 mg by mouth At Bedtime        METOPROLOL TARTRATE PO Take 100 mg by mouth 2 times daily        mupirocin (BACTROBAN) 2 % ointment Apply topically as needed       nitroglycerin (NITROSTAT) 0.4 MG sublingual tablet Place 0.4 mg under the tongue every 5 minutes as needed for chest pain For chest pain place 1 tablet under the tongue every 5 minutes for 3 doses. If symptoms persist 5 minutes after 1st dose call 911.       OMEPRAZOLE PO Take 20 mg by mouth daily        POTASSIUM CHLORIDE PO Take 40 mEq by mouth daily       PRAVASTATIN SODIUM PO Take 40 mg by mouth every evening       senna-docusate (SENOKOT-S;PERICOLACE) 8.6-50 MG per tablet Take 1 tablet by mouth 2 times daily as needed        timolol  (TIMOPTIC) 0.5 % ophthalmic solution Place 1 drop into both eyes 2 times daily Hold if HR <60       TRAMADOL HCL PO Take 50 mg by mouth every 8 hours as needed for moderate to severe pain       triamcinolone (KENALOG) 0.1 % cream Apply topically 2 times daily       Warfarin Sodium (COUMADIN PO) Take as directed by INR current dose 5mg PO Mon and Thur. And 3mg PO Tues, Wed, Fri, Sat, Sun next INR 1/2/18         ROS:  10 point ROS of systems including Constitutional, Eyes, Respiratory, Cardiovascular, Gastroenterology, Genitourinary, Integumentary, Muscularskeletal, Psychiatric were all negative except for pertinent positives noted in my HPI.    Exam:  /76  Pulse 76  Resp 16  Wt 176 lb 3.2 oz (79.9 kg)  BMI 29.32 kg/m2  GENERAL APPEARANCE:  Alert, in no distress  RESP:  respiratory effort and palpation of chest normal, no respiratory distress, lungs sounds fine crackles in left lung  CV:  Palpation and auscultation of heart done , regular rate and rhythm, no murmur, rub, or gallop, edema none  ABDOMEN:  normal bowel sounds, soft, nontender, no hepatosplenomegaly or other masses  M/S:  Gait and station walks independently, gait steady, no tenderness or swelling of the joints   SKIN:  Inspection and palpation of skin and subcutaneous tissue at baseline  PSYCH:  insight and judgement, memory forgetful, affect and mood normal     Lab/Diagnostic data:    Component Value Ref Range Performed At   POTASSIUM 4.0 3.5 - 5.1 mmol/L [facility]     Basic Metabolic Profile Magnesium  Only the most recent of 6 results within the time period is included.    Basic Metabolic Profile Magnesium   Component Value Ref Range Performed At   SODIUM 137 136 - 145 mmol/L [facility]   POTASSIUM 2.9 (LL)Comment: Critical Result(s) Called at 06:47:08 08/27/2018 to and read back by Nato on 7W by NCK 3.5 - 5.1 mmol/L [facility]   CHLORIDE 95 (L) 98 - 112 mmol/L [facility]   CARBON DIOXIDE 34 (H) 21 - 32 mmol/L [facility]   BUN (UREA NITRO) 25 (H) 7 - 24 mg/dL [facility]   CREATININE 1.34 (H) 0.55 - 1.02 mg/dL [facility]   EST GFR (MDRD) 38 (L) >60 mL/min [facility]   EST GFR IF  AM 47 (L) >60 mL/min [facility]   GLUCOSE 88 74 - 106 mg/dL [facility]   CALCIUM, SERUM 8.9 8.5 - 10.1 mg/dL [facility]   ANION GAP 8.0 0.0 - 15.0 mmol/L [facility]   Magnesium 2.2 1.8 - 2.4 mg/dL [facility]       BNP - Btype NA Peptide  Only the most recent of 2 results within the time period is included.    BNP - Btype NA Peptide   Component Value Ref Range Performed At   BNPBTE NA PEPTIDE 489 (H) <=100 pg/mL [facility]     Iron Status with Ferritin  Iron Status with Ferritin   Component Value Ref Range Performed At   TIBC 318 250 - 450 ug/dL [facility]   IRON SATURATION 11 (L) 15 - 50 % [facility]   FERRITIN, SERUM 62 8 - 388 ng/mL [facility]   IRON 34 (L) 40 - 170 ug/dL [facility]   TRANSFERRIN, SERUM 245 200 - 360 mg/dL [facility]     CBC  CBC   Component Value Ref Range Performed At   WBC 6.0 4.3 - 10.8 K/uL [facility]   RBC 4.21 4.20 - 5.40 M/uL [facility]   HEMOGLOBIN 11.1 (L) 12.0 - 16.0 gm/dL [facility]   HEMATOCRIT 36.9 36.0 - 48.0 % [facility]   MCV 88 80 - 100 fl [facility]   MCH 26 (L) 27 - 33 pg [facility]   MCHC 30 (L) 33 - 36 gm/dL [facility]   RDW 19.1 (H) 11.5 - 14.5 % [facility]   PLATELET COUNT 131 (L) 150 - 400 K/uL [facility]   MPV  9.9 6.5 - 12.0 [facility]            ASSESSMENT/PLAN:  Chronic diastolic congestive heart failure (H)  Admitted to [facility] for CHF exacerbation.  Diuresed 11 pounds.  Patient continues to have orders to weigh daily and update provider for increase in weight.  Lasix was changed to torsemide, continues on metoprolol.  -Follow up with cardiology in 2 weeks    Type 2 diabetes mellitus with stage 3 chronic kidney disease, with long-term current use of insulin (H)  Lantus decreased.  Blood sugars have been controlled since return to assisted living.    -continue current dosing Recheck in 1 month.    CKD (chronic kidney disease) stage 3, GFR 30-59 ml/min  Stable.   Lab Results   Component Value Date    CR 1.10 04/30/2018      Keep renal function in mind with medication changes and illness, avoid nephrotoxic agents and monitor renal function every 6 - 12 months.      On supplemental oxygen therapy  She was admitted back to her assisted living on supplemental oxygen.  She currently does not have it in place.  Oxygen level not taken but will have home care for continued monitoring.  -Continue oxygen  -Home care to monitor oxygen sats to assess continued need    Chronic atrial fibrillation (H)   rate controlled with metoprolol, continues on warfarin for clot prevention.  Warfarin dose based on INR    Hypokalemia  Due to torsemide. on replacement protocol at hospital.  Discharged to home with 30 mEq daily of potassium.  -Check BMP    Orders:  1. Check bmp on wednesday    Electronically signed by:  Manda Molina NP

## 2018-08-30 NOTE — LETTER
8/30/2018        RE: Diya Cruz  [facility]  [address]  [place] [address]        [facility]  PRIMARY CARE PROVIDER AND CLINIC:  Manda Molina [address] [facility] [address] / [place] [address]  Chief Complaint   Patient presents with     Hospital F/U     [facility] Medical Record Number:  [id number]    HPI:    Diya Cruz is a 78 year old  (1940),admitted to the [facility] by the [facility]  from [facility].  Hospital stay 8/22/18 through 8/27/18.  Admitted to this facility for  rehab, medical management and nursing care.  HPI information obtained from: facility chart records, facility staff, patient report, [facility] chart review and Care Everywhere [facility] chart review.  Current issues are:         Chronic diastolic congestive heart failure (H)  Type 2 diabetes mellitus with stage 3 chronic kidney disease, with long-term current use of insulin (H)  CKD (chronic kidney disease) stage 3, GFR 30-59 ml/min  On supplemental oxygen therapy  Chronic atrial fibrillation (H)  Hypokalemia     BRIEF HOSPITAL SUMMARY:  This is a 78-year-old female coming in with a history of diastolic heart failure, COPD, diabetes, atrial fibrillation on Coumadin, rheumatoid arthritis with more shortness of breath. Patient has had more shortness of breath over the last week. Spent more exertional and she has orthopnea. Patient does weight herself every day and per the daughters is up 11 pounds. She has no chest pain. She has had similar symptoms in the past and has not noticed any further edema. She has noticed that her abdominal girth has increased. She does live in assisted living and gets her food and medications taking care of there. She admits to not watching her salt intake nor her fluid. She has been more wheezy and is not on inhalers at home. In the emergency room her chest x-rays that she cleared her BNP is over 800. Her saturations were down to  86% on room air. Patient was placed on oxygen is up to 96%.        CODE STATUS/ADVANCE DIRECTIVES DISCUSSION:   DNR / DNI  Patient's living condition: lives in an assisted living facility    ALLERGIES:Rosiglitazone; Ambien; Ambien [zolpidem]; Sulfa drugs; Azithromycin; Clindamycin; Codeine; Lisinopril; Nitrofurantoin; Simvastatin; and Triamterene-hctz [hydrochlorothiazide w/triamterene]  PAST MEDICAL HISTORY:  has a past medical history of Abdominal aortic stenosis; Anxiety; Basal cell carcinoma; Carotid stenosis; Cerebrovascular accident (CVA) (H) (8/19/2013); Claudication (H); Compression fracture; Depressive disorder; GI bleed; Glaucoma; Hyperlipidemia; Meniere syndrome; Obesity; Obstructive sleep apnea (adult) (pediatric); Peripheral vascular disease, unspecified (H); Pure hypercholesterolemia; Rheumatoid arthritis(714.0); Seizure disorder (H); Sjogren's disease (H); Type II or unspecified type diabetes mellitus without mention of complication, not stated as uncontrolled; Unspecified cerebral artery occlusion with cerebral infarction; Unspecified epilepsy without mention of intractable epilepsy; and Unspecified essential hypertension.  PAST SURGICAL HISTORY:  has a past surgical history that includes tonsillectomy; Hysterectomy; GYN surgery; Breast surgery; Head and neck surgery; Cardiac surgery; Cardiac surgery; Abdomen surgery; vascular surgery; Phacoemulsification with standard intraocular lens implant (6/26/2014); cataract iol, rt/lt (2010); and Laser YAG capsulotomy (Bilateral, 8/27/2015).  FAMILY HISTORY: family history is not on file.  SOCIAL HISTORY:  reports that she has quit smoking. Her smoking use included Cigarettes. She has a 30.00 pack-year smoking history. She does not have any smokeless tobacco history on file. She reports that she does not drink alcohol or use illicit drugs.    Post Discharge Medication Reconciliation Status: discharge medications reconciled and changed, per note/orders (see  BRITTAYN).  Current Outpatient Prescriptions   Medication Sig Dispense Refill     acetaminophen (TYLENOL) 325 MG tablet Take 650 mg by mouth every 6 hours as needed for pain        amiodarone (PACERONE/CODARONE) 200 MG tablet Take 1 tablet (200 mg) by mouth daily 30 tablet 0     AMLODIPINE BESYLATE PO Take 10 mg by mouth daily        clotrimazole (LOTRIMIN) 1 % cream Apply topically 2 times daily as needed       DULoxetine (CYMBALTA) 60 MG EC capsule Take 60 mg by mouth 2 times daily       ferrous sulfate (IRON) 325 (65 Fe) MG tablet Take 325 mg by mouth every other day       fluticasone (FLOVENT DISKUS) 50 MCG/BLIST AEPB Inhale 2 puffs into the lungs 2 times daily as needed       FOLIC ACID PO Take 1 mg by mouth daily       FUROSEMIDE PO Take 40 mg by mouth 2 times daily       GABAPENTIN PO Take 300 mg by mouth 3 times daily        hydrocortisone (CORTAID) 1 % cream Apply topically 2 times daily as needed       insulin aspart (NOVOLOG FLEXPEN) 100 UNIT/ML injection Inject 8 Units Subcutaneous 2 times daily (with meals) Give with lunch and dinner       insulin glargine (LANTUS) 100 UNIT/ML injection Inject 30 Units Subcutaneous At Bedtime        latanoprost (XALATAN) 0.005 % ophthalmic solution Place 1 drop into both eyes At Bedtime       levETIRAcetam (KEPPRA) 500 MG tablet Take 500 mg by mouth 2 times daily       MELATONIN PO Take 3 mg by mouth At Bedtime        METOPROLOL TARTRATE PO Take 100 mg by mouth 2 times daily        mupirocin (BACTROBAN) 2 % ointment Apply topically as needed       nitroglycerin (NITROSTAT) 0.4 MG sublingual tablet Place 0.4 mg under the tongue every 5 minutes as needed for chest pain For chest pain place 1 tablet under the tongue every 5 minutes for 3 doses. If symptoms persist 5 minutes after 1st dose call 911.       OMEPRAZOLE PO Take 20 mg by mouth daily        POTASSIUM CHLORIDE PO Take 40 mEq by mouth daily       PRAVASTATIN SODIUM PO Take 40 mg by mouth every evening        senna-docusate (SENOKOT-S;PERICOLACE) 8.6-50 MG per tablet Take 1 tablet by mouth 2 times daily as needed        timolol (TIMOPTIC) 0.5 % ophthalmic solution Place 1 drop into both eyes 2 times daily Hold if HR <60       TRAMADOL HCL PO Take 50 mg by mouth every 8 hours as needed for moderate to severe pain       triamcinolone (KENALOG) 0.1 % cream Apply topically 2 times daily       Warfarin Sodium (COUMADIN PO) Take as directed by INR current dose 5mg PO Mon and Thur. And 3mg PO Tues, Wed, Fri, Sat, Sun next INR 1/2/18         ROS:  10 point ROS of systems including Constitutional, Eyes, Respiratory, Cardiovascular, Gastroenterology, Genitourinary, Integumentary, Muscularskeletal, Psychiatric were all negative except for pertinent positives noted in my HPI.    Exam:  /76  Pulse 76  Resp 16  Wt 176 lb 3.2 oz (79.9 kg)  BMI 29.32 kg/m2  GENERAL APPEARANCE:  Alert, in no distress  RESP:  respiratory effort and palpation of chest normal, no respiratory distress, lungs sounds fine crackles in left lung  CV:  Palpation and auscultation of heart done , regular rate and rhythm, no murmur, rub, or gallop, edema none  ABDOMEN:  normal bowel sounds, soft, nontender, no hepatosplenomegaly or other masses  M/S:  Gait and station walks independently, gait steady, no tenderness or swelling of the joints   SKIN:  Inspection and palpation of skin and subcutaneous tissue at baseline  PSYCH:  insight and judgement, memory forgetful, affect and mood normal     Lab/Diagnostic data:    Component Value Ref Range Performed At   POTASSIUM 4.0 3.5 - 5.1 mmol/L [facility]     Basic Metabolic Profile Magnesium  Only the most recent of 6 results within the time period is included.    Basic Metabolic Profile Magnesium   Component Value Ref Range Performed At   SODIUM 137 136 - 145 mmol/L [facility]   POTASSIUM 2.9 (LL)Comment: Critical Result(s) Called at 06:47:08 08/27/2018 to and read  back by Nato on 7W by NCK 3.5 - 5.1 mmol/L [facility]   CHLORIDE 95 (L) 98 - 112 mmol/L [facility]   CARBON DIOXIDE 34 (H) 21 - 32 mmol/L [facility]   BUN (UREA NITRO) 25 (H) 7 - 24 mg/dL [facility]   CREATININE 1.34 (H) 0.55 - 1.02 mg/dL [facility]   EST GFR (MDRD) 38 (L) >60 mL/min [facility]   EST GFR IF  AM 47 (L) >60 mL/min [facility]   GLUCOSE 88 74 - 106 mg/dL [facility]   CALCIUM, SERUM 8.9 8.5 - 10.1 mg/dL [facility]   ANION GAP 8.0 0.0 - 15.0 mmol/L [facility]   Magnesium 2.2 1.8 - 2.4 mg/dL [facility]       BNP - Btype NA Peptide  Only the most recent of 2 results within the time period is included.    BNP - Btype NA Peptide   Component Value Ref Range Performed At   BNP-BTYPE NA PEPTIDE 489 (H) <=100 pg/mL [facility]     Iron Status with Ferritin  Iron Status with Ferritin   Component Value Ref Range Performed At   TIBC 318 250 - 450 ug/dL [facility]   IRON SATURATION 11 (L) 15 - 50 % [facility]   FERRITIN, SERUM 62 8 - 388 ng/mL [facility]   IRON 34 (L) 40 - 170 ug/dL [facility]   TRANSFERRIN, SERUM 245 200 - 360 mg/dL [facility]     CBC  CBC   Component Value Ref Range Performed At   WBC 6.0 4.3 - 10.8 K/uL [facility]   RBC 4.21 4.20 - 5.40 M/uL [facility]   HEMOGLOBIN 11.1 (L) 12.0 - 16.0 gm/dL [facility]   HEMATOCRIT 36.9 36.0 - 48.0 % [facility]   MCV 88 80 - 100 fl [facility]   MCH 26 (L) 27 - 33 pg [facility]   MCHC 30 (L) 33 - 36 gm/dL [facility]   RDW 19.1 (H) 11.5 -  14.5 % [facility]   PLATELET COUNT 131 (L) 150 - 400 K/uL [facility]   MPV 9.9 6.5 - 12.0 [facility]            ASSESSMENT/PLAN:  Chronic diastolic congestive heart failure (H)  Admitted to [facility] for CHF exacerbation.  Diuresed 11 pounds.  Patient continues to have orders to weigh daily and update provider for increase in weight.  Lasix was changed to torsemide, continues on metoprolol.  -Follow up with cardiology in 2 weeks    Type 2 diabetes mellitus with stage 3 chronic kidney disease, with long-term current use of insulin (H)  Lantus decreased.  Blood sugars have been controlled since return to assisted living.    -continue current dosing Recheck in 1 month.    CKD (chronic kidney disease) stage 3, GFR 30-59 ml/min  Stable.   Lab Results   Component Value Date    CR 1.10 04/30/2018      Keep renal function in mind with medication changes and illness, avoid nephrotoxic agents and monitor renal function every 6 - 12 months.      On supplemental oxygen therapy  She was admitted back to her assisted living on supplemental oxygen.  She currently does not have it in place.  Oxygen level not taken but will have home care for continued monitoring.  -Continue oxygen  -Home care to monitor oxygen sats to assess continued need    Chronic atrial fibrillation (H)   rate controlled with metoprolol, continues on warfarin for clot prevention.  Warfarin dose based on INR    Hypokalemia  Due to torsemide. on replacement protocol at hospital.  Discharged to home with 30 mEq daily of potassium.  -Check BMP    Orders:  1. Check bmp on wednesday    Electronically signed by:  Manda Molina NP                    Sincerely,        Manda Molina NP

## 2018-08-31 VITALS
BODY MASS INDEX: 29.32 KG/M2 | RESPIRATION RATE: 16 BRPM | WEIGHT: 176.2 LBS | DIASTOLIC BLOOD PRESSURE: 76 MMHG | HEART RATE: 76 BPM | SYSTOLIC BLOOD PRESSURE: 145 MMHG

## 2018-09-04 ENCOUNTER — RECORDS - HEALTHEAST (OUTPATIENT)
Dept: LAB | Facility: CLINIC | Age: 78
End: 2018-09-04

## 2018-09-05 ENCOUNTER — MEDICAL CORRESPONDENCE (OUTPATIENT)
Dept: HEALTH INFORMATION MANAGEMENT | Facility: CLINIC | Age: 78
End: 2018-09-05

## 2018-09-05 ENCOUNTER — TRANSFERRED RECORDS (OUTPATIENT)
Dept: HEALTH INFORMATION MANAGEMENT | Facility: CLINIC | Age: 78
End: 2018-09-05

## 2018-09-05 LAB
ANION GAP SERPL CALCULATED.3IONS-SCNC: 11 MMOL/L (ref 5–18)
ANION GAP SERPL CALCULATED.3IONS-SCNC: 11 MMOL/L (ref 5–18)
BUN SERPL-MCNC: 26 MG/DL (ref 8–28)
BUN SERPL-MCNC: 26 MG/DL (ref 8–28)
CALCIUM SERPL-MCNC: 9.3 MG/DL (ref 8.5–10.5)
CALCIUM SERPL-MCNC: 9.3 MG/DL (ref 8.5–10.5)
CHLORIDE BLD-SCNC: 95 MMOL/L (ref 98–107)
CHLORIDE SERPLBLD-SCNC: 95 MMOL/L (ref 98–107)
CO2 SERPL-SCNC: 33 MMOL/L (ref 22–31)
CO2 SERPL-SCNC: 33 MMOL/L (ref 22–31)
CREAT SERPL-MCNC: 1.21 MG/DL (ref 0.6–1.1)
CREAT SERPL-MCNC: 1.21 MG/DL (ref 0.6–1.1)
GFR SERPL CREATININE-BSD FRML MDRD: 43 ML/MIN/1.73M2
GFR SERPL CREATININE-BSD FRML MDRD: 43 ML/MIN/1.73M2
GLUCOSE BLD-MCNC: 274 MG/DL (ref 70–125)
GLUCOSE SERPL-MCNC: 274 MG/DL (ref 70–125)
POTASSIUM BLD-SCNC: 3.1 MMOL/L (ref 3.5–5)
POTASSIUM SERPL-SCNC: 3.1 MMOL/L (ref 3.5–5)
SODIUM SERPL-SCNC: 139 MMOL/L (ref 136–145)
SODIUM SERPL-SCNC: 139 MMOL/L (ref 136–145)

## 2018-09-13 DIAGNOSIS — Z53.9 ERRONEOUS ENCOUNTER--DISREGARD: Primary | ICD-10-CM

## 2018-09-21 ENCOUNTER — RECORDS - HEALTHEAST (OUTPATIENT)
Dept: LAB | Facility: CLINIC | Age: 78
End: 2018-09-21

## 2018-09-24 LAB
ANION GAP SERPL CALCULATED.3IONS-SCNC: 10 MMOL/L (ref 5–18)
BUN SERPL-MCNC: 21 MG/DL (ref 8–28)
CALCIUM SERPL-MCNC: 8.9 MG/DL (ref 8.5–10.5)
CHLORIDE BLD-SCNC: 97 MMOL/L (ref 98–107)
CO2 SERPL-SCNC: 33 MMOL/L (ref 22–31)
CREAT SERPL-MCNC: 1.24 MG/DL (ref 0.6–1.1)
GFR SERPL CREATININE-BSD FRML MDRD: 42 ML/MIN/1.73M2
GLUCOSE BLD-MCNC: 215 MG/DL (ref 70–125)
POTASSIUM BLD-SCNC: 3 MMOL/L (ref 3.5–5)
SODIUM SERPL-SCNC: 140 MMOL/L (ref 136–145)

## 2018-10-03 ENCOUNTER — TELEPHONE (OUTPATIENT)
Dept: GERIATRICS | Facility: CLINIC | Age: 78
End: 2018-10-03

## 2018-10-03 NOTE — TELEPHONE ENCOUNTER
Patient will no longer be followed by onsite [place] Team. All concerns should be addressed by PCP with [facility].     Manda Molina NP

## 2018-10-17 ENCOUNTER — RECORDS - HEALTHEAST (OUTPATIENT)
Dept: LAB | Facility: CLINIC | Age: 78
End: 2018-10-17

## 2018-10-17 LAB
ALBUMIN UR-MCNC: ABNORMAL MG/DL
APPEARANCE UR: ABNORMAL
BACTERIA #/AREA URNS HPF: ABNORMAL HPF
BILIRUB UR QL STRIP: NEGATIVE
COLOR UR AUTO: YELLOW
GLUCOSE UR STRIP-MCNC: NEGATIVE MG/DL
HGB UR QL STRIP: NEGATIVE
KETONES UR STRIP-MCNC: NEGATIVE MG/DL
LEUKOCYTE ESTERASE UR QL STRIP: ABNORMAL
NITRATE UR QL: POSITIVE
PH UR STRIP: 5 [PH] (ref 4.5–8)
RBC #/AREA URNS AUTO: ABNORMAL HPF
SP GR UR STRIP: 1.01 (ref 1–1.03)
SQUAMOUS #/AREA URNS AUTO: ABNORMAL LPF
UROBILINOGEN UR STRIP-ACNC: ABNORMAL
WBC #/AREA URNS AUTO: >100 HPF
WBC CLUMPS #/AREA URNS HPF: PRESENT /[HPF]

## 2018-10-18 LAB — BACTERIA SPEC CULT: NORMAL

## 2018-10-25 ENCOUNTER — RECORDS - HEALTHEAST (OUTPATIENT)
Dept: LAB | Facility: CLINIC | Age: 78
End: 2018-10-25

## 2018-10-26 LAB
ANION GAP SERPL CALCULATED.3IONS-SCNC: 9 MMOL/L (ref 5–18)
BUN SERPL-MCNC: 26 MG/DL (ref 8–28)
CALCIUM SERPL-MCNC: 8.9 MG/DL (ref 8.5–10.5)
CHLORIDE BLD-SCNC: 104 MMOL/L (ref 98–107)
CO2 SERPL-SCNC: 28 MMOL/L (ref 22–31)
CREAT SERPL-MCNC: 1.01 MG/DL (ref 0.6–1.1)
GFR SERPL CREATININE-BSD FRML MDRD: 53 ML/MIN/1.73M2
GLUCOSE BLD-MCNC: 77 MG/DL (ref 70–125)
POTASSIUM BLD-SCNC: 3.4 MMOL/L (ref 3.5–5)
SODIUM SERPL-SCNC: 141 MMOL/L (ref 136–145)

## 2018-11-01 ENCOUNTER — RECORDS - HEALTHEAST (OUTPATIENT)
Dept: LAB | Facility: CLINIC | Age: 78
End: 2018-11-01

## 2018-11-02 LAB
ANION GAP SERPL CALCULATED.3IONS-SCNC: 13 MMOL/L (ref 5–18)
BUN SERPL-MCNC: 19 MG/DL (ref 8–28)
CALCIUM SERPL-MCNC: 8.3 MG/DL (ref 8.5–10.5)
CHLORIDE BLD-SCNC: 105 MMOL/L (ref 98–107)
CO2 SERPL-SCNC: 24 MMOL/L (ref 22–31)
CREAT SERPL-MCNC: 1.11 MG/DL (ref 0.6–1.1)
GFR SERPL CREATININE-BSD FRML MDRD: 48 ML/MIN/1.73M2
GLUCOSE BLD-MCNC: 85 MG/DL (ref 70–125)
POTASSIUM BLD-SCNC: 3.6 MMOL/L (ref 3.5–5)
SODIUM SERPL-SCNC: 142 MMOL/L (ref 136–145)

## 2018-11-05 ENCOUNTER — RECORDS - HEALTHEAST (OUTPATIENT)
Dept: LAB | Facility: CLINIC | Age: 78
End: 2018-11-05

## 2018-11-05 LAB
ANION GAP SERPL CALCULATED.3IONS-SCNC: 10 MMOL/L (ref 5–18)
BUN SERPL-MCNC: 22 MG/DL (ref 8–28)
CALCIUM SERPL-MCNC: 8.4 MG/DL (ref 8.5–10.5)
CHLORIDE BLD-SCNC: 105 MMOL/L (ref 98–107)
CO2 SERPL-SCNC: 26 MMOL/L (ref 22–31)
CREAT SERPL-MCNC: 1.11 MG/DL (ref 0.6–1.1)
GFR SERPL CREATININE-BSD FRML MDRD: 48 ML/MIN/1.73M2
GLUCOSE BLD-MCNC: 156 MG/DL (ref 70–125)
POTASSIUM BLD-SCNC: 4 MMOL/L (ref 3.5–5)
SODIUM SERPL-SCNC: 141 MMOL/L (ref 136–145)

## 2018-11-09 ENCOUNTER — RECORDS - HEALTHEAST (OUTPATIENT)
Dept: LAB | Facility: CLINIC | Age: 78
End: 2018-11-09

## 2018-11-09 LAB — INR PPP: 2.27 (ref 0.9–1.1)

## 2018-11-15 ENCOUNTER — RECORDS - HEALTHEAST (OUTPATIENT)
Dept: LAB | Facility: CLINIC | Age: 78
End: 2018-11-15

## 2018-11-16 LAB — INR PPP: 1.45 (ref 0.9–1.1)

## 2018-11-20 ENCOUNTER — RECORDS - HEALTHEAST (OUTPATIENT)
Dept: LAB | Facility: CLINIC | Age: 78
End: 2018-11-20

## 2018-11-21 LAB — INR PPP: 1.32 (ref 0.9–1.1)

## 2018-11-24 ENCOUNTER — RECORDS - HEALTHEAST (OUTPATIENT)
Dept: LAB | Facility: CLINIC | Age: 78
End: 2018-11-24

## 2018-11-24 LAB
ALBUMIN UR-MCNC: NEGATIVE MG/DL
APPEARANCE UR: ABNORMAL
BACTERIA #/AREA URNS HPF: ABNORMAL HPF
BILIRUB UR QL STRIP: NEGATIVE
COLOR UR AUTO: YELLOW
GLUCOSE UR STRIP-MCNC: NEGATIVE MG/DL
HGB UR QL STRIP: NEGATIVE
KETONES UR STRIP-MCNC: NEGATIVE MG/DL
LEUKOCYTE ESTERASE UR QL STRIP: ABNORMAL
MUCOUS THREADS #/AREA URNS LPF: ABNORMAL LPF
NITRATE UR QL: NEGATIVE
PH UR STRIP: 5 [PH] (ref 4.5–8)
RBC #/AREA URNS AUTO: ABNORMAL HPF
SP GR UR STRIP: 1.01 (ref 1–1.03)
SQUAMOUS #/AREA URNS AUTO: ABNORMAL LPF
UROBILINOGEN UR STRIP-ACNC: ABNORMAL
WBC #/AREA URNS AUTO: >100 HPF
WBC CLUMPS #/AREA URNS HPF: PRESENT /[HPF]

## 2018-11-26 LAB — BACTERIA SPEC CULT: ABNORMAL

## 2018-11-27 ENCOUNTER — RECORDS - HEALTHEAST (OUTPATIENT)
Dept: LAB | Facility: CLINIC | Age: 78
End: 2018-11-27

## 2018-11-28 LAB — INR PPP: 1.5 (ref 0.9–1.1)

## 2018-12-05 ENCOUNTER — RECORDS - HEALTHEAST (OUTPATIENT)
Dept: LAB | Facility: CLINIC | Age: 78
End: 2018-12-05

## 2018-12-05 LAB — INR PPP: 1.46 (ref 0.9–1.1)

## 2018-12-07 ENCOUNTER — RECORDS - HEALTHEAST (OUTPATIENT)
Dept: LAB | Facility: CLINIC | Age: 78
End: 2018-12-07

## 2018-12-07 LAB
ALBUMIN UR-MCNC: ABNORMAL MG/DL
APPEARANCE UR: ABNORMAL
BACTERIA #/AREA URNS HPF: ABNORMAL HPF
BILIRUB UR QL STRIP: NEGATIVE
COLOR UR AUTO: YELLOW
GLUCOSE UR STRIP-MCNC: NEGATIVE MG/DL
HGB UR QL STRIP: ABNORMAL
KETONES UR STRIP-MCNC: NEGATIVE MG/DL
LEUKOCYTE ESTERASE UR QL STRIP: ABNORMAL
NITRATE UR QL: NEGATIVE
PH UR STRIP: 6 [PH] (ref 4.5–8)
RBC #/AREA URNS AUTO: ABNORMAL HPF
SP GR UR STRIP: 1.02 (ref 1–1.03)
SQUAMOUS #/AREA URNS AUTO: ABNORMAL LPF
UROBILINOGEN UR STRIP-ACNC: ABNORMAL
WBC #/AREA URNS AUTO: >100 HPF
WBC CLUMPS #/AREA URNS HPF: PRESENT /[HPF]

## 2018-12-09 LAB — BACTERIA SPEC CULT: ABNORMAL

## 2018-12-11 ENCOUNTER — RECORDS - HEALTHEAST (OUTPATIENT)
Dept: LAB | Facility: CLINIC | Age: 78
End: 2018-12-11

## 2018-12-12 LAB — INR PPP: 1.58 (ref 0.9–1.1)

## 2018-12-18 ENCOUNTER — RECORDS - HEALTHEAST (OUTPATIENT)
Dept: LAB | Facility: CLINIC | Age: 78
End: 2018-12-18

## 2018-12-19 LAB — INR PPP: 1.84 (ref 0.9–1.1)

## 2018-12-24 ENCOUNTER — RECORDS - HEALTHEAST (OUTPATIENT)
Dept: LAB | Facility: CLINIC | Age: 78
End: 2018-12-24

## 2018-12-26 LAB — INR PPP: 2.01 (ref 0.9–1.1)

## 2018-12-31 ENCOUNTER — RECORDS - HEALTHEAST (OUTPATIENT)
Dept: LAB | Facility: CLINIC | Age: 78
End: 2018-12-31

## 2019-01-02 LAB
ANION GAP SERPL CALCULATED.3IONS-SCNC: 9 MMOL/L (ref 5–18)
BUN SERPL-MCNC: 31 MG/DL (ref 8–28)
CALCIUM SERPL-MCNC: 8.7 MG/DL (ref 8.5–10.5)
CHLORIDE BLD-SCNC: 103 MMOL/L (ref 98–107)
CO2 SERPL-SCNC: 28 MMOL/L (ref 22–31)
CREAT SERPL-MCNC: 1.06 MG/DL (ref 0.6–1.1)
GFR SERPL CREATININE-BSD FRML MDRD: 50 ML/MIN/1.73M2
GLUCOSE BLD-MCNC: 254 MG/DL (ref 70–125)
INR PPP: 2.34 (ref 0.9–1.1)
POTASSIUM BLD-SCNC: 3.4 MMOL/L (ref 3.5–5)
SODIUM SERPL-SCNC: 140 MMOL/L (ref 136–145)

## 2019-01-08 ENCOUNTER — RECORDS - HEALTHEAST (OUTPATIENT)
Dept: LAB | Facility: CLINIC | Age: 79
End: 2019-01-08

## 2019-01-09 LAB — INR PPP: 2.35 (ref 0.9–1.1)

## 2019-01-11 ENCOUNTER — ANCILLARY PROCEDURE (OUTPATIENT)
Dept: GENERAL RADIOLOGY | Facility: CLINIC | Age: 79
End: 2019-01-11
Payer: MEDICARE

## 2019-01-11 ENCOUNTER — OFFICE VISIT (OUTPATIENT)
Dept: RHEUMATOLOGY | Facility: CLINIC | Age: 79
End: 2019-01-11
Payer: MEDICARE

## 2019-01-11 VITALS
DIASTOLIC BLOOD PRESSURE: 70 MMHG | SYSTOLIC BLOOD PRESSURE: 134 MMHG | HEART RATE: 61 BPM | BODY MASS INDEX: 30.89 KG/M2 | WEIGHT: 185.4 LBS | OXYGEN SATURATION: 94 % | HEIGHT: 65 IN

## 2019-01-11 DIAGNOSIS — M19.90 INFLAMMATORY ARTHRITIS: Primary | ICD-10-CM

## 2019-01-11 DIAGNOSIS — M19.90 INFLAMMATORY ARTHRITIS: ICD-10-CM

## 2019-01-11 DIAGNOSIS — Z11.59 ENCOUNTER FOR SCREENING FOR OTHER VIRAL DISEASES: ICD-10-CM

## 2019-01-11 DIAGNOSIS — R82.90 NONSPECIFIC FINDING ON EXAMINATION OF URINE: ICD-10-CM

## 2019-01-11 LAB
ALBUMIN SERPL-MCNC: 3.8 G/DL (ref 3.4–5)
ALBUMIN UR-MCNC: NEGATIVE MG/DL
ALP SERPL-CCNC: 157 U/L (ref 40–150)
ALT SERPL W P-5'-P-CCNC: 29 U/L (ref 0–50)
ANION GAP SERPL CALCULATED.3IONS-SCNC: 3 MMOL/L (ref 3–14)
APPEARANCE UR: CLEAR
AST SERPL W P-5'-P-CCNC: 22 U/L (ref 0–45)
BACTERIA #/AREA URNS HPF: ABNORMAL /HPF
BASOPHILS # BLD AUTO: 0 10E9/L (ref 0–0.2)
BASOPHILS NFR BLD AUTO: 0.2 %
BILIRUB SERPL-MCNC: 0.5 MG/DL (ref 0.2–1.3)
BILIRUB UR QL STRIP: NEGATIVE
BUN SERPL-MCNC: 24 MG/DL (ref 7–30)
C3 SERPL-MCNC: 95 MG/DL (ref 76–169)
C4 SERPL-MCNC: 18 MG/DL (ref 15–50)
CALCIUM SERPL-MCNC: 9 MG/DL (ref 8.5–10.1)
CHLORIDE SERPL-SCNC: 105 MMOL/L (ref 94–109)
CK SERPL-CCNC: 45 U/L (ref 30–225)
CO2 SERPL-SCNC: 33 MMOL/L (ref 20–32)
COLOR UR AUTO: YELLOW
CREAT SERPL-MCNC: 1.1 MG/DL (ref 0.52–1.04)
CRP SERPL-MCNC: 4.7 MG/L (ref 0–8)
DIFFERENTIAL METHOD BLD: ABNORMAL
EOSINOPHIL # BLD AUTO: 0.1 10E9/L (ref 0–0.7)
EOSINOPHIL NFR BLD AUTO: 2 %
ERYTHROCYTE [DISTWIDTH] IN BLOOD BY AUTOMATED COUNT: 15.5 % (ref 10–15)
ERYTHROCYTE [SEDIMENTATION RATE] IN BLOOD BY WESTERGREN METHOD: 17 MM/H (ref 0–30)
GFR SERPL CREATININE-BSD FRML MDRD: 48 ML/MIN/{1.73_M2}
GLUCOSE SERPL-MCNC: 93 MG/DL (ref 70–99)
GLUCOSE UR STRIP-MCNC: NEGATIVE MG/DL
HBV CORE AB SERPL QL IA: NONREACTIVE
HBV SURFACE AG SERPL QL IA: NONREACTIVE
HCT VFR BLD AUTO: 37.9 % (ref 35–47)
HCV AB SERPL QL IA: NONREACTIVE
HGB BLD-MCNC: 11.9 G/DL (ref 11.7–15.7)
HGB UR QL STRIP: ABNORMAL
HIV 1+2 AB+HIV1 P24 AG SERPL QL IA: NONREACTIVE
KETONES UR STRIP-MCNC: NEGATIVE MG/DL
LEUKOCYTE ESTERASE UR QL STRIP: ABNORMAL
LYMPHOCYTES # BLD AUTO: 0.8 10E9/L (ref 0.8–5.3)
LYMPHOCYTES NFR BLD AUTO: 15.4 %
MCH RBC QN AUTO: 31.6 PG (ref 26.5–33)
MCHC RBC AUTO-ENTMCNC: 31.4 G/DL (ref 31.5–36.5)
MCV RBC AUTO: 101 FL (ref 78–100)
MONOCYTES # BLD AUTO: 0.6 10E9/L (ref 0–1.3)
MONOCYTES NFR BLD AUTO: 12.5 %
NEUTROPHILS # BLD AUTO: 3.4 10E9/L (ref 1.6–8.3)
NEUTROPHILS NFR BLD AUTO: 69.9 %
NITRATE UR QL: POSITIVE
NON-SQ EPI CELLS #/AREA URNS LPF: ABNORMAL /LPF
PH UR STRIP: 5 PH (ref 5–7)
PLATELET # BLD AUTO: 163 10E9/L (ref 150–450)
POTASSIUM SERPL-SCNC: 4.2 MMOL/L (ref 3.4–5.3)
PROT SERPL-MCNC: 7.6 G/DL (ref 6.8–8.8)
PROT UR-MCNC: 0.15 G/L
PROT/CREAT 24H UR: 0.42 G/G CR (ref 0–0.2)
RBC # BLD AUTO: 3.77 10E12/L (ref 3.8–5.2)
RBC #/AREA URNS AUTO: ABNORMAL /HPF
RHEUMATOID FACT SER NEPH-ACNC: <20 IU/ML (ref 0–20)
SODIUM SERPL-SCNC: 141 MMOL/L (ref 133–144)
SOURCE: ABNORMAL
SP GR UR STRIP: 1.01 (ref 1–1.03)
UROBILINOGEN UR STRIP-ACNC: 0.2 EU/DL (ref 0.2–1)
WBC # BLD AUTO: 4.9 10E9/L (ref 4–11)
WBC #/AREA URNS AUTO: ABNORMAL /HPF

## 2019-01-11 PROCEDURE — 86803 HEPATITIS C AB TEST: CPT | Performed by: INTERNAL MEDICINE

## 2019-01-11 PROCEDURE — 87088 URINE BACTERIA CULTURE: CPT | Performed by: INTERNAL MEDICINE

## 2019-01-11 PROCEDURE — 86431 RHEUMATOID FACTOR QUANT: CPT | Performed by: INTERNAL MEDICINE

## 2019-01-11 PROCEDURE — 86480 TB TEST CELL IMMUN MEASURE: CPT | Performed by: INTERNAL MEDICINE

## 2019-01-11 PROCEDURE — 86704 HEP B CORE ANTIBODY TOTAL: CPT | Performed by: INTERNAL MEDICINE

## 2019-01-11 PROCEDURE — 87389 HIV-1 AG W/HIV-1&-2 AB AG IA: CPT | Performed by: INTERNAL MEDICINE

## 2019-01-11 PROCEDURE — 86235 NUCLEAR ANTIGEN ANTIBODY: CPT | Performed by: INTERNAL MEDICINE

## 2019-01-11 PROCEDURE — 86140 C-REACTIVE PROTEIN: CPT | Performed by: INTERNAL MEDICINE

## 2019-01-11 PROCEDURE — 86160 COMPLEMENT ANTIGEN: CPT | Performed by: INTERNAL MEDICINE

## 2019-01-11 PROCEDURE — 85025 COMPLETE CBC W/AUTO DIFF WBC: CPT | Performed by: INTERNAL MEDICINE

## 2019-01-11 PROCEDURE — 86225 DNA ANTIBODY NATIVE: CPT | Performed by: INTERNAL MEDICINE

## 2019-01-11 PROCEDURE — 84156 ASSAY OF PROTEIN URINE: CPT | Performed by: INTERNAL MEDICINE

## 2019-01-11 PROCEDURE — 73630 X-RAY EXAM OF FOOT: CPT | Mod: LT

## 2019-01-11 PROCEDURE — 81001 URINALYSIS AUTO W/SCOPE: CPT | Performed by: INTERNAL MEDICINE

## 2019-01-11 PROCEDURE — G0499 HEPB SCREEN HIGH RISK INDIV: HCPCS | Performed by: INTERNAL MEDICINE

## 2019-01-11 PROCEDURE — 86147 CARDIOLIPIN ANTIBODY EA IG: CPT | Performed by: INTERNAL MEDICINE

## 2019-01-11 PROCEDURE — 36415 COLL VENOUS BLD VENIPUNCTURE: CPT | Performed by: INTERNAL MEDICINE

## 2019-01-11 PROCEDURE — 86146 BETA-2 GLYCOPROTEIN ANTIBODY: CPT | Performed by: INTERNAL MEDICINE

## 2019-01-11 PROCEDURE — 73130 X-RAY EXAM OF HAND: CPT | Mod: RT

## 2019-01-11 PROCEDURE — 87086 URINE CULTURE/COLONY COUNT: CPT | Performed by: INTERNAL MEDICINE

## 2019-01-11 PROCEDURE — 71046 X-RAY EXAM CHEST 2 VIEWS: CPT

## 2019-01-11 PROCEDURE — 86200 CCP ANTIBODY: CPT | Performed by: INTERNAL MEDICINE

## 2019-01-11 PROCEDURE — 99204 OFFICE O/P NEW MOD 45 MIN: CPT | Performed by: INTERNAL MEDICINE

## 2019-01-11 PROCEDURE — 86038 ANTINUCLEAR ANTIBODIES: CPT | Performed by: INTERNAL MEDICINE

## 2019-01-11 PROCEDURE — 86039 ANTINUCLEAR ANTIBODIES (ANA): CPT | Performed by: INTERNAL MEDICINE

## 2019-01-11 PROCEDURE — 82550 ASSAY OF CK (CPK): CPT | Performed by: INTERNAL MEDICINE

## 2019-01-11 PROCEDURE — 85652 RBC SED RATE AUTOMATED: CPT | Performed by: INTERNAL MEDICINE

## 2019-01-11 PROCEDURE — 87186 SC STD MICRODIL/AGAR DIL: CPT | Performed by: INTERNAL MEDICINE

## 2019-01-11 PROCEDURE — 80053 COMPREHEN METABOLIC PANEL: CPT | Performed by: INTERNAL MEDICINE

## 2019-01-11 RX ORDER — ONDANSETRON 4 MG/1
4 TABLET, ORALLY DISINTEGRATING ORAL EVERY 8 HOURS PRN
COMMUNITY
Start: 2018-10-30

## 2019-01-11 RX ORDER — MECLIZINE HYDROCHLORIDE 25 MG/1
25 TABLET ORAL 3 TIMES DAILY PRN
COMMUNITY
Start: 2018-10-10

## 2019-01-11 RX ORDER — TORSEMIDE 20 MG/1
20 TABLET ORAL
COMMUNITY
Start: 2018-11-14 | End: 2019-12-02

## 2019-01-11 ASSESSMENT — MIFFLIN-ST. JEOR: SCORE: 1331.85

## 2019-01-11 NOTE — Clinical Note
Please fax my clinic note dated 1/11/2019 to Ms. Cruz's PCP:Dr. Ga Allen at [facility] in [facility] Jim perry MD1/14/2019 3:55 AM

## 2019-01-11 NOTE — PROGRESS NOTES
Rheumatology Clinic Visit      Diya Cruz MRN# [id number]   YOB: 1940 Age: 78 year old      Date of visit: 1/11/19   PCP: Dr. Ga Allen at [facility] in [place]    Chief Complaint   Patient presents with:  Consult: Patient has Lupus and RA. Patient has a lot of stiffness.      Assessment and Plan     1.  Inflammatory polyarthropathy: Reportedly diagnosed with rheumatoid arthritis in about 2000; and there is a question of SLE per patient. Established care with me today, 1/11/2019. symmetric synovitis of the MCPs on exam today.  Symptoms and exam are not suggestive of SLE, but will do additional workup today.  Previous medications include MTX (ineffective per patient), remicade (first dose associated with a seizure).  Items complicating tx would be recent LFT elevation (and if still elevated then would avoid leflunomide), retinopathy (so will avoid HCQ), and heart failure (so will avoid TNF inhibitors).  We discussed leflunomide and orencia in detail today, and will plan on starting leflunomide unless labs show a contraindication to this.   - Labs: CBC, CMP, ESR, CRP, VIVEK, DARRICK, dsDNA, C3, C4, beta-2 glycoprotein, cardiolipin antibody CK, hepatitis B/C, QuantiFERON-TB Gold plus, UA, Uprotein:creatinine  - X-rays: chest, bilateral feet/hands    # Leflunomide (Arava) Risks and Benefits: The risks and benefits of leflunomide were discussed in detail and the patient verbalized understanding.  The risks discussed include, but are not limited to, the risk for hypersensitivity, anaphylaxis, anaphylactoid reactions, hepatotoxicity, infections, interstitial lung disease, alopecia, rash, nausea, and diarrhea.  The impact on malignancies is not fully defined.   Alcohol should be avoided while taking leflunomide.  Pregnancy prevention and planning was discussed; it is recommended that women of childbearing potential use reliable contraception before, during, and for a period of 2  years after treatment with leflunomide; if pregnancy is planned within 2 years of discontinuing medication then women should undergo drug elimination procedures (i.e. cholestyramine). Routine laboratory monitoring is required during leflunomide therapy. I encouraged reviewing the package insert and asking any questions about the medication.      # Abatacept (Orencia) Risks and Benefits: The risks and benefits of abatacept were discussed in detail and the patient verbalized understanding.  The risks discussed include, but are not limited to, the risk for hypersensitivity, anaphylaxis, anaphylactoid reactions, an increased risk for serious infections leading to hospitalization or death, and an increased risk for more frequent respiratory adverse events in patients with COPD.  If subcutaneous injections are used, then injection site reactions and/or pain may occur at the site of injection.  The most common side effects were discussed that include headache, upper respiratory tract infections, nasopharyngitis, and nausea.  It was discussed that the medication would need to be discontinued if a serious infection develops.  It was discussed that live vaccinations should not be received while using abatacept or within 30 days prior to starting abatacept.  I encouraged reviewing the package insert and asking any questions about the medication.      2. Bone Health: She reports a hx of osteoporosis that is already managed by her PCP.     Ms. Cruz verbalized agreement with and understanding of the rational for the diagnosis and treatment plan.  All questions were answered to best of my ability and the patient's satisfaction. Ms. Cruz was advised to contact the clinic with any questions that may arise after the clinic visit.      Thank you for involving me in the care of the patient    Return to clinic: 3 months      HPI   Diya Cruz is a 78 year old female with a past medical history significant for lung nodules,  chronic back pain, low iron, spinal stenosis, depression, obstructive sleep apnea, peripheral arterial disease, heart failure, hypertension, atrial fibrillation on anticoagulation, diabetes, seizure history, retinopathy chronic kidney disease, lupus, and rheumatoid arthritis who presents for initial rheumatology evaluation in this clinic of lupus and rheumatoid arthritis.    Today, Ms. Cruz reports that she has lupus and RA.  Neuropathy in her toes that comes and goes.  Slow to get up in the AM.  COPD and is on supplemental O2.  Joint pain in knees and bilateral 1st toes, hips, lower back with hx of herniated disk and vertebral fx. Has been seeing rheumatologists since about 2000.  Was on MTX and prednisone.  She says that methotrexate was used for a while without clear benefit.  Morning stiffness for about 1 hour, better with activity.  Fingers and toes swell.  Ankles swell.  Joint pains improved but initial activity, but then also worsen with too much activity.  Has spinal stenosis hx.  Going to [facility] pain clinic where they wanted to do steroid injections but is on anticoagulation for atrial fibrillation so has not done the injection yet.  The patient, and her son who is with her today, explained more that her rheumatoid arthritis has been an issue, but lupus was never completely clear as a diagnosis for her versus just a comment made by previous rheumatologist.    Recalls having a seizure when on remicade.     Denies fevers, chills, nausea, vomiting.  Occasional constipation or diarrhea. No abdominal pain. No chest pain/pressure, palpitations, or shortness of breath, but notes that she does have COPD so sometimes feels short of breath; she uses supplemental oxygen but is questioning if she needs it. No LE swelling. No neck pain. No oral or nasal sores.  No rash. No sicca symptoms. No photosensitivity or photophobia. No eye pain or redness. No history of inflammatory eye disease.  No history of DVT or  pulmonary embolism; hx of CVA with mild residual memory loss.  No history of serositis.  No history of Raynaud's Phenomenon.        No pain or burning with urination.     Tobacco: former smoker  EtOH: none  Drugs: none    ROS   GEN: No fevers, chills, night sweats, or weight change  SKIN: No itching, rashes, sores  HEENT: No oral or nasal ulcers.  CV: See HPI  PULM: See HPI  GI: See HPI.  No blood in stool. No abdominal pain.  : No blood in urine.  MSK: See HPI.  NEURO: Chronic stable numbness and tingling in the bilateral lower legs that she attributes to spine issues  ENDO: No heat/cold intolerance.  EXT: No LE swelling  PSYCH: Negative    Active Problem List     Patient Active Problem List   Diagnosis     Lung nodule < 6cm on CT     Anxiety     Stenosis of carotid artery     Candidal intertrigo     Chronic back pain     Fatigue     Glaucoma     History of adenomatous polyp of colon     History of endovascular stent graft for abdominal aortic aneurysm     Mass of breast     Hypoferremia     Spinal stenosis of lumbar region     Microalbuminuria     Mild episode of recurrent major depressive disorder (H)     Multiple-type hyperlipidemia     Obstructive sleep apnea syndrome     Peripheral arterial occlusive disease (H)     Nondiabetic proliferative retinopathy     Rheumatoid arthritis (H)     Seasonal allergic rhinitis     Seizure disorder (H)     Type 2 diabetes mellitus (H)     Sick sinus syndrome (H)     Elevated LFTs     Physical deconditioning     Chronic atrial fibrillation (H)     Benign essential hypertension     Chronic diastolic congestive heart failure (H)     CKD (chronic kidney disease) stage 3, GFR 30-59 ml/min (H)     Anemia, unspecified type     Cognitive impairment     Hx of seizure disorder     Closed supracondylar fracture of left humerus with routine healing, subsequent encounter     Advanced directives, counseling/discussion     Falls frequently     Skin problem     Pulmonary nodules     Past  Medical History     Past Medical History:   Diagnosis Date     Abdominal aortic stenosis      Anxiety      Basal cell carcinoma      Carotid stenosis      Cerebrovascular accident (CVA) (H) 8/19/2013     Claudication (H)      Compression fracture      Depressive disorder      GI bleed      Glaucoma      Hyperlipidemia      Meniere syndrome      Obesity      Obstructive sleep apnea (adult) (pediatric)      Peripheral vascular disease, unspecified (H)      Pure hypercholesterolemia      Rheumatoid arthritis(714.0)      Seizure disorder (H)      Sjogren's disease (H)      Type II or unspecified type diabetes mellitus without mention of complication, not stated as uncontrolled      Unspecified cerebral artery occlusion with cerebral infarction      Unspecified epilepsy without mention of intractable epilepsy      Unspecified essential hypertension      Past Surgical History     Past Surgical History:   Procedure Laterality Date     ABDOMEN SURGERY      AAA Repair, abdominal aorta and illiac stents placed     BREAST SURGERY      Breast Biopsy     CARDIAC SURGERY      Aortic Stent Graft     CARDIAC SURGERY      Cardiac Catheterization     CATARACT IOL, RT/LT  2010    Right     GYN SURGERY      Hysterectomy     HEAD & NECK SURGERY      Tonsillectomy     HYSTERECTOMY      age 41     LASER YAG CAPSULOTOMY Bilateral 8/27/2015    Procedure: LASER YAG CAPSULOTOMY;  Surgeon: Gus Gregorio MD;  Location: [facility] OR     PHACOEMULSIFICATION WITH STANDARD INTRAOCULAR LENS IMPLANT  6/26/2014    Procedure: PHACOEMULSIFICATION WITH STANDARD INTRAOCULAR LENS IMPLANT;  Surgeon: Gus Gregorio MD;  Location: [facility] OR     TONSILLECTOMY      age 22     VASCULAR SURGERY      Angio extremity bilateral     Allergy     Allergies   Allergen Reactions     Rosiglitazone Hives     Ambien      Sleepwalking     Ambien [Zolpidem] Other (See Comments) and Unknown     Sleepwalking     Sulfa Drugs Unknown     Lightheaded, GI  upset  Lightheaded, GI upset  Lightheaded, GI upset     Azithromycin Diarrhea     Clindamycin Diarrhea     Codeine Nausea and Vomiting     Other reaction(s): Vomiting  Other reaction(s): Vomiting     Lisinopril Cough     Nitrofurantoin Diarrhea     Simvastatin Diarrhea     Triamterene-Hctz [Hydrochlorothiazide W/Triamterene] Swelling and Nausea     Other reaction(s): Myalgia     Current Medication List     Current Outpatient Medications   Medication Sig     acetaminophen (TYLENOL) 325 MG tablet Take 650 mg by mouth every 6 hours as needed for pain      insulin aspart (NOVOLOG FLEXPEN) 100 UNIT/ML injection Inject 8 Units Subcutaneous 2 times daily (with meals) Give with lunch and dinner     insulin glargine (LANTUS) 100 UNIT/ML injection Inject 30 Units Subcutaneous At Bedtime      latanoprost (XALATAN) 0.005 % ophthalmic solution Place 1 drop into both eyes At Bedtime     levETIRAcetam (KEPPRA) 500 MG tablet Take 500 mg by mouth 2 times daily     meclizine (ANTIVERT) 25 MG tablet Take 25 mg by mouth     MELATONIN PO Take 3 mg by mouth At Bedtime      METOPROLOL TARTRATE PO Take 100 mg by mouth 2 times daily      mupirocin (BACTROBAN) 2 % ointment Apply topically as needed     OMEPRAZOLE PO Take 20 mg by mouth daily      ondansetron (ZOFRAN-ODT) 4 MG ODT tab Take 4 mg by mouth     POTASSIUM CHLORIDE PO Take 40 mEq by mouth daily     PRAVASTATIN SODIUM PO Take 40 mg by mouth every evening     senna-docusate (SENOKOT-S;PERICOLACE) 8.6-50 MG per tablet Take 1 tablet by mouth 2 times daily as needed      timolol (TIMOPTIC) 0.5 % ophthalmic solution Place 1 drop into both eyes 2 times daily Hold if HR <60     torsemide (DEMADEX) 20 MG tablet Take 20 mg by mouth     TRAMADOL HCL PO Take 50 mg by mouth every 8 hours as needed for moderate to severe pain     Warfarin Sodium (COUMADIN PO) Take as directed by INR current dose 5mg PO Mon and Thur. And 3mg PO Tues, Wed, Fri, Sat, Sun next INR 1/2/18     amiodarone  "(PACERONE/CODARONE) 200 MG tablet Take 1 tablet (200 mg) by mouth daily (Patient not taking: Reported on 1/11/2019)     AMLODIPINE BESYLATE PO Take 10 mg by mouth daily      clotrimazole (LOTRIMIN) 1 % cream Apply topically 2 times daily as needed     DULoxetine (CYMBALTA) 60 MG EC capsule Take 60 mg by mouth 2 times daily     ferrous sulfate (IRON) 325 (65 Fe) MG tablet Take 325 mg by mouth every other day     fluticasone (FLOVENT DISKUS) 50 MCG/BLIST AEPB Inhale 2 puffs into the lungs 2 times daily as needed     FOLIC ACID PO Take 1 mg by mouth daily     FUROSEMIDE PO Take 40 mg by mouth 2 times daily     GABAPENTIN PO Take 300 mg by mouth 3 times daily      hydrocortisone (CORTAID) 1 % cream Apply topically 2 times daily as needed     nitroglycerin (NITROSTAT) 0.4 MG sublingual tablet Place 0.4 mg under the tongue every 5 minutes as needed for chest pain For chest pain place 1 tablet under the tongue every 5 minutes for 3 doses. If symptoms persist 5 minutes after 1st dose call 911.     triamcinolone (KENALOG) 0.1 % cream Apply topically 2 times daily     No current facility-administered medications for this visit.          Social History   See HPI    Family History   Son: Inflammatory arthritis    Physical Exam     Temp Readings from Last 3 Encounters:   07/24/18 97.1  F (36.2  C)   04/25/18 96.7  F (35.9  C)   03/06/18 97.2  F (36.2  C)     BP Readings from Last 5 Encounters:   01/11/19 134/70   08/30/18 145/76   07/24/18 140/82   04/25/18 150/72   03/06/18 144/90     Pulse Readings from Last 1 Encounters:   01/11/19 61     Resp Readings from Last 1 Encounters:   08/30/18 16     Estimated body mass index is 30.85 kg/m  as calculated from the following:    Height as of this encounter: 1.651 m (5' 5\").    Weight as of this encounter: 84.1 kg (185 lb 6.4 oz).    GEN: NAD  HEENT: MMM. No oral lesions. Anicteric, noninjected sclera  CV: S1, S2. RRR. No m/r/g.  PULM: CTA bilaterally. No w/c.  Using supplemental " oxygen via nasal cannula  ABD: +BS.  MSK: Synovial swelling and tenderness palpation of the bilateral second-third MCPs.  PIPs diffusely tender to palpation but without swelling.  Wrists, elbows, shoulders, ankles, and MTPs without swelling or tenderness to palpation.  Hips nontender to palpation.  Knees with medial joint line tenderness but no effusion or increased warmth; crepitation bilaterally.  NEURO: UE and LE strengths 5/5 and equal bilaterally.   SKIN: No rash  EXT: No LE edema  PSYCH: Alert. Appropriate.    Labs / Imaging (select studies)     CBC  Recent Labs   Lab Test 05/14/18 08/16/17 01/03/17   WBC  --   --  7.7   RBC  --   --  3.45*   HGB 9.7* 9.2* 10.4*   HCT  --   --  31.6*   MCV  --   --  92   RDW  --   --  14.9*   PLT  --   --  203   MCH  --   --  30   MCHC  --   --  33     CMP  Recent Labs   Lab Test 09/05/18 04/30/18 01/22/18 08/16/17 08/09/17 08/06/17 08/04/17    141 139 144 140 137  137 138  138   POTASSIUM 3.1* 3.5 4.0 3.4* 3.7 4.4  4.4 4.2  4.2   CHLORIDE 95* 104 104  --  109* 106  106 108*  108*   CO2 33* 29 27  --  23 21*  21* 22  22   ANIONGAP 11 8 8  --  8 10  10 8  8   * 101 203*  --  73 193*  193* 233*  233*   BUN 26 20 23  --  24 25  25 30*  30*   CR 1.21* 1.10 1.20* 1.07 1.22* 1.37*  1.37* 1.34*  1.34*   GFRESTIMATED 43* 48* 44* 50* 43* 38*  38* 39*  38*   GFRESTBLACK 62* 59* 53* >60 52* 46*  46* 47*  47*   DION 9.3 8.9 8.9  --  8.6 8.8  8.8 8.7  8.7   BILITOTAL  --   --   --   --   --   --  0.4  0.4   ALBUMIN  --   --   --   --   --   --  3.1*  3.1*   PROTTOTAL  --   --   --   --   --   --  6.1  6.1   ALKPHOS  --   --   --   --   --   --  146*  146*   AST  --   --   --   --   --   --  24  24   ALT  --   --   --   --   --   --  71*  71*     HgA1c  Recent Labs   Lab Test 07/25/18 04/30/18   A1C 7.8* 10.0*     Calcium/VitaminD  Recent Labs   Lab Test 09/05/18 04/30/18 01/22/18   DION 9.3 8.9 8.9       Immunization History     Immunization History    Administered Date(s) Administered     Influenza (H1N1) 12/15/2009     Influenza (High Dose) 3 valent vaccine 10/27/2003, 10/25/2005, 10/30/2006, 10/08/2007, 10/01/2008, 09/30/2009, 10/14/2014, 10/30/2015     Influenza (IIV3) PF 10/27/2003, 10/25/2005, 10/30/2006, 10/08/2007, 10/01/2008, 09/30/2009, 09/29/2010, 10/10/2011, 09/18/2013, 10/14/2014, 10/30/2015, 10/13/2016     Pneumo Conj 13-V (2010&after) 05/18/2015     Pneumococcal 23 valent 11/01/2000, 10/30/2006, 09/29/2010          Chart documentation done in part with Dragon Voice recognition Software. Although reviewed after completion, some word and grammatical error may remain.    Jim Hunter MD

## 2019-01-11 NOTE — NURSING NOTE
RAPID3 (0-30) Cumulative Score  22.0          RAPID3 Weighted Score (divide #4 by 3 and that is the weighted score)  7.33     Linnette Proctor [facility] Rheumatology  1/11/2019 9:39 AM

## 2019-01-12 LAB
CARDIOLIPIN ANTIBODY IGG: <1.6 GPL-U/ML (ref 0–19.9)
CARDIOLIPIN ANTIBODY IGM: <0.2 MPL-U/ML (ref 0–19.9)
ENA RNP IGG SER IA-ACNC: <0.2 AI (ref 0–0.9)
ENA SCL70 IGG SER IA-ACNC: <0.2 AI (ref 0–0.9)
ENA SM IGG SER-ACNC: <0.2 AI (ref 0–0.9)
ENA SS-A IGG SER IA-ACNC: 1.7 AI (ref 0–0.9)
ENA SS-B IGG SER IA-ACNC: <0.2 AI (ref 0–0.9)

## 2019-01-13 LAB
BACTERIA SPEC CULT: ABNORMAL
BACTERIA SPEC CULT: ABNORMAL
GAMMA INTERFERON BACKGROUND BLD IA-ACNC: 0.04 IU/ML
M TB IFN-G BLD-IMP: NEGATIVE
M TB IFN-G CD4+ BCKGRND COR BLD-ACNC: 4.23 IU/ML
MITOGEN IGNF BCKGRD COR BLD-ACNC: 0 IU/ML
MITOGEN IGNF BCKGRD COR BLD-ACNC: 0.01 IU/ML
SPECIMEN SOURCE: ABNORMAL

## 2019-01-14 LAB
ANA PAT SER IF-IMP: ABNORMAL
ANA SER QL IF: POSITIVE
ANA TITR SER IF: ABNORMAL {TITER}
B2 GLYCOPROT1 IGG SERPL IA-ACNC: 1.4 U/ML
B2 GLYCOPROT1 IGM SERPL IA-ACNC: 0.9 U/ML
CCP AB SER IA-ACNC: 4 U/ML
DSDNA AB SER-ACNC: 6 IU/ML

## 2019-01-15 NOTE — NURSING NOTE
Clinic notes faxed to Dr. Ga Allen at [facility] in [place] to fax number [phone].  Linnette Proctor CMA Rheumatology  1/15/2019 12:19 PM

## 2019-01-22 ENCOUNTER — RECORDS - HEALTHEAST (OUTPATIENT)
Dept: LAB | Facility: CLINIC | Age: 79
End: 2019-01-22

## 2019-01-23 LAB — INR PPP: 1.72 (ref 0.9–1.1)

## 2019-01-25 DIAGNOSIS — M19.90 INFLAMMATORY ARTHRITIS: Primary | ICD-10-CM

## 2019-01-25 DIAGNOSIS — Z79.899 HIGH RISK MEDICATION USE: ICD-10-CM

## 2019-01-25 RX ORDER — LEFLUNOMIDE 20 MG/1
20 TABLET ORAL DAILY
Qty: 30 TABLET | Refills: 3 | Status: SHIPPED | OUTPATIENT
Start: 2019-01-25 | End: 2019-01-29

## 2019-01-25 NOTE — RESULT ENCOUNTER NOTE
Rheumatology team: Please call to notify Ms. Cruz that labs show a positive VIVEK and SSA, but otherwise do not suggest increased connective tissue disease activity.  Leflunomide has been prescribed to the [facility] pharmacy in [place] to treat the inflammatory arthritis; labs will be needed monthly for the next 6 months to monitor for leflunomide toxicity and can be done at any [place] lab.    Jim Hunter MD  1/25/2019 4:11 PM

## 2019-01-28 ENCOUNTER — TELEPHONE (OUTPATIENT)
Dept: RHEUMATOLOGY | Facility: CLINIC | Age: 79
End: 2019-01-28

## 2019-01-28 DIAGNOSIS — M19.90 INFLAMMATORY ARTHRITIS: ICD-10-CM

## 2019-01-28 NOTE — TELEPHONE ENCOUNTER
Left vm for pt to return call to notify of Dr. Hunter's result note below.    Rheumatology team: Please call to notify Ms. Cruz that labs show a positive VIVEK and SSA, but otherwise do not suggest increased connective tissue disease activity.  Leflunomide has been prescribed to the [facility] pharmacy in [place] to treat the inflammatory arthritis; labs will be needed monthly for the next 6 months to monitor for leflunomide toxicity and can be done at any [place] lab.     MD Lul Stacy RN....1/28/2019 12:06 PM

## 2019-01-29 ENCOUNTER — RECORDS - HEALTHEAST (OUTPATIENT)
Dept: LAB | Facility: CLINIC | Age: 79
End: 2019-01-29

## 2019-01-29 RX ORDER — LEFLUNOMIDE 20 MG/1
20 TABLET ORAL DAILY
Qty: 30 TABLET | Refills: 3 | Status: SHIPPED | OUTPATIENT
Start: 2019-01-29 | End: 2019-07-25

## 2019-01-29 NOTE — TELEPHONE ENCOUNTER
Second attempt to reach patient's daughter was unsuccessful. Left vm for her to return call to [phone].    Lul Hdz RN....1/29/2019 2:10 PM

## 2019-01-29 NOTE — TELEPHONE ENCOUNTER
Patient's daughter Maria called back and was informed of Dr. Hunter's message below. She requested for the leflunomide [facility] to [facility]. Cancelled prescription at previous pharmacy and sent to new pharmacy. Also faxed lab orders to [facility] [place] per daughter's request, f: [phone].    Lul Hdz RN....1/29/2019 2:51 PM

## 2019-01-31 LAB — INR PPP: 2.74 (ref 0.9–1.1)

## 2019-02-06 ENCOUNTER — RECORDS - HEALTHEAST (OUTPATIENT)
Dept: LAB | Facility: CLINIC | Age: 79
End: 2019-02-06

## 2019-02-07 LAB — INR PPP: 2.25 (ref 0.9–1.1)

## 2019-02-22 ENCOUNTER — RECORDS - HEALTHEAST (OUTPATIENT)
Dept: LAB | Facility: CLINIC | Age: 79
End: 2019-02-22

## 2019-02-22 LAB — INR PPP: 1.72 (ref 0.9–1.1)

## 2019-02-25 LAB
ALBUMIN SERPL-MCNC: 3.8 G/DL (ref 3.5–5)
ALP SERPL-CCNC: 126 U/L (ref 45–120)
ALT SERPL W P-5'-P-CCNC: 19 U/L (ref 0–45)
AST SERPL W P-5'-P-CCNC: 30 U/L (ref 0–40)
BILIRUB DIRECT SERPL-MCNC: 0.1 MG/DL
BILIRUB SERPL-MCNC: 0.4 MG/DL (ref 0–1)
PROT SERPL-MCNC: 7.8 G/DL (ref 6–8)

## 2019-03-01 ENCOUNTER — RECORDS - HEALTHEAST (OUTPATIENT)
Dept: LAB | Facility: CLINIC | Age: 79
End: 2019-03-01

## 2019-03-01 LAB — INR PPP: 1.82 (ref 0.9–1.1)

## 2019-03-05 ENCOUNTER — RECORDS - HEALTHEAST (OUTPATIENT)
Dept: LAB | Facility: CLINIC | Age: 79
End: 2019-03-05

## 2019-03-06 LAB — INR PPP: 1.79 (ref 0.9–1.1)

## 2019-03-13 ENCOUNTER — RECORDS - HEALTHEAST (OUTPATIENT)
Dept: LAB | Facility: CLINIC | Age: 79
End: 2019-03-13

## 2019-03-13 LAB — INR PPP: 2.15 (ref 0.9–1.1)

## 2019-03-20 ENCOUNTER — RECORDS - HEALTHEAST (OUTPATIENT)
Dept: LAB | Facility: CLINIC | Age: 79
End: 2019-03-20

## 2019-03-20 LAB — INR PPP: 2.34 (ref 0.9–1.1)

## 2019-03-22 ENCOUNTER — RECORDS - HEALTHEAST (OUTPATIENT)
Dept: LAB | Facility: CLINIC | Age: 79
End: 2019-03-22

## 2019-03-25 ENCOUNTER — TRANSFERRED RECORDS (OUTPATIENT)
Dept: HEALTH INFORMATION MANAGEMENT | Facility: CLINIC | Age: 79
End: 2019-03-25

## 2019-03-25 LAB
ALBUMIN SERPL-MCNC: 3.5 G/DL (ref 3.5–5)
ALBUMIN SERPL-MCNC: 3.5 G/DL (ref 3.5–5)
ALP SERPL-CCNC: 110 U/L (ref 45–120)
ALP SERPL-CCNC: 110 U/L (ref 45–120)
ALT SERPL W P-5'-P-CCNC: 12 U/L (ref 0–45)
ALT SERPL-CCNC: 12 U/L (ref 0–45)
AST SERPL W P-5'-P-CCNC: 17 U/L (ref 0–40)
AST SERPL-CCNC: 17 U/L (ref 0–40)
BILIRUB DIRECT SERPL-MCNC: 0.1 MG/DL
BILIRUB SERPL-MCNC: 0.3 MG/DL (ref 0–1)
BILIRUB SERPL-MCNC: 0.3 MG/DL (ref 0–1)
BILIRUBIN DIRECT: 0.1 MG/DL
PROT SERPL-MCNC: 6.9 G/DL (ref 6–5)
PROT SERPL-MCNC: 6.9 G/DL (ref 6–8)

## 2019-03-27 ENCOUNTER — RECORDS - HEALTHEAST (OUTPATIENT)
Dept: LAB | Facility: CLINIC | Age: 79
End: 2019-03-27

## 2019-03-27 LAB — INR PPP: 2.68 (ref 0.9–1.1)

## 2019-04-09 ENCOUNTER — RECORDS - HEALTHEAST (OUTPATIENT)
Dept: LAB | Facility: CLINIC | Age: 79
End: 2019-04-09

## 2019-04-10 LAB — INR PPP: 2.36 (ref 0.9–1.1)

## 2019-04-17 ENCOUNTER — RECORDS - HEALTHEAST (OUTPATIENT)
Dept: LAB | Facility: CLINIC | Age: 79
End: 2019-04-17

## 2019-04-17 LAB — INR PPP: 1.14 (ref 0.9–1.1)

## 2019-04-18 ENCOUNTER — RECORDS - HEALTHEAST (OUTPATIENT)
Dept: LAB | Facility: CLINIC | Age: 79
End: 2019-04-18

## 2019-04-18 LAB — INR PPP: 1.21 (ref 0.9–1.1)

## 2019-04-19 ENCOUNTER — RECORDS - HEALTHEAST (OUTPATIENT)
Dept: LAB | Facility: CLINIC | Age: 79
End: 2019-04-19

## 2019-04-22 LAB
ALBUMIN SERPL-MCNC: 3 G/DL (ref 3.5–5)
ALP SERPL-CCNC: 96 U/L (ref 45–120)
ALT SERPL W P-5'-P-CCNC: <9 U/L (ref 0–45)
AST SERPL W P-5'-P-CCNC: 15 U/L (ref 0–40)
BILIRUB DIRECT SERPL-MCNC: 0.2 MG/DL
BILIRUB SERPL-MCNC: 0.4 MG/DL (ref 0–1)
INR PPP: 1.89 (ref 0.9–1.1)
PROT SERPL-MCNC: 6.8 G/DL (ref 6–8)

## 2019-04-26 ENCOUNTER — RECORDS - HEALTHEAST (OUTPATIENT)
Dept: LAB | Facility: CLINIC | Age: 79
End: 2019-04-26

## 2019-04-29 LAB — INR PPP: 5.53 (ref 0.9–1.1)

## 2019-04-30 ENCOUNTER — RECORDS - HEALTHEAST (OUTPATIENT)
Dept: LAB | Facility: CLINIC | Age: 79
End: 2019-04-30

## 2019-05-01 LAB — INR PPP: 1.91 (ref 0.9–1.1)

## 2019-05-07 NOTE — PROGRESS NOTES
[facility]  PRIMARY CARE PROVIDER AND CLINIC:  Manda Molina NP, [address] / [place] [address]  Chief Complaint   Patient presents with     Hospital F/U     TCU     [facility] Record Number:  [id number]  Place of Service where encounter took place:  [facility] ([facility]) [[id number]]    Diya Cruz  is a 79 year old  (1940), admitted to the above facility from  [facility] . Hospital stay 11/28/2017 through  12/04/2017..  Admitted to this facility for  rehab, medical management and nursing care.    HPI:    HPI information obtained from: facility chart records, patient report and [facility] chart review.   Brief Summary of Hospital Course: Presented to the hospital with acute encephalopathy relating to a reaction to baclofen. She was found to have E. Coli UTI and completed antibiotic treatment. Also presented in acute on chronic heart failure and was diuresed with IV Bumex then switched to oral torsemide. Also noted to have C. Difficile colitis and started Vancomycin treatment in hospital.   Updates on Status Since Skilled nursing Admission: None    CODE STATUS/ADVANCE DIRECTIVES DISCUSSION:   DNR / DNI  Patient's living condition: lives in an assisted living facility  ALLERGIES: Rosiglitazone; Ambien; Ambien [zolpidem]; Sulfa drugs; Azithromycin; Clindamycin; Codeine; Lisinopril; Nitrofurantoin; Simvastatin; and Triamterene-hctz [hydrochlorothiazide w/triamterene]  PAST MEDICAL HISTORY:  has a past medical history of Abdominal aortic stenosis, Anxiety, Basal cell carcinoma, Carotid stenosis, Cerebrovascular accident (CVA) (H) (8/19/2013), Claudication (H), Compression fracture, Depressive disorder, GI bleed, Glaucoma, Hyperlipidemia, Meniere syndrome, Obesity, Obstructive sleep apnea (adult) (pediatric), Peripheral vascular disease, unspecified (H), Pure hypercholesterolemia, Rheumatoid arthritis(714.0), Seizure disorder (H), Sjogren's disease (H), Type II or  unspecified type diabetes mellitus without mention of complication, not stated as uncontrolled, Unspecified cerebral artery occlusion with cerebral infarction, Unspecified epilepsy without mention of intractable epilepsy, and Unspecified essential hypertension.  PAST SURGICAL HISTORY:   has a past surgical history that includes tonsillectomy; Hysterectomy; GYN surgery; Breast surgery; Head and neck surgery; Cardiac surgery; Cardiac surgery; Abdomen surgery; vascular surgery; Phacoemulsification with standard intraocular lens implant (6/26/2014); cataract iol, rt/lt (2010); and Laser YAG capsulotomy (Bilateral, 8/27/2015).  FAMILY HISTORY: family history is not on file.  SOCIAL HISTORY:   reports that she has quit smoking. Her smoking use included cigarettes. She has a 30.00 pack-year smoking history. She has never used smokeless tobacco. She reports that she does not drink alcohol or use drugs.    Post Discharge Medication Reconciliation Status: discharge medications reconciled, continue medications without change    Current Outpatient Medications   Medication Sig Dispense Refill     acetaminophen (TYLENOL) 325 MG tablet Take 650 mg by mouth 2 times daily And 650 mg po tid       amiodarone (PACERONE/CODARONE) 200 MG tablet Take 200 mg by mouth daily       AMLODIPINE BESYLATE PO Take 10 mg by mouth daily        aspirin 81 MG EC tablet Take 81 mg by mouth daily       CRANBERRY-VIT C-LACTOBACILLUS PO Take 1 tablet by mouth daily       diclofenac (VOLTAREN) 1 % topical gel Place onto the skin 4 times daily       DULoxetine (CYMBALTA) 60 MG EC capsule Take 60 mg by mouth 2 times daily       ergocalciferol (ERGOCALCIFEROL) 93131 units capsule Take 50,000 Units by mouth once a week       ferrous sulfate (IRON) 325 (65 Fe) MG tablet Take 325 mg by mouth every other day       fluticasone (FLOVENT DISKUS) 50 MCG/BLIST AEPB Inhale 2 puffs into the lungs 2 times daily as needed       FOLIC ACID PO Take 1 mg by mouth daily        GABAPENTIN PO Take 300 mg by mouth 3 times daily        insulin aspart (NOVOLOG FLEXPEN) 100 UNIT/ML injection Inject 8 Units Subcutaneous 2 times daily (with meals) Give with lunch and dinner       insulin glargine (LANTUS) 100 UNIT/ML injection Inject 15 Units Subcutaneous At Bedtime        latanoprost (XALATAN) 0.005 % ophthalmic solution Place 1 drop into both eyes At Bedtime       leflunomide (ARAVA) 20 MG tablet Take 1 tablet (20 mg) by mouth daily . Labs needed monthly 30 tablet 3     levETIRAcetam (KEPPRA) 500 MG tablet Take 500 mg by mouth 2 times daily       loperamide (IMODIUM A-D) 2 MG tablet Take 2 mg by mouth every 2 hours as needed for diarrhea       meclizine (ANTIVERT) 25 MG tablet Take 25 mg by mouth       MELATONIN PO Take 3 mg by mouth At Bedtime        menthol (ICY HOT) 5 % PTCH Apply 1 patch topically every 8 hours as needed for muscle soreness       METOPROLOL TARTRATE PO Take 100 mg by mouth 2 times daily        mupirocin (BACTROBAN) 2 % ointment Apply topically as needed       nitroglycerin (NITROSTAT) 0.4 MG sublingual tablet Place 0.4 mg under the tongue every 5 minutes as needed for chest pain For chest pain place 1 tablet under the tongue every 5 minutes for 3 doses. If symptoms persist 5 minutes after 1st dose call 911.       nystatin (MYCOSTATIN) 597168 UNIT/GM external powder Apply topically 2 times daily       OMEPRAZOLE PO Take 20 mg by mouth daily        ondansetron (ZOFRAN-ODT) 4 MG ODT tab Take 4 mg by mouth       POTASSIUM CHLORIDE PO Take 40 mEq by mouth daily       PRAVASTATIN SODIUM PO Take 40 mg by mouth every evening       senna-docusate (SENOKOT-S;PERICOLACE) 8.6-50 MG per tablet Take 1 tablet by mouth 2 times daily as needed        timolol (TIMOPTIC) 0.5 % ophthalmic solution Place 1 drop into both eyes 2 times daily Hold if HR <60       torsemide (DEMADEX) 20 MG tablet Take 20 mg by mouth       triamcinolone (KENALOG) 0.1 % cream Apply topically 2 times daily        vancomycin (FIRVANQ) 25 MG/ML oral solution Take by mouth 4 times daily 5 ml po qid       acetaminophen (TYLENOL) 325 MG tablet Take 650 mg by mouth every 6 hours as needed for pain        amiodarone (PACERONE/CODARONE) 200 MG tablet Take 1 tablet (200 mg) by mouth daily (Patient not taking: Reported on 1/11/2019) 30 tablet 0     clotrimazole (LOTRIMIN) 1 % cream Apply topically 2 times daily as needed       FUROSEMIDE PO Take 40 mg by mouth 2 times daily       hydrocortisone (CORTAID) 1 % cream Apply topically 2 times daily as needed       TRAMADOL HCL PO Take 50 mg by mouth every 8 hours as needed for moderate to severe pain       Warfarin Sodium (COUMADIN PO) Take as directed by INR current dose 5mg PO Mon and Thur. And 3mg PO Tues, Wed, Fri, Sat, Sun next INR 1/2/18         ROS:  General: Good appetite, sleeping well. Lightheadedness after ambulation to bathroom this morning.  Respiratory: Shortness of breath with ambulation to bathroom this morning, related to moving to quickly.   Cardiovascular: Pacemaker. Denies chest pain, pressure.   Peripheral neurovascular: Bilateral feet with numbness and tingling.   Gastrointestinal: Fecal incontinence, loose bowel movements. Abdominal cramping associated with bowel movement. Denies nausea.  Genitourinary: Denies dysuria, incontinence, frequency.  Integumentary: Denies rash, dermatitis.  Musculoskeletal: Arthritis in bilateral feet.   Psychiatric: Memory loss from before hospitalization and during hospitalization.     Vitals:  /62   Pulse 59   Temp 98.3  F (36.8  C)   Resp 18   Wt 77.1 kg (170 lb)   SpO2 93%   BMI 28.29 kg/m    Exam:  GENERAL APPEARANCE:  Alert, in no distress, cooperative  RESP:  respiratory effort and palpation of chest normal, no respiratory distress, crackles fine bilateral lobes  CV:  Palpation and auscultation of heart done, peripheral edema 1+ in bilateral feet, grade 3/6 murmur  ABDOMEN:  bowel sounds hypoactive, abdomen obese, soft,  non-tender, nondistended  M/S:   Gait and station observed lying supine in bed. No obvious joint deformities.    SKIN:  Inspection of skin and subcutaneous tissue baseline, Palpation of skin and subcutaneous tissue baseline. Calloused toes bilaterally. Erythema left medial foot.   PSYCH:  oriented to time, situation, memory impaired, affect and mood normal    ASSESSMENT/PLAN:  Chronic diastolic congestive heart  failure (H)  Presented to the hospital with acute on chronic CHF. Diuresed with IV Bumex then transitioned to oral torsemide.  -Continue torsemide, 2L Fluid restriction     Hx of encephalopathy  Presented to the hospital for acute encephalopathy were reaction to Baclofen was suspected and E.coli UTI. Admitted to this facility for physical and occupational therapy due to confusion and subsequent risk for falls. Confusion improving.   -Continue physical and occupational therapy     Clostridium difficile infection  Noted to have Cdifficile infection in hospital and was started on oral vancomycin.  -Continue Vancomycin QID through 5/18/19    Type 2 diabetes mellitus with stage 3 chronic kidney disease, with long-term current use of insulin (H)  Diabetes managed with lantus and novolog. With IV diuresis in hospital and antibiotics, continue to monitor kidney function.  -BMP check on 5/10/19    Chronic atrial fibrillation (H)   Rate controlled with amiodarone and metoprolol. Anticoagulated with Warfarin. Today INR 2.4.   -Warfarin 2.5 mg PO   -Recheck INR 5/10/19    Mild episode of recurrent major depressive disorder (H)  Managed with Cymbalta.     Seizure disorder (H)  Chronic. Stable. Managed with Keppra.     1. Warfarin 2.5 mg PO   2. Check INR 5/10/19    Total time spent with patient visit at the skilled nursing facility was 35 min including patient visit and review of past records. Greater than 50% of total time spent with counseling and coordinating care due to dx     Electronically signed by:  Krys Clarke  SNP    Provider Disclosure:  This patient was seen along with a nurse practitioner student. The histories and reviews of systems were obtained by them and confirmed by myself. All objective, assessments and plans were completed by myself.    Electronically signed by:  Manda Molina NP

## 2019-05-08 ENCOUNTER — NURSING HOME VISIT (OUTPATIENT)
Dept: GERIATRICS | Facility: CLINIC | Age: 79
End: 2019-05-08
Payer: MEDICARE

## 2019-05-08 VITALS
TEMPERATURE: 98.3 F | SYSTOLIC BLOOD PRESSURE: 150 MMHG | BODY MASS INDEX: 28.29 KG/M2 | WEIGHT: 170 LBS | HEART RATE: 59 BPM | DIASTOLIC BLOOD PRESSURE: 62 MMHG | OXYGEN SATURATION: 93 % | RESPIRATION RATE: 18 BRPM

## 2019-05-08 DIAGNOSIS — G40.909 SEIZURE DISORDER (H): ICD-10-CM

## 2019-05-08 DIAGNOSIS — Z86.69 HX OF ENCEPHALOPATHY: ICD-10-CM

## 2019-05-08 DIAGNOSIS — I50.32 CHRONIC DIASTOLIC CONGESTIVE HEART FAILURE (H): Primary | ICD-10-CM

## 2019-05-08 DIAGNOSIS — Z79.4 TYPE 2 DIABETES MELLITUS WITH STAGE 3 CHRONIC KIDNEY DISEASE, WITH LONG-TERM CURRENT USE OF INSULIN (H): ICD-10-CM

## 2019-05-08 DIAGNOSIS — E11.22 TYPE 2 DIABETES MELLITUS WITH STAGE 3 CHRONIC KIDNEY DISEASE, WITH LONG-TERM CURRENT USE OF INSULIN (H): ICD-10-CM

## 2019-05-08 DIAGNOSIS — I48.20 CHRONIC ATRIAL FIBRILLATION (H): ICD-10-CM

## 2019-05-08 DIAGNOSIS — N18.30 TYPE 2 DIABETES MELLITUS WITH STAGE 3 CHRONIC KIDNEY DISEASE, WITH LONG-TERM CURRENT USE OF INSULIN (H): ICD-10-CM

## 2019-05-08 DIAGNOSIS — A49.8 CLOSTRIDIUM DIFFICILE INFECTION: ICD-10-CM

## 2019-05-08 DIAGNOSIS — F33.0 MILD EPISODE OF RECURRENT MAJOR DEPRESSIVE DISORDER (H): ICD-10-CM

## 2019-05-08 PROBLEM — G93.40 ACUTE ENCEPHALOPATHY: Status: ACTIVE | Noted: 2019-05-08

## 2019-05-08 PROCEDURE — 99310 SBSQ NF CARE HIGH MDM 45: CPT | Performed by: NURSE PRACTITIONER

## 2019-05-08 RX ORDER — ASPIRIN 81 MG/1
81 TABLET ORAL DAILY
COMMUNITY

## 2019-05-08 RX ORDER — LOPERAMIDE HYDROCHLORIDE 2 MG/1
2 TABLET ORAL
COMMUNITY

## 2019-05-08 RX ORDER — AMIODARONE HYDROCHLORIDE 200 MG/1
200 TABLET ORAL DAILY
COMMUNITY
End: 2019-12-02

## 2019-05-08 RX ORDER — NYSTATIN 100000 [USP'U]/G
POWDER TOPICAL 2 TIMES DAILY PRN
COMMUNITY

## 2019-05-08 RX ORDER — VANCOMYCIN HYDROCHLORIDE 25 MG/ML
KIT ORAL 4 TIMES DAILY
COMMUNITY
Start: 2019-05-07 | End: 2019-12-02

## 2019-05-08 RX ORDER — ERGOCALCIFEROL 1.25 MG/1
50000 CAPSULE ORAL WEEKLY
COMMUNITY

## 2019-05-08 RX ORDER — ACETAMINOPHEN 325 MG/1
650 TABLET ORAL 3 TIMES DAILY
COMMUNITY

## 2019-05-09 ENCOUNTER — RECORDS - HEALTHEAST (OUTPATIENT)
Dept: LAB | Facility: CLINIC | Age: 79
End: 2019-05-09

## 2019-05-10 ENCOUNTER — TRANSFERRED RECORDS (OUTPATIENT)
Dept: HEALTH INFORMATION MANAGEMENT | Facility: CLINIC | Age: 79
End: 2019-05-10

## 2019-05-10 ENCOUNTER — RECORDS - HEALTHEAST (OUTPATIENT)
Dept: LAB | Facility: CLINIC | Age: 79
End: 2019-05-10

## 2019-05-10 LAB
ANION GAP SERPL CALCULATED.3IONS-SCNC: 11 MMOL/L (ref 5–18)
ANION GAP SERPL CALCULATED.3IONS-SCNC: 11 MMOL/L (ref 5–18)
BUN SERPL-MCNC: 20 MG/DL (ref 8–28)
BUN SERPL-MCNC: 20 MG/DL (ref 8–28)
CALCIUM SERPL-MCNC: 9.5 MG/DL (ref 8.5–10.5)
CALCIUM SERPL-MCNC: 9.5 MG/DL (ref 8.5–10.5)
CHLORIDE BLD-SCNC: 102 MMOL/L (ref 98–107)
CHLORIDE SERPLBLD-SCNC: 102 MMOL/L (ref 98–107)
CO2 SERPL-SCNC: 29 MMOL/L (ref 22–31)
CO2 SERPL-SCNC: 29 MMOL/L (ref 22–31)
CREAT SERPL-MCNC: 1.16 MG/DL (ref 0.6–1.1)
CREAT SERPL-MCNC: 1.16 MG/DL (ref 0.6–1.1)
GFR SERPL CREATININE-BSD FRML MDRD: 45 ML/MIN/1.73M2
GFR SERPL CREATININE-BSD FRML MDRD: 45 ML/MIN/1.73M2
GLUCOSE BLD-MCNC: 143 MG/DL (ref 70–125)
GLUCOSE SERPL-MCNC: 143 MG/DL (ref 70–125)
POTASSIUM BLD-SCNC: 3.2 MMOL/L (ref 3.5–5)
POTASSIUM SERPL-SCNC: 3.2 MMOL/L (ref 3.5–5)
SODIUM SERPL-SCNC: 142 MMOL/L (ref 136–145)
SODIUM SERPL-SCNC: 142 MMOL/L (ref 136–145)

## 2019-05-13 ENCOUNTER — TRANSFERRED RECORDS (OUTPATIENT)
Dept: HEALTH INFORMATION MANAGEMENT | Facility: CLINIC | Age: 79
End: 2019-05-13

## 2019-05-13 ENCOUNTER — NURSING HOME VISIT (OUTPATIENT)
Dept: GERIATRICS | Facility: CLINIC | Age: 79
End: 2019-05-13
Payer: MEDICARE

## 2019-05-13 VITALS
RESPIRATION RATE: 18 BRPM | DIASTOLIC BLOOD PRESSURE: 84 MMHG | BODY MASS INDEX: 28.29 KG/M2 | TEMPERATURE: 97.7 F | HEART RATE: 60 BPM | SYSTOLIC BLOOD PRESSURE: 174 MMHG | WEIGHT: 170 LBS | OXYGEN SATURATION: 96 %

## 2019-05-13 DIAGNOSIS — E11.22 TYPE 2 DIABETES MELLITUS WITH STAGE 3 CHRONIC KIDNEY DISEASE, WITH LONG-TERM CURRENT USE OF INSULIN (H): ICD-10-CM

## 2019-05-13 DIAGNOSIS — I48.20 CHRONIC ATRIAL FIBRILLATION (H): ICD-10-CM

## 2019-05-13 DIAGNOSIS — I50.32 CHRONIC DIASTOLIC CONGESTIVE HEART FAILURE (H): Primary | ICD-10-CM

## 2019-05-13 DIAGNOSIS — N18.30 TYPE 2 DIABETES MELLITUS WITH STAGE 3 CHRONIC KIDNEY DISEASE, WITH LONG-TERM CURRENT USE OF INSULIN (H): ICD-10-CM

## 2019-05-13 DIAGNOSIS — M54.5 CHRONIC LOW BACK PAIN, UNSPECIFIED BACK PAIN LATERALITY, WITH SCIATICA PRESENCE UNSPECIFIED: ICD-10-CM

## 2019-05-13 DIAGNOSIS — M06.9 RHEUMATOID ARTHRITIS, INVOLVING UNSPECIFIED SITE, UNSPECIFIED RHEUMATOID FACTOR PRESENCE: ICD-10-CM

## 2019-05-13 DIAGNOSIS — Z79.4 TYPE 2 DIABETES MELLITUS WITH STAGE 3 CHRONIC KIDNEY DISEASE, WITH LONG-TERM CURRENT USE OF INSULIN (H): ICD-10-CM

## 2019-05-13 DIAGNOSIS — G89.29 CHRONIC LOW BACK PAIN, UNSPECIFIED BACK PAIN LATERALITY, WITH SCIATICA PRESENCE UNSPECIFIED: ICD-10-CM

## 2019-05-13 DIAGNOSIS — R53.81 PHYSICAL DECONDITIONING: ICD-10-CM

## 2019-05-13 DIAGNOSIS — A49.8 CLOSTRIDIUM DIFFICILE INFECTION: ICD-10-CM

## 2019-05-13 DIAGNOSIS — I77.9 PERIPHERAL ARTERIAL OCCLUSIVE DISEASE (H): ICD-10-CM

## 2019-05-13 DIAGNOSIS — Z86.69 HX OF ENCEPHALOPATHY: ICD-10-CM

## 2019-05-13 LAB
POTASSIUM BLD-SCNC: 3.8 MMOL/L (ref 3.5–5)
POTASSIUM SERPL-SCNC: 3.8 MMOL/L (ref 3.5–5)

## 2019-05-13 PROCEDURE — 99305 1ST NF CARE MODERATE MDM 35: CPT | Performed by: FAMILY MEDICINE

## 2019-05-13 RX ORDER — TRAMADOL HYDROCHLORIDE 50 MG/1
50 TABLET ORAL 2 TIMES DAILY PRN
Qty: 30 TABLET | Refills: 0 | Status: SHIPPED | OUTPATIENT
Start: 2019-05-13 | End: 2019-12-02

## 2019-05-14 PROBLEM — L98.9 SKIN PROBLEM: Status: RESOLVED | Noted: 2018-01-16 | Resolved: 2019-05-14

## 2019-05-14 PROBLEM — S42.412D CLOSED SUPRACONDYLAR FRACTURE OF LEFT HUMERUS WITH ROUTINE HEALING, SUBSEQUENT ENCOUNTER: Status: RESOLVED | Noted: 2017-12-08 | Resolved: 2019-05-14

## 2019-05-14 ASSESSMENT — ENCOUNTER SYMPTOMS
ABDOMINAL PAIN: 0
CONSTIPATION: 0
DIZZINESS: 0
EYE PAIN: 0
SORE THROAT: 0
FEVER: 0
APPETITE CHANGE: 0
HEARTBURN: 0
SHORTNESS OF BREATH: 0
HEADACHES: 0
COUGH: 0
DIARRHEA: 1
FREQUENCY: 0
NAUSEA: 0

## 2019-05-14 NOTE — ASSESSMENT & PLAN NOTE
Presented to the hospital with acute on chronic CHF. Diuresed with IV Bumex then transitioned to oral torsemide.  -Continue torsemide, 2L Fluid restriction

## 2019-05-14 NOTE — PROGRESS NOTES
HISTORY OF PRESENT ILLNESS:  Diya is a 79 year old female, (1940), admitted to [facility] from  [facility] . Hospital stay 11/28/2017 through  12/04/2017..  Admitted to this facility for  rehab, medical management and nursing care.     HPI:    HPI information obtained from: facility chart records, patient report and [facility] chart review.   Brief Summary of Hospital Course: Presented to the hospital with acute encephalopathy relating to a reaction to baclofen. She was found to have E. Coli UTI and completed antibiotic treatment. Also presented in acute on chronic heart failure and was diuresed with IV Bumex then switched to oral torsemide. Also noted to have C. Difficile colitis and started Vancomycin treatment in hospital.       Past Medical History/  Patient Active Problem List    Diagnosis Date Noted     Hx of encephalopathy 05/08/2019     Priority: Medium     Clostridium difficile infection 05/08/2019     Priority: Medium     Pulmonary nodules 03/07/2018     Priority: Medium     Advanced directives, counseling/discussion 12/14/2017     Priority: Medium     Falls frequently 12/14/2017     Priority: Medium     Cognitive impairment 12/08/2017     Priority: Medium     Hx of seizure disorder 12/08/2017     Priority: Medium     Elevated LFTs 07/31/2017     Priority: Medium     Physical deconditioning 07/31/2017     Priority: Medium     Chronic atrial fibrillation (H) 07/31/2017     Priority: Medium     Benign essential hypertension 07/31/2017     Priority: Medium     Chronic diastolic congestive heart failure (H) 07/31/2017     Priority: Medium     CKD (chronic kidney disease) stage 3, GFR 30-59 ml/min (H) 07/31/2017     Priority: Medium     Anemia, unspecified type 07/31/2017     Priority: Medium     Sick sinus syndrome (H) 01/13/2017     Priority: Medium     Nondiabetic proliferative retinopathy 08/08/2016     Priority: Medium     Chronic back pain 03/29/2016     Priority: Medium      Multiple-type hyperlipidemia 01/26/2016     Priority: Medium     Mass of breast 07/23/2015     Priority: Medium     Fatigue 07/22/2015     Priority: Medium     Lung nodule < 6cm on CT 07/10/2015     Priority: Medium     Overview:   Lung nodules. Following with Dr. Pennington, due for repeat CT 1/2016       Stenosis of carotid artery 06/12/2015     Priority: Medium     Candidal intertrigo 04/10/2015     Priority: Medium     Microalbuminuria 01/26/2015     Priority: Medium     Mild episode of recurrent major depressive disorder (H) 01/26/2015     Priority: Medium     Type 2 diabetes mellitus (H) 01/26/2015     Priority: Medium     Spinal stenosis of lumbar region 01/23/2015     Priority: Medium     History of adenomatous polyp of colon 11/22/2013     Priority: Medium     History of endovascular stent graft for abdominal aortic aneurysm 11/22/2013     Priority: Medium     Anxiety 10/22/2013     Priority: Medium     Seasonal allergic rhinitis 09/03/2013     Priority: Medium     Seizure disorder (H) 09/03/2013     Priority: Medium     Overview:   Possible reaction to Remicade, takes Keppra       Glaucoma 08/19/2013     Priority: Medium     Hypoferremia 08/19/2013     Priority: Medium     Overview:   Source in distal small bowel per patient (identified on pill cam). History transfusions.       Obstructive sleep apnea syndrome 08/19/2013     Priority: Medium     Overview:   Unable to tolerate CPAP       Peripheral arterial occlusive disease (H) 08/19/2013     Priority: Medium     Overview:   8/8/01 - 18-mm infrarenal aortic stent with angioplasty   8/23/01, Angioplasty Rt prox SFA, thrombolysis Rt CFA and SFA.  7/14/11 - bilateral GLENN stents       Rheumatoid arthritis (H) 08/19/2013     Priority: Medium     Overview:   Dr. Trevino         Past Surgical History:   Procedure Laterality Date     ABDOMEN SURGERY      AAA Repair, abdominal aorta and illiac stents placed     BREAST SURGERY      Breast Biopsy     CARDIAC SURGERY       Aortic Stent Graft     CARDIAC SURGERY      Cardiac Catheterization     CATARACT IOL, RT/LT  2010    Right     GYN SURGERY      Hysterectomy     HEAD & NECK SURGERY      Tonsillectomy     HYSTERECTOMY      age 41     LASER YAG CAPSULOTOMY Bilateral 8/27/2015    Procedure: LASER YAG CAPSULOTOMY;  Surgeon: Gus Gregorio MD;  Location: [facility] OR     PHACOEMULSIFICATION WITH STANDARD INTRAOCULAR LENS IMPLANT  6/26/2014    Procedure: PHACOEMULSIFICATION WITH STANDARD INTRAOCULAR LENS IMPLANT;  Surgeon: Gus Gregorio MD;  Location: [facility] OR     TONSILLECTOMY      age 22     VASCULAR SURGERY      Angio extremity bilateral       History reviewed. No pertinent family history.        Social History     Socioeconomic History     Marital status:      Spouse name: Not on file     Number of children: Not on file     Years of education: Not on file     Highest education level: Not on file   Occupational History     Not on file   Social Needs     Financial resource strain: Not on file     Food insecurity:     Worry: Not on file     Inability: Not on file     Transportation needs:     Medical: Not on file     Non-medical: Not on file   Tobacco Use     Smoking status: Former Smoker     Packs/day: 1.00     Years: 30.00     Pack years: 30.00     Types: Cigarettes     Smokeless tobacco: Never Used   Substance and Sexual Activity     Alcohol use: No     Drug use: No     Sexual activity: Never   Lifestyle     Physical activity:     Days per week: Not on file     Minutes per session: Not on file     Stress: Not on file   Relationships     Social connections:     Talks on phone: Not on file     Gets together: Not on file     Attends Orthodox service: Not on file     Active member of club or organization: Not on file     Attends meetings of clubs or organizations: Not on file     Relationship status: Not on file     Intimate partner violence:     Fear of current or ex partner: Not on file     Emotionally abused: Not on  file     Physically abused: Not on file     Forced sexual activity: Not on file   Other Topics Concern     Parent/sibling w/ CABG, MI or angioplasty before 65F 55M? Not Asked   Social History Narrative     Not on file        Current Outpatient Medications   Medication Sig     acetaminophen (TYLENOL) 325 MG tablet Take 650 mg by mouth 2 times daily And 650 mg po tid     acetaminophen (TYLENOL) 325 MG tablet Take 650 mg by mouth every 6 hours as needed for pain      amiodarone (PACERONE/CODARONE) 200 MG tablet Take 200 mg by mouth daily     amiodarone (PACERONE/CODARONE) 200 MG tablet Take 1 tablet (200 mg) by mouth daily     AMLODIPINE BESYLATE PO Take 10 mg by mouth daily      aspirin 81 MG EC tablet Take 81 mg by mouth daily     clotrimazole (LOTRIMIN) 1 % cream Apply topically 2 times daily as needed     CRANBERRY-VIT C-LACTOBACILLUS PO Take 1 tablet by mouth daily     diclofenac (VOLTAREN) 1 % topical gel Place onto the skin 4 times daily     DULoxetine (CYMBALTA) 60 MG EC capsule Take 60 mg by mouth 2 times daily     ergocalciferol (ERGOCALCIFEROL) 58289 units capsule Take 50,000 Units by mouth once a week     ferrous sulfate (IRON) 325 (65 Fe) MG tablet Take 325 mg by mouth every other day     fluticasone (FLOVENT DISKUS) 50 MCG/BLIST AEPB Inhale 2 puffs into the lungs 2 times daily as needed     FOLIC ACID PO Take 1 mg by mouth daily     FUROSEMIDE PO Take 40 mg by mouth 2 times daily     GABAPENTIN PO Take 300 mg by mouth 3 times daily      hydrocortisone (CORTAID) 1 % cream Apply topically 2 times daily as needed     insulin aspart (NOVOLOG FLEXPEN) 100 UNIT/ML injection Inject 8 Units Subcutaneous 2 times daily (with meals) Give with lunch and dinner     insulin glargine (LANTUS) 100 UNIT/ML injection Inject 15 Units Subcutaneous At Bedtime      Lactobacillus (PROBIOTIC ACIDOPHILUS PO) Take 2 tablets by mouth daily     latanoprost (XALATAN) 0.005 % ophthalmic solution Place 1 drop into both eyes At Bedtime      leflunomide (ARAVA) 20 MG tablet Take 1 tablet (20 mg) by mouth daily . Labs needed monthly     levETIRAcetam (KEPPRA) 500 MG tablet Take 500 mg by mouth 2 times daily     loperamide (IMODIUM A-D) 2 MG tablet Take 2 mg by mouth every 2 hours as needed for diarrhea     meclizine (ANTIVERT) 25 MG tablet Take 25 mg by mouth     MELATONIN PO Take 3 mg by mouth At Bedtime      menthol (ICY HOT) 5 % PTCH Apply 1 patch topically every 8 hours as needed for muscle soreness     METOPROLOL TARTRATE PO Take 100 mg by mouth 2 times daily      mupirocin (BACTROBAN) 2 % ointment Apply topically as needed     nitroglycerin (NITROSTAT) 0.4 MG sublingual tablet Place 0.4 mg under the tongue every 5 minutes as needed for chest pain For chest pain place 1 tablet under the tongue every 5 minutes for 3 doses. If symptoms persist 5 minutes after 1st dose call 911.     nystatin (MYCOSTATIN) 380147 UNIT/GM external powder Apply topically 2 times daily     OMEPRAZOLE PO Take 20 mg by mouth daily      ondansetron (ZOFRAN-ODT) 4 MG ODT tab Take 4 mg by mouth     POTASSIUM CHLORIDE PO Take 40 mEq by mouth daily     PRAVASTATIN SODIUM PO Take 40 mg by mouth every evening     senna-docusate (SENOKOT-S;PERICOLACE) 8.6-50 MG per tablet Take 1 tablet by mouth 2 times daily as needed      timolol (TIMOPTIC) 0.5 % ophthalmic solution Place 1 drop into both eyes 2 times daily Hold if HR <60     torsemide (DEMADEX) 20 MG tablet Take 20 mg by mouth     traMADol (ULTRAM) 50 MG tablet Take 1 tablet (50 mg) by mouth 2 times daily as needed for severe pain     TRAMADOL HCL PO Take 50 mg by mouth every 8 hours as needed for moderate to severe pain     triamcinolone (KENALOG) 0.1 % cream Apply topically 2 times daily     vancomycin (FIRVANQ) 25 MG/ML oral solution Take by mouth 4 times daily 5 ml po qid     Warfarin Sodium (COUMADIN PO) Take as directed by INR current dose 5mg PO Mon and Thur. And 3mg PO Tues, Wed, Fri, Sat, Sun next INR 1/2/18     No  current facility-administered medications for this visit.        Allergies   Allergen Reactions     Rosiglitazone Hives     Ambien      Sleepwalking     Ambien [Zolpidem] Other (See Comments) and Unknown     Sleepwalking     Sulfa Drugs Unknown     Lightheaded, GI upset  Lightheaded, GI upset  Lightheaded, GI upset     Azithromycin Diarrhea     Clindamycin Diarrhea     Codeine Nausea and Vomiting     Other reaction(s): Vomiting  Other reaction(s): Vomiting     Lisinopril Cough     Nitrofurantoin Diarrhea     Simvastatin Diarrhea     Triamterene-Hctz [Hydrochlorothiazide W/Triamterene] Swelling and Nausea     Other reaction(s): Myalgia       Information reviewed:  Medications, vital signs, orders, and nursing notes.    Review of Systems   Constitutional: Negative for appetite change and fever.   HENT: Negative for congestion, ear pain and sore throat.    Eyes: Negative for pain.   Respiratory: Negative for cough and shortness of breath.    Cardiovascular: Negative for chest pain and peripheral edema.   Gastrointestinal: Positive for diarrhea (She complains of having 3 looser stools a day.  States this is much improved.  Denies blood in her stools.). Negative for abdominal pain, constipation, heartburn and nausea.   Genitourinary: Negative for frequency.   Musculoskeletal:        Chronic back pain.   Neurological: Negative for dizziness and headaches.   Psychiatric/Behavioral: Negative for mood changes.        OBJECTIVE:  /84   Pulse 60   Temp 97.7  F (36.5  C)   Resp 18   Wt 77.1 kg (170 lb)   SpO2 96%   BMI 28.29 kg/m    Physical Exam   Constitutional: She appears well-developed and well-nourished. She is cooperative. No distress.   HENT:   Right Ear: External ear normal.   Left Ear: External ear normal.   Nose: Nose normal.   Mouth/Throat: Oropharynx is clear and moist.   Eyes: Pupils are equal, round, and reactive to light. Conjunctivae are normal. Right eye exhibits no discharge. Left eye exhibits no  discharge.   Neck: Neck supple. No tracheal deviation present. No thyromegaly present.   Cardiovascular: Normal rate, regular rhythm, S1 normal, S2 normal and normal heart sounds.   No murmur heard.  Pulmonary/Chest: Effort normal and breath sounds normal. No respiratory distress. She has no wheezes. She has no rales.   Abdominal: Soft. Bowel sounds are normal. She exhibits no mass. There is no tenderness.   Musculoskeletal: Normal range of motion. She exhibits no edema.   Lymphadenopathy:     She has no cervical adenopathy.   Neurological: She is alert. She has normal strength and normal reflexes. She exhibits normal muscle tone.   Skin: Skin is warm and dry. No rash noted.   Psychiatric: She has a normal mood and affect.        ASSESSMENT/PLAN:    Chronic diastolic congestive heart failure (H)  Presented to the hospital with acute on chronic CHF. Diuresed with IV Bumex then transitioned to oral torsemide.  -Continue torsemide, 2L Fluid restriction       Chronic back pain  Had been on tramadol previously.  Most likely this was stopped in the hospitalization when she was encephalopathic.  Her cognition is greatly improved.  Will start tramadol 50 mg twice daily PRN for pain and will follow up with the nurse practitioner next week to see if this is effective.    Hx of encephalopathy  Presented to the hospital for acute encephalopathy were reaction to Baclofen was suspected and E.coli UTI. Admitted to this facility for physical and occupational therapy due to confusion and subsequent risk for falls. Confusion improving.   -Continue physical and occupational therapy       Physical deconditioning  Continue with physical and Occupational Therapy.    Rheumatoid arthritis (H)  She continues on Arava    Type 2 diabetes mellitus (H)  Continues on Lantus and NovoLog.  Blood sugars ranging from 150s to 250s.    Clostridium difficile infection  She is having 3 semi-formed stools a day.  She remains on vancomycin until the  18th.    Chronic atrial fibrillation (H)  Rate controlled on metoprolol.  Warfarin for anticoagulation.    Peripheral arterial occlusive disease (H)  Remains on warfarin, statin and aspirin.          Aixa Esparza MD

## 2019-05-14 NOTE — ASSESSMENT & PLAN NOTE
Presented to the hospital for acute encephalopathy were reaction to Baclofen was suspected and E.coli UTI. Admitted to this facility for physical and occupational therapy due to confusion and subsequent risk for falls. Confusion improving.   -Continue physical and occupational therapy

## 2019-05-14 NOTE — ASSESSMENT & PLAN NOTE
Had been on tramadol previously.  Most likely this was stopped in the hospitalization when she was encephalopathic.  Her cognition is greatly improved.  Will start tramadol 50 mg twice daily PRN for pain and will follow up with the nurse practitioner next week to see if this is effective.

## 2019-05-15 ENCOUNTER — NURSING HOME VISIT (OUTPATIENT)
Dept: GERIATRICS | Facility: CLINIC | Age: 79
End: 2019-05-15
Payer: MEDICARE

## 2019-05-15 VITALS
BODY MASS INDEX: 28.29 KG/M2 | RESPIRATION RATE: 20 BRPM | TEMPERATURE: 96 F | DIASTOLIC BLOOD PRESSURE: 73 MMHG | OXYGEN SATURATION: 94 % | WEIGHT: 170 LBS | SYSTOLIC BLOOD PRESSURE: 132 MMHG | HEART RATE: 61 BPM

## 2019-05-15 DIAGNOSIS — Z79.4 TYPE 2 DIABETES MELLITUS WITH STAGE 3 CHRONIC KIDNEY DISEASE, WITH LONG-TERM CURRENT USE OF INSULIN (H): ICD-10-CM

## 2019-05-15 DIAGNOSIS — N18.30 TYPE 2 DIABETES MELLITUS WITH STAGE 3 CHRONIC KIDNEY DISEASE, WITH LONG-TERM CURRENT USE OF INSULIN (H): ICD-10-CM

## 2019-05-15 DIAGNOSIS — M54.6 CHRONIC MIDLINE THORACIC BACK PAIN: Primary | ICD-10-CM

## 2019-05-15 DIAGNOSIS — R53.81 PHYSICAL DECONDITIONING: ICD-10-CM

## 2019-05-15 DIAGNOSIS — G89.29 CHRONIC MIDLINE THORACIC BACK PAIN: Primary | ICD-10-CM

## 2019-05-15 DIAGNOSIS — I50.32 CHRONIC DIASTOLIC CONGESTIVE HEART FAILURE (H): ICD-10-CM

## 2019-05-15 DIAGNOSIS — Z86.69 HX OF ENCEPHALOPATHY: ICD-10-CM

## 2019-05-15 DIAGNOSIS — I48.20 CHRONIC ATRIAL FIBRILLATION (H): ICD-10-CM

## 2019-05-15 DIAGNOSIS — A49.8 CLOSTRIDIUM DIFFICILE INFECTION: ICD-10-CM

## 2019-05-15 DIAGNOSIS — E11.22 TYPE 2 DIABETES MELLITUS WITH STAGE 3 CHRONIC KIDNEY DISEASE, WITH LONG-TERM CURRENT USE OF INSULIN (H): ICD-10-CM

## 2019-05-15 PROCEDURE — 99309 SBSQ NF CARE MODERATE MDM 30: CPT | Performed by: NURSE PRACTITIONER

## 2019-05-15 NOTE — PROGRESS NOTES
[facility]    Chief Complaint   Patient presents with     Nursing Home Acute     TCU f/u       Place of Service where encounter took place:  [facility] [[id number]]    HPI:    Diya Cruz is a 79 year old  (1940), who is being seen today for an episodic care visit.  HPI information obtained from: facility chart records, facility staff and patient report.Today's concern is:     Chronic midline thoracic back pain  Chronic diastolic congestive heart failure (H)  Hx of encephalopathy  Physical deconditioning  Type 2 diabetes mellitus with stage 3 chronic kidney disease, with long-term current use of insulin (H)  Clostridium difficile infection  Chronic atrial fibrillation (H)     Brief Summary of Hospital Course: Presented to the hospital with acute encephalopathy relating to a reaction to baclofen. She was found to have E. Coli UTI and completed antibiotic treatment. Also presented in acute on chronic heart failure and was diuresed with IV Bumex then switched to oral torsemide. Also noted to have C. Difficile colitis and started Vancomycin treatment in hospital.   Updates on Status Since Skilled nursing Admission: 5/13 MD visit. tramadol ordered 50 mg bid PRN  5/15 nursing requests visit for back pain.       ALLERGIES: Rosiglitazone; Ambien; Ambien [zolpidem]; Sulfa drugs; Azithromycin; Clindamycin; Codeine; Lisinopril; Nitrofurantoin; Simvastatin; and Triamterene-hctz [hydrochlorothiazide w/triamterene]  Past Medical, Surgical, Family and Social History reviewed and updated in EPIC.    REVIEW OF SYSTEMS:  4 point ROS including Respiratory, CV, GI and , other than that noted in the HPI,  is negative    PHYSICAL EXAM:  /73   Pulse 61   Temp 96  F (35.6  C)   Resp 20   Wt 77.1 kg (170 lb)   SpO2 94%   BMI 28.29 kg/m    GENERAL APPEARANCE:  Alert, in no distress, cooperative  RESP:  respiratory effort and palpation of chest normal, no respiratory distress,  crackles fine bilateral lobes  CV:  Palpation and auscultation of heart done, peripheral edema 1+ in bilateral feet, grade 3/6 murmur  ABDOMEN:  bowel sounds hypoactive, abdomen obese, soft, non-tender, nondistended  M/S:   Gait and station observed lying supine in bed. No obvious joint deformities.    SKIN:  Inspection of skin and subcutaneous tissue baseline, Palpation of skin and subcutaneous tissue baseline. Calloused toes bilaterally. Erythema left medial foot.   PSYCH:  oriented x 3. bims 5.0/5.8 mood and affect normal    Assessment/Plan:  Mid back pain  Tramadol and baclofen stopped during hospital stay for encephalopathy. Tramdol 50 mg po BID PRN started on 5/13 during MD visit. Today patient states pain not managed. Uses heat and rest and that helps some. Tramadol is helpful. She is considering getting injections in her back.   - schedule tramadol 50 mg tid and once daily prn    Chronic diastolic congestive heart  failure (H)  Presented to the hospital with acute on chronic CHF. Diuresed with IV Bumex then transitioned to oral torsemide.  Stable.   -Continue torsemide, 2L Fluid restriction      Hx of encephalopathy  Presented to the hospital for acute encephalopathy were reaction to Baclofen was suspected and E.coli UTI. Admitted to this facility for physical and occupational therapy due to confusion and subsequent risk for falls. Confusion improving. tramadol restarted.   -Continue physical and occupational therapy      Clostridium difficile infection  Noted to have Cdifficile infection in hospital and was started on oral vancomycin.  -Continue Vancomycin QID through 5/18/19     Type 2 diabetes mellitus with stage 3 chronic kidney disease, with long-term current use of insulin (H)  Diabetes managed with lantus and novolog. Evening blood sugars occasionally elevated due to carb choices  - no changes     Chronic atrial fibrillation (H)   Rate controlled with amiodarone and metoprolol. Anticoagulated with  Warfarin. Discussed other options for anticoagulation that were discussed with patient at cardiology appointment. Would defer to primary team to manage.   - no changes     Orders:  1. Tramadol 50 mg PO TID and 50 mg once daily as needed    Electronically signed by  Manda Molina NP

## 2019-05-17 ENCOUNTER — NURSING HOME VISIT (OUTPATIENT)
Dept: GERIATRICS | Facility: CLINIC | Age: 79
End: 2019-05-17
Payer: MEDICARE

## 2019-05-17 VITALS
DIASTOLIC BLOOD PRESSURE: 68 MMHG | BODY MASS INDEX: 28.29 KG/M2 | RESPIRATION RATE: 18 BRPM | TEMPERATURE: 98.2 F | OXYGEN SATURATION: 95 % | WEIGHT: 170 LBS | HEART RATE: 84 BPM | SYSTOLIC BLOOD PRESSURE: 132 MMHG

## 2019-05-17 DIAGNOSIS — A49.8 CLOSTRIDIUM DIFFICILE INFECTION: ICD-10-CM

## 2019-05-17 DIAGNOSIS — M54.6 CHRONIC MIDLINE THORACIC BACK PAIN: ICD-10-CM

## 2019-05-17 DIAGNOSIS — G89.29 CHRONIC MIDLINE THORACIC BACK PAIN: ICD-10-CM

## 2019-05-17 DIAGNOSIS — I48.20 CHRONIC ATRIAL FIBRILLATION (H): Primary | ICD-10-CM

## 2019-05-17 DIAGNOSIS — Z79.01 LONG TERM CURRENT USE OF ANTICOAGULANT THERAPY: ICD-10-CM

## 2019-05-17 PROCEDURE — 99309 SBSQ NF CARE MODERATE MDM 30: CPT | Performed by: NURSE PRACTITIONER

## 2019-05-17 NOTE — PROGRESS NOTES
[facility]    Chief Complaint   Patient presents with     INR Followup       Place of Service where encounter took place:  [facility] [[id number]]    HPI:    Diya Cruz is a 79 year old  (1940), who is being seen today for an episodic care visit.  HPI information obtained from: facility chart records, facility staff and patient report.Today's concern is:     Chronic atrial fibrillation (H)  Chronic midline thoracic back pain  Clostridium difficile infection  Long term current use of anticoagulant therapy    ALLERGIES: Rosiglitazone; Ambien; Ambien [zolpidem]; Sulfa drugs; Azithromycin; Clindamycin; Codeine; Lisinopril; Nitrofurantoin; Simvastatin; and Triamterene-hctz [hydrochlorothiazide w/triamterene]  Past Medical, Surgical, Family and Social History reviewed and updated in EPIC.    REVIEW OF SYSTEMS:  4 point ROS including Respiratory, CV, GI and , other than that noted in the HPI,  is negative    PHYSICAL EXAM:  /68   Pulse 84   Temp 98.2  F (36.8  C)   Resp 18   Wt 77.1 kg (170 lb)   SpO2 95%   BMI 28.29 kg/m    GENERAL APPEARANCE:  Alert, in no distress    Assessment/Plan:  Chronic atrial fibrillation (H) / / Long term current use of anticoagulant therapy  No symptoms of clot, dvt, cva, no symptom of bleeding    Chronic midline thoracic back pain  Reports pain improved with scheduled tramadol    Clostridium difficile infection  Confusing report from patient. She states she hasn't had a bm since I gave her a certain pill. But also states she had diarrhea this AM.   - completed course of vanco. Follow for symptoms    Electronically signed by  Manda Molina NP    transcribed by : Letitia Holguin CMA  1.  INR 1.5-Warfarin 4 mg po Friday and Sat, 2 mg Sunday.  2.  Check INR on Monday 5/20/19.

## 2019-05-21 NOTE — PROGRESS NOTES
[facility] DISCHARGE SUMMARY  PATIENT'S NAME: Diya Cruz  YOB: 1940  MEDICAL RECORD NUMBER:  [id number]  Place of Service where encounter took place:  [facility] ([facility]) [[id number]]    PRIMARY CARE PROVIDER AND CLINIC RESPONSIBLE AFTER TRANSFER:   Ga Allen, [facility] [address] [facility] / [facility] [id number]   Non-G Provider     Transferring providers: Manda Molina CNP, MD Adan  Recent Hospitalization/ED:  [facility]  stay 11/28/17 to 12/4/17.  Date of SNF Admission: December / 04 / 2017  Date of SNF (anticipated) Discharge: May / 28 / 2019  Discharged to: previous assisted living  Cognitive Scores: bim 15/15  Physical Function: abulates 100 feet with walker  DME: Walker    CODE STATUS/ADVANCE DIRECTIVES DISCUSSION:  DNR / DNI   ALLERGIES: Rosiglitazone; Ambien; Ambien [zolpidem]; Sulfa drugs; Azithromycin; Clindamycin; Codeine; Lisinopril; Nitrofurantoin; Simvastatin; and Triamterene-hctz [hydrochlorothiazide w/triamterene]    DISCHARGE DIAGNOSIS/NURSING FACILITY COURSE:   Brief Summary of Hospital Course: Presented to the hospital with acute encephalopathy relating to a reaction to baclofen. She was found to have E. Coli UTI and completed antibiotic treatment. Also presented in acute on chronic heart failure and was diuresed with IV Bumex then switched to oral torsemide. Also noted to have C. Difficile colitis and started Vancomycin treatment in hospital.   Updates on Status Since Skilled nursing Admission: 5/13 MD visit. tramadol ordered 50 mg bid PRN 5/15 Tramadol 50 mg PO TID and 50 mg once daily as needed 5/17 INR 1.5-Warfarin 4 mg po Friday and Sat, 2 mg Sunday.  2.  Check INR on Monday 5/20/19. 5/22 seen for discharge summary and extended vanco x 2 weeks    Chronic diastolic congestive heart  failure (H)  Presented to the hospital with acute on chronic CHF. Diuresed with IV Bumex then transitioned to oral torsemide.   Has been stable at TCU on torsemide.  -Continue torsemide, 2L Fluid restriction      Hx of encephalopathy  Presented to the hospital for acute encephalopathy were reaction to Baclofen was suspected and E.coli UTI. Admitted to this facility for physical and occupational therapy due to confusion and subsequent risk for falls. Confusion improved.  She will return to her previous assisted living apartment  -Continue physical and occupational therapy      Clostridium difficile infection  Noted to have Cdifficile infection in hospital and was started on oral vancomycin.  Began having diarrhea after vancomycin was completed and vancomycin was restarted today and will continue for 2 more weeks  -Continue Vancomycin QID through June 5     Type 2 diabetes mellitus with stage 3 chronic kidney disease, with long-term current use of insulin (H)  Diabetes managed with lantus and novolog. With IV diuresis in hospital and antibiotics, continue to monitor kidney function.  Blood sugars well controlled while at tcu blood sugar range in the last 4 days was .     Chronic atrial fibrillation (H)   Rate controlled with amiodarone and metoprolol. Anticoagulated with Warfarin.  Next INR will be due 5/31     Mild episode of recurrent major depressive disorder (H)  Managed with Cymbalta.      Seizure disorder (H)  Chronic. Stable. Managed with Keppra.    Midline back pain  Chronic ongoing problem. previous ER visits due to uncontrolled pain.  Restarted tramadol during TCU stay. recommend she follow-up with ispine as discussed with her primary care provider.  Recommend avoiding muscle relaxer due to concerns of confusion    Past Medical History:  has a past medical history of Abdominal aortic stenosis, Anxiety, Basal cell carcinoma, Carotid stenosis, Cerebrovascular accident (CVA) (H) (8/19/2013), Claudication (H), Closed supracondylar fracture of left humerus with routine healing, subsequent encounter (12/8/2017), Compression  fracture, Depressive disorder, GI bleed, Glaucoma, Hyperlipidemia, Meniere syndrome, Obesity, Obstructive sleep apnea (adult) (pediatric), Peripheral vascular disease, unspecified (H), Pure hypercholesterolemia, Rheumatoid arthritis(714.0), Seizure disorder (H), Sjogren's disease (H), Type II or unspecified type diabetes mellitus without mention of complication, not stated as uncontrolled, Unspecified cerebral artery occlusion with cerebral infarction, Unspecified epilepsy without mention of intractable epilepsy, and Unspecified essential hypertension.    Discharge Medications:  Current Outpatient Medications   Medication Sig Dispense Refill     acetaminophen (TYLENOL) 325 MG tablet Take 650 mg by mouth 2 times daily And 650 mg po tid       acetaminophen (TYLENOL) 325 MG tablet Take 650 mg by mouth every 6 hours as needed for pain        amiodarone (PACERONE/CODARONE) 200 MG tablet Take 1 tablet (200 mg) by mouth daily 30 tablet 0     AMLODIPINE BESYLATE PO Take 10 mg by mouth daily        aspirin 81 MG EC tablet Take 81 mg by mouth daily       clotrimazole (LOTRIMIN) 1 % cream Apply topically 2 times daily as needed       CRANBERRY-VIT C-LACTOBACILLUS PO Take 1 tablet by mouth daily       diclofenac (VOLTAREN) 1 % topical gel Place onto the skin 4 times daily       DULoxetine (CYMBALTA) 60 MG EC capsule Take 60 mg by mouth 2 times daily       ergocalciferol (ERGOCALCIFEROL) 22026 units capsule Take 50,000 Units by mouth once a week       ferrous sulfate (IRON) 325 (65 Fe) MG tablet Take 325 mg by mouth every other day       fluticasone (FLOVENT DISKUS) 50 MCG/BLIST AEPB Inhale 2 puffs into the lungs 2 times daily as needed       FOLIC ACID PO Take 1 mg by mouth daily       GABAPENTIN PO Take 300 mg by mouth 3 times daily        insulin aspart (NOVOLOG FLEXPEN) 100 UNIT/ML injection Inject 8 Units Subcutaneous 2 times daily (with meals) Give with lunch and dinner       insulin glargine (LANTUS) 100 UNIT/ML injection  Inject 15 Units Subcutaneous At Bedtime        Lactobacillus (PROBIOTIC ACIDOPHILUS PO) Take 2 tablets by mouth daily       latanoprost (XALATAN) 0.005 % ophthalmic solution Place 1 drop into both eyes At Bedtime       leflunomide (ARAVA) 20 MG tablet Take 1 tablet (20 mg) by mouth daily . Labs needed monthly 30 tablet 3     levETIRAcetam (KEPPRA) 500 MG tablet Take 500 mg by mouth 2 times daily       loperamide (IMODIUM A-D) 2 MG tablet Take 2 mg by mouth every 2 hours as needed for diarrhea       meclizine (ANTIVERT) 25 MG tablet Take 25 mg by mouth       MELATONIN PO Take 3 mg by mouth At Bedtime        menthol (ICY HOT) 5 % PTCH Apply 1 patch topically every 8 hours as needed for muscle soreness       METOPROLOL TARTRATE PO Take 100 mg by mouth 2 times daily        mupirocin (BACTROBAN) 2 % ointment Apply topically as needed       nitroglycerin (NITROSTAT) 0.4 MG sublingual tablet Place 0.4 mg under the tongue every 5 minutes as needed for chest pain For chest pain place 1 tablet under the tongue every 5 minutes for 3 doses. If symptoms persist 5 minutes after 1st dose call 911.       nystatin (MYCOSTATIN) 115989 UNIT/GM external powder Apply topically 2 times daily       OMEPRAZOLE PO Take 20 mg by mouth daily        ondansetron (ZOFRAN-ODT) 4 MG ODT tab Take 4 mg by mouth       POTASSIUM CHLORIDE PO Take 40 mEq by mouth daily       PRAVASTATIN SODIUM PO Take 40 mg by mouth every evening       senna-docusate (SENOKOT-S;PERICOLACE) 8.6-50 MG per tablet Take 1 tablet by mouth 2 times daily as needed        timolol (TIMOPTIC) 0.5 % ophthalmic solution Place 1 drop into both eyes 2 times daily Hold if HR <60       torsemide (DEMADEX) 20 MG tablet Take 20 mg by mouth       traMADol (ULTRAM) 50 MG tablet Take 1 tablet (50 mg) by mouth 2 times daily as needed for severe pain 30 tablet 0     TRAMADOL HCL PO Take 50 mg by mouth every 8 hours as needed for moderate to severe pain       triamcinolone (KENALOG) 0.1 % cream  Apply topically 2 times daily       Warfarin Sodium (COUMADIN PO) Take as directed by INR current dose 5mg PO Mon and Thur. And 3mg PO Tues, Wed, Fri, Sat, Sun next INR 1/2/18       amiodarone (PACERONE/CODARONE) 200 MG tablet Take 200 mg by mouth daily       FUROSEMIDE PO Take 40 mg by mouth 2 times daily       hydrocortisone (CORTAID) 1 % cream Apply topically 2 times daily as needed       Controlled medications sent with patient:   30-day supply of tramadol     ROS:   4 point ROS including Respiratory, CV, GI and , other than that noted in the HPI,  is negative    Physical Exam:   Vitals: /65   Pulse 60   Temp 99.4  F (37.4  C)   Resp 20   Wt 74.4 kg (164 lb)   SpO2 95%   BMI 27.29 kg/m    BMI= Body mass index is 27.29 kg/m .  General appearance alert in no distress    SNF labs: 5/10 sodium 142 potassium 3.2 glucose 143 creatinine 1.16 GFR 45.  5/13 potassium 3.6    DISCHARGE PLAN:    Follow up labs: No labs orders/due    Medical Follow Up:      Follow up with PCP in 2 weeks    MTM referral needed and placed by this provider: No    Current [place] scheduled appointments:    Discharge Services: Home Care:  Occupational Therapy, Physical Therapy and Registered Nurse      TOTAL DISCHARGE TIME:   Greater than 30 minutes  Electronically signed by:  Manda Molina NP

## 2019-05-22 ENCOUNTER — NURSING HOME VISIT (OUTPATIENT)
Dept: GERIATRICS | Facility: CLINIC | Age: 79
End: 2019-05-22
Payer: MEDICARE

## 2019-05-22 VITALS
HEART RATE: 60 BPM | BODY MASS INDEX: 27.29 KG/M2 | TEMPERATURE: 99.4 F | RESPIRATION RATE: 20 BRPM | OXYGEN SATURATION: 95 % | DIASTOLIC BLOOD PRESSURE: 65 MMHG | WEIGHT: 164 LBS | SYSTOLIC BLOOD PRESSURE: 148 MMHG

## 2019-05-22 DIAGNOSIS — R53.81 PHYSICAL DECONDITIONING: ICD-10-CM

## 2019-05-22 DIAGNOSIS — G40.909 SEIZURE DISORDER (H): ICD-10-CM

## 2019-05-22 DIAGNOSIS — I50.32 CHRONIC DIASTOLIC CONGESTIVE HEART FAILURE (H): ICD-10-CM

## 2019-05-22 DIAGNOSIS — A49.8 CLOSTRIDIUM DIFFICILE INFECTION: ICD-10-CM

## 2019-05-22 DIAGNOSIS — I48.20 CHRONIC ATRIAL FIBRILLATION (H): Primary | ICD-10-CM

## 2019-05-22 DIAGNOSIS — G89.29 CHRONIC MIDLINE THORACIC BACK PAIN: ICD-10-CM

## 2019-05-22 DIAGNOSIS — M54.6 CHRONIC MIDLINE THORACIC BACK PAIN: ICD-10-CM

## 2019-05-22 DIAGNOSIS — Z79.4 TYPE 2 DIABETES MELLITUS WITH HYPERGLYCEMIA, WITH LONG-TERM CURRENT USE OF INSULIN (H): ICD-10-CM

## 2019-05-22 DIAGNOSIS — E11.65 TYPE 2 DIABETES MELLITUS WITH HYPERGLYCEMIA, WITH LONG-TERM CURRENT USE OF INSULIN (H): ICD-10-CM

## 2019-05-22 PROCEDURE — 99316 NF DSCHRG MGMT 30 MIN+: CPT | Performed by: NURSE PRACTITIONER

## 2019-06-12 ENCOUNTER — RECORDS - HEALTHEAST (OUTPATIENT)
Dept: LAB | Facility: CLINIC | Age: 79
End: 2019-06-12

## 2019-06-13 LAB — INR PPP: 1.14 (ref 0.9–1.1)

## 2019-06-17 ENCOUNTER — TRANSFERRED RECORDS (OUTPATIENT)
Dept: HEALTH INFORMATION MANAGEMENT | Facility: CLINIC | Age: 79
End: 2019-06-17

## 2019-06-17 ENCOUNTER — RECORDS - HEALTHEAST (OUTPATIENT)
Dept: LAB | Facility: CLINIC | Age: 79
End: 2019-06-17

## 2019-06-17 LAB
ALBUMIN SERPL-MCNC: 4 G/DL (ref 3.5–5)
ALBUMIN SERPL-MCNC: 4 G/DL (ref 3.5–5)
ALP SERPL-CCNC: 83 U/L (ref 45–120)
ALP SERPL-CCNC: 83 U/L (ref 45–120)
ALT SERPL W P-5'-P-CCNC: 18 U/L (ref 0–45)
ALT SERPL-CCNC: 18 U/L (ref 0–45)
AST SERPL W P-5'-P-CCNC: 18 U/L (ref 0–40)
AST SERPL-CCNC: 18 U/L (ref 0–40)
BILIRUB DIRECT SERPL-MCNC: 0.3 MG/DL
BILIRUB SERPL-MCNC: 0.6 MG/DL (ref 0–1)
BILIRUB SERPL-MCNC: 0.6 MG/DL (ref 0–1)
BILIRUBIN DIRECT: 0.3 MG/DL
INR PPP: 1.17 (ref 0.9–1.1)
PROT SERPL-MCNC: 7 G/DL (ref 6–8)
PROT SERPL-MCNC: 7 G/DL (ref 6–8)

## 2019-06-24 ENCOUNTER — TELEPHONE (OUTPATIENT)
Dept: RHEUMATOLOGY | Facility: CLINIC | Age: 79
End: 2019-06-24

## 2019-06-24 NOTE — TELEPHONE ENCOUNTER
Left message for patient to schedule a follow up for rheumatology.    Patient is over due for follow up per Dr. Hunter.    Linnette Proctor CMA Rheumatology  6/24/2019 11:34 AM

## 2019-06-24 NOTE — LETTER
[facility]    [facility]   (Monday)  [address]  [place] [address]       [facility]   (Tuesday)  [address] [place] [address]    [facility]   (Wed., Thurs., and Friday)  [address] [place] [address]    Phone number: [phone]            Keegan Keane,    Hope you are doing well. I am sending you this letter to remind you that you are over due for a follow up appointment in Rheumatology. If you would like to schedule your appointment please call:  [phone].    Take care and hope to see you soon!    Jim Hunter MD  7/5/2019

## 2019-07-12 ENCOUNTER — RECORDS - HEALTHEAST (OUTPATIENT)
Dept: LAB | Facility: CLINIC | Age: 79
End: 2019-07-12

## 2019-07-15 ENCOUNTER — MEDICAL CORRESPONDENCE (OUTPATIENT)
Dept: HEALTH INFORMATION MANAGEMENT | Facility: CLINIC | Age: 79
End: 2019-07-15

## 2019-07-15 LAB
ALBUMIN SERPL-MCNC: 3 G/DL (ref 3.5–5)
ALP SERPL-CCNC: 99 U/L (ref 45–120)
ALT SERPL W P-5'-P-CCNC: 15 U/L (ref 0–45)
AST SERPL W P-5'-P-CCNC: 18 U/L (ref 0–40)
BILIRUB DIRECT SERPL-MCNC: 0.2 MG/DL
BILIRUB SERPL-MCNC: 0.4 MG/DL (ref 0–1)
PROT SERPL-MCNC: 5.8 G/DL (ref 6–8)

## 2019-07-20 ENCOUNTER — RECORDS - HEALTHEAST (OUTPATIENT)
Dept: LAB | Facility: CLINIC | Age: 79
End: 2019-07-20

## 2019-07-20 LAB
ALBUMIN UR-MCNC: NEGATIVE MG/DL
APPEARANCE UR: CLEAR
BACTERIA #/AREA URNS HPF: ABNORMAL HPF
BILIRUB UR QL STRIP: NEGATIVE
COLOR UR AUTO: ABNORMAL
GLUCOSE UR STRIP-MCNC: NEGATIVE MG/DL
HGB UR QL STRIP: NEGATIVE
KETONES UR STRIP-MCNC: NEGATIVE MG/DL
LEUKOCYTE ESTERASE UR QL STRIP: ABNORMAL
NITRATE UR QL: NEGATIVE
PH UR STRIP: 5 [PH] (ref 4.5–8)
RBC #/AREA URNS AUTO: ABNORMAL HPF
SP GR UR STRIP: 1.01 (ref 1–1.03)
SQUAMOUS #/AREA URNS AUTO: ABNORMAL LPF
TRANS CELLS #/AREA URNS HPF: ABNORMAL LPF
UROBILINOGEN UR STRIP-ACNC: ABNORMAL
WBC #/AREA URNS AUTO: ABNORMAL HPF
WBC CLUMPS #/AREA URNS HPF: PRESENT /[HPF]

## 2019-07-22 ENCOUNTER — TELEPHONE (OUTPATIENT)
Dept: RHEUMATOLOGY | Facility: CLINIC | Age: 79
End: 2019-07-22

## 2019-07-22 DIAGNOSIS — Z79.899 HIGH RISK MEDICATION USE: Primary | ICD-10-CM

## 2019-07-22 DIAGNOSIS — M19.90 INFLAMMATORY ARTHRITIS: ICD-10-CM

## 2019-07-22 NOTE — TELEPHONE ENCOUNTER
Reason for call:  Other   Patient called regarding (reason for call): call back  Additional comments: Patients daughter is calling about the medication leflunomide (ARAVA) 20 MG tablet, said her mom was in the hospital and TCU is why she could not be seen. She wants Dr. Hunter to reconsider the refill. Please call back     Phone number to reach patient:  Home number on file [phone] (home)    Best Time:  any    Can we leave a detailed message on this number?  YES

## 2019-07-23 LAB
BACTERIA SPEC CULT: ABNORMAL
BACTERIA SPEC CULT: ABNORMAL

## 2019-07-23 NOTE — TELEPHONE ENCOUNTER
Patient's daughter Maria will like a call back today, patient has been out of medication since Friday and will like a refill.

## 2019-07-24 NOTE — TELEPHONE ENCOUNTER
Advised patient's daughter that we will get back to her soon and message has been forwarded to Dr. Hunter to review recent labs and assess that medication is safe for patient to continue. Daughter was very upset and stated her mom is having a lot of joint pain and needs her medication asap. Forwarded to Dr. Hunter.    Lul Hdz RN....7/24/2019 3:05 PM

## 2019-07-24 NOTE — TELEPHONE ENCOUNTER
Requesting leflunomide refill.  Last seen on 1/11/19.  Hepatic panel done on 6/17/19  CBC & Creatinine done on 5/7/19  Could not make last apt due to being ill.  Next apt on 10/21/19.    Lul Hdz RN....7/24/2019 9:00 AM

## 2019-07-25 ENCOUNTER — TELEPHONE (OUTPATIENT)
Dept: RHEUMATOLOGY | Facility: CLINIC | Age: 79
End: 2019-07-25

## 2019-07-25 RX ORDER — LEFLUNOMIDE 20 MG/1
20 TABLET ORAL DAILY
Qty: 30 TABLET | Refills: 3 | Status: SHIPPED | OUTPATIENT
Start: 2019-07-25 | End: 2019-10-21

## 2019-07-25 NOTE — TELEPHONE ENCOUNTER
Informed patient that medication was refilled.  Please fax lab orders to [facility] [place], f: [phone].    Lul Hdz RN....7/25/2019 8:44 AM

## 2019-07-25 NOTE — TELEPHONE ENCOUNTER
Verified fax number with [facility].  Re-faxed labs to correct fax: [phone].    Lul Hdz RN....7/25/2019 3:08 PM

## 2019-07-25 NOTE — TELEPHONE ENCOUNTER
Reason for call:  Other   Patient called regarding (reason for call): call back  Additional comments: Arminda from [facility] is calling because she says she needs lab orders on this patient. Please call back      Phone number to reach patient:  Other phone number:  [phone]    Best Time:  any    Can we leave a detailed message on this number?  YES

## 2019-07-25 NOTE — TELEPHONE ENCOUNTER
Rheumatology team: Please call to notify Ms. Cruz that leflunomide has been refilled.  Next labs are due in early August 2019: CBC, Cr, Hepatic Panel    Jim Hunter MD  7/25/2019 7:51 AM

## 2019-07-25 NOTE — TELEPHONE ENCOUNTER
Lab orders printed and faxed to [facility].  Linnette Proctor CMA Rheumatology  7/25/2019 10:54 AM

## 2019-08-01 ENCOUNTER — RECORDS - HEALTHEAST (OUTPATIENT)
Dept: LAB | Facility: CLINIC | Age: 79
End: 2019-08-01

## 2019-08-02 ENCOUNTER — MEDICAL CORRESPONDENCE (OUTPATIENT)
Dept: HEALTH INFORMATION MANAGEMENT | Facility: CLINIC | Age: 79
End: 2019-08-02

## 2019-08-02 ENCOUNTER — TRANSFERRED RECORDS (OUTPATIENT)
Dept: HEALTH INFORMATION MANAGEMENT | Facility: CLINIC | Age: 79
End: 2019-08-02

## 2019-08-02 LAB
ALBUMIN SERPL-MCNC: 3.2 G/DL (ref 3.5–5)
ALP SERPL-CCNC: 112 U/L (ref 45–120)
ALT SERPL W P-5'-P-CCNC: 11 U/L (ref 0–45)
ALT SERPL-CCNC: 11 U/L (ref 0–45)
AST SERPL W P-5'-P-CCNC: 19 U/L (ref 0–40)
AST SERPL-CCNC: 19 U/L (ref 0–40)
BASOPHILS # BLD AUTO: 0 THOU/UL (ref 0–0.2)
BASOPHILS NFR BLD AUTO: 1 % (ref 0–2)
BILIRUB DIRECT SERPL-MCNC: 0.2 MG/DL
BILIRUB SERPL-MCNC: 0.5 MG/DL (ref 0–1)
C REACTIVE PROTEIN LHE: 0.4 MG/DL (ref 0–0.8)
CREAT SERPL-MCNC: 0.91 MG/DL (ref 0.6–1.1)
CREAT SERPL-MCNC: 0.91 MG/DL (ref 0.6–1.1)
EOSINOPHIL # BLD AUTO: 0.1 THOU/UL (ref 0–0.4)
EOSINOPHIL NFR BLD AUTO: 2 % (ref 0–6)
ERYTHROCYTE [DISTWIDTH] IN BLOOD BY AUTOMATED COUNT: 18 % (ref 11–14.5)
ERYTHROCYTE [SEDIMENTATION RATE] IN BLOOD BY WESTERGREN METHOD: 18 MM/HR (ref 0–20)
GFR SERPL CREATININE-BSD FRML MDRD: 60 ML/MIN/1.73M2
GFR SERPL CREATININE-BSD FRML MDRD: 60 ML/MIN/1.73M2
HCT VFR BLD AUTO: 34.9 % (ref 35–47)
HGB BLD-MCNC: 10.8 G/DL (ref 12–16)
LYMPHOCYTES # BLD AUTO: 0.7 THOU/UL (ref 0.8–4.4)
LYMPHOCYTES NFR BLD AUTO: 17 % (ref 20–40)
MCH RBC QN AUTO: 30.3 PG (ref 27–34)
MCHC RBC AUTO-ENTMCNC: 30.9 G/DL (ref 32–36)
MCV RBC AUTO: 98 FL (ref 80–100)
MONOCYTES # BLD AUTO: 0.6 THOU/UL (ref 0–0.9)
MONOCYTES NFR BLD AUTO: 15 % (ref 2–10)
NEUTROPHILS # BLD AUTO: 2.7 THOU/UL (ref 2–7.7)
NEUTROPHILS NFR BLD AUTO: 65 % (ref 50–70)
PLATELET # BLD AUTO: 141 THOU/UL (ref 140–440)
PMV BLD AUTO: 10.4 FL (ref 8.5–12.5)
PROT SERPL-MCNC: 5.8 G/DL (ref 6–8)
RBC # BLD AUTO: 3.57 MILL/UL (ref 3.8–5.4)
WBC: 4.1 THOU/UL (ref 4–11)

## 2019-08-21 ENCOUNTER — TELEPHONE (OUTPATIENT)
Dept: RHEUMATOLOGY | Facility: CLINIC | Age: 79
End: 2019-08-21

## 2019-08-21 NOTE — TELEPHONE ENCOUNTER
Rheumatology team: Please call to inform Ms. Cruz that her 8/2/2019 labs do not show evidence for medication toxicity.    Jim Hunter MD  8/21/2019 12:23 PM

## 2019-08-22 NOTE — TELEPHONE ENCOUNTER
Called and spoke to Maria letting her know no sign of medication toxicity. She thanked me for calling.  Juanita Valencia Certified Medical Assistant

## 2019-08-22 NOTE — TELEPHONE ENCOUNTER
1st attempt to contact Pt, l/m having Pt's daughter Maria return call to clinic @ [phone] re: informing Ms. Cruz that her 8/2/2019 labs do not show evidence for medication toxicity.    Renita Jackson, RAQUEL  8/22/2019  9:30 AM

## 2019-10-09 ENCOUNTER — RECORDS - HEALTHEAST (OUTPATIENT)
Dept: LAB | Facility: CLINIC | Age: 79
End: 2019-10-09

## 2019-10-09 LAB — INR PPP: 2.6 (ref 0.9–1.1)

## 2019-10-21 ENCOUNTER — TELEPHONE (OUTPATIENT)
Dept: RHEUMATOLOGY | Facility: CLINIC | Age: 79
End: 2019-10-21

## 2019-10-21 ENCOUNTER — OFFICE VISIT (OUTPATIENT)
Dept: RHEUMATOLOGY | Facility: CLINIC | Age: 79
End: 2019-10-21
Payer: MEDICARE

## 2019-10-21 VITALS
HEART RATE: 64 BPM | BODY MASS INDEX: 30.82 KG/M2 | HEIGHT: 65 IN | SYSTOLIC BLOOD PRESSURE: 131 MMHG | OXYGEN SATURATION: 90 % | DIASTOLIC BLOOD PRESSURE: 77 MMHG | WEIGHT: 185 LBS

## 2019-10-21 DIAGNOSIS — Z79.899 HIGH RISK MEDICATION USE: ICD-10-CM

## 2019-10-21 DIAGNOSIS — M32.9 HX OF SYSTEMIC LUPUS ERYTHEMATOSUS (SLE) (H): ICD-10-CM

## 2019-10-21 DIAGNOSIS — R82.90 NONSPECIFIC FINDING ON EXAMINATION OF URINE: ICD-10-CM

## 2019-10-21 DIAGNOSIS — M06.4 INFLAMMATORY POLYARTHROPATHY (H): Primary | ICD-10-CM

## 2019-10-21 LAB
ALBUMIN SERPL-MCNC: 3.7 G/DL (ref 3.4–5)
ALBUMIN UR-MCNC: NEGATIVE MG/DL
ALP SERPL-CCNC: 114 U/L (ref 40–150)
ALT SERPL W P-5'-P-CCNC: 15 U/L (ref 0–50)
ANION GAP SERPL CALCULATED.3IONS-SCNC: 5 MMOL/L (ref 3–14)
APPEARANCE UR: ABNORMAL
AST SERPL W P-5'-P-CCNC: 23 U/L (ref 0–45)
BACTERIA #/AREA URNS HPF: ABNORMAL /HPF
BASOPHILS # BLD AUTO: 0 10E9/L (ref 0–0.2)
BASOPHILS NFR BLD AUTO: 0.5 %
BILIRUB SERPL-MCNC: 0.6 MG/DL (ref 0.2–1.3)
BILIRUB UR QL STRIP: NEGATIVE
BUN SERPL-MCNC: 32 MG/DL (ref 7–30)
CALCIUM SERPL-MCNC: 9.4 MG/DL (ref 8.5–10.1)
CHLORIDE SERPL-SCNC: 112 MMOL/L (ref 94–109)
CO2 SERPL-SCNC: 23 MMOL/L (ref 20–32)
COLOR UR AUTO: YELLOW
CREAT SERPL-MCNC: 1.32 MG/DL (ref 0.52–1.04)
CREAT UR-MCNC: 41 MG/DL
CRP SERPL-MCNC: 13.3 MG/L (ref 0–8)
DIFFERENTIAL METHOD BLD: ABNORMAL
EOSINOPHIL # BLD AUTO: 0.2 10E9/L (ref 0–0.7)
EOSINOPHIL NFR BLD AUTO: 3.3 %
ERYTHROCYTE [DISTWIDTH] IN BLOOD BY AUTOMATED COUNT: 15.4 % (ref 10–15)
ERYTHROCYTE [SEDIMENTATION RATE] IN BLOOD BY WESTERGREN METHOD: 44 MM/H (ref 0–30)
GFR SERPL CREATININE-BSD FRML MDRD: 39 ML/MIN/{1.73_M2}
GLUCOSE SERPL-MCNC: 141 MG/DL (ref 70–99)
GLUCOSE UR STRIP-MCNC: NEGATIVE MG/DL
HCT VFR BLD AUTO: 33.1 % (ref 35–47)
HGB BLD-MCNC: 10.6 G/DL (ref 11.7–15.7)
HGB UR QL STRIP: NEGATIVE
KETONES UR STRIP-MCNC: NEGATIVE MG/DL
LEUKOCYTE ESTERASE UR QL STRIP: ABNORMAL
LYMPHOCYTES # BLD AUTO: 0.9 10E9/L (ref 0.8–5.3)
LYMPHOCYTES NFR BLD AUTO: 14.1 %
MCH RBC QN AUTO: 32.5 PG (ref 26.5–33)
MCHC RBC AUTO-ENTMCNC: 32 G/DL (ref 31.5–36.5)
MCV RBC AUTO: 102 FL (ref 78–100)
MONOCYTES # BLD AUTO: 0.9 10E9/L (ref 0–1.3)
MONOCYTES NFR BLD AUTO: 13.3 %
NEUTROPHILS # BLD AUTO: 4.4 10E9/L (ref 1.6–8.3)
NEUTROPHILS NFR BLD AUTO: 68.8 %
NITRATE UR QL: NEGATIVE
NON-SQ EPI CELLS #/AREA URNS LPF: ABNORMAL /LPF
PH UR STRIP: 5 PH (ref 5–7)
PLATELET # BLD AUTO: 241 10E9/L (ref 150–450)
POTASSIUM SERPL-SCNC: 4.4 MMOL/L (ref 3.4–5.3)
PROT SERPL-MCNC: 7.4 G/DL (ref 6.8–8.8)
PROT UR-MCNC: 0.18 G/L
PROT/CREAT 24H UR: 0.44 G/G CR (ref 0–0.2)
RBC # BLD AUTO: 3.26 10E12/L (ref 3.8–5.2)
RBC #/AREA URNS AUTO: ABNORMAL /HPF
SODIUM SERPL-SCNC: 140 MMOL/L (ref 133–144)
SOURCE: ABNORMAL
SP GR UR STRIP: <=1.005 (ref 1–1.03)
UROBILINOGEN UR STRIP-ACNC: 0.2 EU/DL (ref 0.2–1)
WBC # BLD AUTO: 6.4 10E9/L (ref 4–11)
WBC #/AREA URNS AUTO: ABNORMAL /HPF
WBC CLUMPS #/AREA URNS HPF: PRESENT /HPF

## 2019-10-21 PROCEDURE — 86140 C-REACTIVE PROTEIN: CPT | Performed by: INTERNAL MEDICINE

## 2019-10-21 PROCEDURE — 86225 DNA ANTIBODY NATIVE: CPT | Performed by: INTERNAL MEDICINE

## 2019-10-21 PROCEDURE — 99214 OFFICE O/P EST MOD 30 MIN: CPT | Performed by: INTERNAL MEDICINE

## 2019-10-21 PROCEDURE — 36415 COLL VENOUS BLD VENIPUNCTURE: CPT | Performed by: INTERNAL MEDICINE

## 2019-10-21 PROCEDURE — 86160 COMPLEMENT ANTIGEN: CPT | Performed by: INTERNAL MEDICINE

## 2019-10-21 PROCEDURE — 80053 COMPREHEN METABOLIC PANEL: CPT | Performed by: INTERNAL MEDICINE

## 2019-10-21 PROCEDURE — 81001 URINALYSIS AUTO W/SCOPE: CPT | Performed by: INTERNAL MEDICINE

## 2019-10-21 PROCEDURE — 87086 URINE CULTURE/COLONY COUNT: CPT | Performed by: INTERNAL MEDICINE

## 2019-10-21 PROCEDURE — 85652 RBC SED RATE AUTOMATED: CPT | Performed by: INTERNAL MEDICINE

## 2019-10-21 PROCEDURE — 85025 COMPLETE CBC W/AUTO DIFF WBC: CPT | Performed by: INTERNAL MEDICINE

## 2019-10-21 PROCEDURE — 84156 ASSAY OF PROTEIN URINE: CPT | Performed by: INTERNAL MEDICINE

## 2019-10-21 RX ORDER — LEFLUNOMIDE 20 MG/1
20 TABLET ORAL DAILY
Qty: 90 TABLET | Refills: 1 | Status: SHIPPED | OUTPATIENT
Start: 2019-10-21

## 2019-10-21 ASSESSMENT — MIFFLIN-ST. JEOR: SCORE: 1325.03

## 2019-10-21 NOTE — TELEPHONE ENCOUNTER
Reason for call:  Medication     If this is a refill request, has the caller requested the refill from the pharmacy already? Yes     Will the patient be using a [facility]? No     Name of the pharmacy and phone number for the current request: [facility] [phone]    Name of the medication requested:Buprenorphine patch     Other request: Please call Arminda back at [facility] to discuss     Phone number to reach patient:  Other phone number:  [phone]    Best Time:  any    Can we leave a detailed message on this number?  YES

## 2019-10-21 NOTE — TELEPHONE ENCOUNTER
Called and advised Arminda that Dr. Hunter did not prescribe Buprenorphine patch. That was all she needed. No further questions.    Lul Hdz RN....10/21/2019 9:36 AM

## 2019-10-21 NOTE — PROGRESS NOTES
Rheumatology Clinic Visit      Diya Cruz MRN# [id number]   YOB: 1940 Age: 79 year old      Date of visit: 10/21/19   PCP: Dr. Ga Allen at [facility] in [place]    Chief Complaint   Patient presents with:  Arthritis: RA follow up    Assessment and Plan     1.  Inflammatory polyarthropathy: Reportedly diagnosed with rheumatoid arthritis in about 2000; and there is a question of SLE per patient. Established care with me on 1/11/2019, at which point she had symmetric synovitis of the MCPs.  Lower suspicion at this time for SLE.  Previously on MTX (ineffective per patient), remicade (first dose associated with a seizure).  Items complicating tx  retinopathy (so will avoid HCQ), and heart failure (so will avoid TNF inhibitors).  Currently on leflunomide 20 mg daily and doing well except for some pain without clear synovitis of her hands; hand symptoms are mild and she does not want to adjust treatment based on this.    - Continue leflunomide 20 mg daily   - Labs today: CBC, CMP, ESR, CRP, dsDNA, C3, C4, UA, Uprotein:creatinine  - Labs in 3 months: CBC, Cr, Hepatic Panel, ESR, CRP (printed so that she may have done at her living facility)    2. Back pain: mechanical in nature. Continue to follow with [facility] and PCP for this issue.     3. Bone Health: She reports a hx of osteoporosis that is already managed by her PCP.     Ms. Cruz verbalized agreement with and understanding of the rational for the diagnosis and treatment plan.  All questions were answered to best of my ability and the patient's satisfaction. Ms. Cruz was advised to contact the clinic with any questions that may arise after the clinic visit.      Thank you for involving me in the care of the patient    Return to clinic: 6 months      HPI   Diya Cruz is a 79 year old female with a past medical history significant for lung nodules, chronic back pain, low iron, spinal stenosis, depression,  obstructive sleep apnea, peripheral arterial disease, heart failure, hypertension, atrial fibrillation on anticoagulation, diabetes, seizure history, retinopathy, chronic kidney disease, lupus, and rheumatoid arthritis who presents for follow-up of lupus and rheumatoid arthritis.    1/11/2019, Ms. Cruz reported that she has lupus and RA.  Neuropathy in her toes that comes and goes.  Slow to get up in the AM.  COPD and is on supplemental O2.  Joint pain in knees and bilateral 1st toes, hips, lower back with hx of herniated disk and vertebral fx. Has been seeing rheumatologists since about 2000.  Was on MTX and prednisone.  She says that methotrexate was used for a while without clear benefit.  Morning stiffness for about 1 hour, better with activity.  Fingers and toes swell.  Ankles swell.  Joint pains improved but initial activity, but then also worsen with too much activity.  Has spinal stenosis hx.  Going to [facility] pain clinic where they wanted to do steroid injections but is on anticoagulation for atrial fibrillation so has not done the injection yet.  The patient, and her son who is with her today, explained more that her rheumatoid arthritis has been an issue, but lupus was never completely clear as a diagnosis for her versus just a comment made by previous rheumatologist. Recalls having a seizure when on remicade.     5/2/2019: admitted for encephalopathy due to baclofen toxicity and E. Coli, C difficile colitis     5/3/2019: patient cancelled appointment.     Today, 10/21/2019: She reports doing well with regard to her peripheral joints.  No hand pain or stiffness.  The only issue she has at this time is of her mid back that is painful and she is following at [facility] where she says that she will potentially have a steroid injection done.  She has a topical pain patch currently that is not very effective per patient.    Denies fevers, chills, nausea, vomiting.  Occasional constipation or diarrhea. No  abdominal pain. No chest pain/pressure, palpitations, or shortness of breath, but notes that she does have COPD so sometimes feels short of breath; she uses supplemental oxygen but is questioning if she needs it. No LE swelling. No neck pain. No oral or nasal sores.  No rash. No sicca symptoms. No photosensitivity or photophobia. No eye pain or redness. No history of inflammatory eye disease.  No history of DVT or pulmonary embolism; hx of CVA with mild residual memory loss.  No history of serositis.  No history of Raynaud's Phenomenon.      No pain or burning with urination.  No increased urinary frequency.    Her son is present with her today.    Tobacco: former smoker  EtOH: none  Drugs: none    ROS   GEN: No fevers, chills, night sweats, or weight change  SKIN: No itching, rashes, sores  HEENT: No oral or nasal ulcers.  CV: See HPI  PULM: See HPI  GI: See HPI.  No blood in stool. No abdominal pain.  : No blood in urine.  MSK: See HPI.  NEURO: Chronic stable numbness and tingling in the bilateral lower legs that she attributes to spine issues  ENDO: No heat/cold intolerance.  EXT: No LE swelling  PSYCH: Negative    Active Problem List     Patient Active Problem List   Diagnosis     Lung nodule < 6cm on CT     Anxiety     Stenosis of carotid artery     Candidal intertrigo     Chronic back pain     Fatigue     Glaucoma     History of adenomatous polyp of colon     History of endovascular stent graft for abdominal aortic aneurysm     Mass of breast     Hypoferremia     Spinal stenosis of lumbar region     Microalbuminuria     Mild episode of recurrent major depressive disorder (H)     Multiple-type hyperlipidemia     Obstructive sleep apnea syndrome     Peripheral arterial occlusive disease (H)     Nondiabetic proliferative retinopathy     Rheumatoid arthritis (H)     Seasonal allergic rhinitis     Seizure disorder (H)     Type 2 diabetes mellitus (H)     Sick sinus syndrome (H)     Elevated LFTs     Physical  deconditioning     Chronic atrial fibrillation     Benign essential hypertension     Chronic diastolic congestive heart failure (H)     CKD (chronic kidney disease) stage 3, GFR 30-59 ml/min (H)     Anemia, unspecified type     Cognitive impairment     Hx of seizure disorder     Advanced directives, counseling/discussion     Falls frequently     Pulmonary nodules     Hx of encephalopathy     Clostridium difficile infection     Past Medical History     Past Medical History:   Diagnosis Date     Abdominal aortic stenosis      Anxiety      Basal cell carcinoma      Carotid stenosis      Cerebrovascular accident (CVA) (H) 8/19/2013     Claudication (H)      Closed supracondylar fracture of left humerus with routine healing, subsequent encounter 12/8/2017     Compression fracture      Depressive disorder      GI bleed      Glaucoma      Hyperlipidemia      Meniere syndrome      Obesity      Obstructive sleep apnea (adult) (pediatric)      Peripheral vascular disease, unspecified (H)      Pure hypercholesterolemia      Rheumatoid arthritis(714.0)      Seizure disorder (H)      Sjogren's disease (H)      Type II or unspecified type diabetes mellitus without mention of complication, not stated as uncontrolled      Unspecified cerebral artery occlusion with cerebral infarction      Unspecified epilepsy without mention of intractable epilepsy      Unspecified essential hypertension      Past Surgical History     Past Surgical History:   Procedure Laterality Date     ABDOMEN SURGERY      AAA Repair, abdominal aorta and illiac stents placed     BREAST SURGERY      Breast Biopsy     CARDIAC SURGERY      Aortic Stent Graft     CARDIAC SURGERY      Cardiac Catheterization     CATARACT IOL, RT/LT  2010    Right     GYN SURGERY      Hysterectomy     HEAD & NECK SURGERY      Tonsillectomy     HYSTERECTOMY      age 41     LASER YAG CAPSULOTOMY Bilateral 8/27/2015    Procedure: LASER YAG CAPSULOTOMY;  Surgeon: Gus Gregorio,  MD;  Location: [facility] OR     PHACOEMULSIFICATION WITH STANDARD INTRAOCULAR LENS IMPLANT  6/26/2014    Procedure: PHACOEMULSIFICATION WITH STANDARD INTRAOCULAR LENS IMPLANT;  Surgeon: Gus Gregorio MD;  Location: [facility] OR     TONSILLECTOMY      age 22     VASCULAR SURGERY      Angio extremity bilateral     Allergy     Allergies   Allergen Reactions     Rosiglitazone Hives     Ambien      Sleepwalking     Ambien [Zolpidem] Other (See Comments) and Unknown     Sleepwalking     Sulfa Drugs Unknown     Lightheaded, GI upset  Lightheaded, GI upset  Lightheaded, GI upset     Azithromycin Diarrhea     Clindamycin Diarrhea     Codeine Nausea and Vomiting     Other reaction(s): Vomiting  Other reaction(s): Vomiting     Lisinopril Cough     Nitrofurantoin Diarrhea     Simvastatin Diarrhea     Triamterene-Hctz [Hydrochlorothiazide W/Triamterene] Swelling and Nausea     Other reaction(s): Myalgia     Current Medication List     Current Outpatient Medications   Medication Sig     acetaminophen (TYLENOL) 325 MG tablet Take 650 mg by mouth 2 times daily And 650 mg po tid     acetaminophen (TYLENOL) 325 MG tablet Take 650 mg by mouth every 6 hours as needed for pain      amiodarone (PACERONE/CODARONE) 200 MG tablet Take 200 mg by mouth daily     amiodarone (PACERONE/CODARONE) 200 MG tablet Take 1 tablet (200 mg) by mouth daily     AMLODIPINE BESYLATE PO Take 10 mg by mouth daily      aspirin 81 MG EC tablet Take 81 mg by mouth daily     clotrimazole (LOTRIMIN) 1 % cream Apply topically 2 times daily as needed     CRANBERRY-VIT C-LACTOBACILLUS PO Take 1 tablet by mouth daily     diclofenac (VOLTAREN) 1 % topical gel Place onto the skin 4 times daily     DULoxetine (CYMBALTA) 60 MG EC capsule Take 60 mg by mouth 2 times daily     ergocalciferol (ERGOCALCIFEROL) 48762 units capsule Take 50,000 Units by mouth once a week     ferrous sulfate (IRON) 325 (65 Fe) MG tablet Take 325 mg by mouth every other day     fluticasone (FLOVENT  DISKUS) 50 MCG/BLIST AEPB Inhale 2 puffs into the lungs 2 times daily as needed     FOLIC ACID PO Take 1 mg by mouth daily     FUROSEMIDE PO Take 40 mg by mouth 2 times daily     GABAPENTIN PO Take 300 mg by mouth 3 times daily      hydrocortisone (CORTAID) 1 % cream Apply topically 2 times daily as needed     insulin aspart (NOVOLOG FLEXPEN) 100 UNIT/ML injection Inject 8 Units Subcutaneous 2 times daily (with meals) Give with lunch and dinner     insulin glargine (LANTUS) 100 UNIT/ML injection Inject 15 Units Subcutaneous At Bedtime      Lactobacillus (PROBIOTIC ACIDOPHILUS PO) Take 2 tablets by mouth daily     latanoprost (XALATAN) 0.005 % ophthalmic solution Place 1 drop into both eyes At Bedtime     leflunomide (ARAVA) 20 MG tablet Take 1 tablet (20 mg) by mouth daily . Labs needed every 8-12 weeks.     levETIRAcetam (KEPPRA) 500 MG tablet Take 500 mg by mouth 2 times daily     loperamide (IMODIUM A-D) 2 MG tablet Take 2 mg by mouth every 2 hours as needed for diarrhea     meclizine (ANTIVERT) 25 MG tablet Take 25 mg by mouth     MELATONIN PO Take 3 mg by mouth At Bedtime      menthol (ICY HOT) 5 % PTCH Apply 1 patch topically every 8 hours as needed for muscle soreness     METOPROLOL TARTRATE PO Take 100 mg by mouth 2 times daily      mupirocin (BACTROBAN) 2 % ointment Apply topically as needed     nitroglycerin (NITROSTAT) 0.4 MG sublingual tablet Place 0.4 mg under the tongue every 5 minutes as needed for chest pain For chest pain place 1 tablet under the tongue every 5 minutes for 3 doses. If symptoms persist 5 minutes after 1st dose call 911.     nystatin (MYCOSTATIN) 184030 UNIT/GM external powder Apply topically 2 times daily     OMEPRAZOLE PO Take 20 mg by mouth daily      ondansetron (ZOFRAN-ODT) 4 MG ODT tab Take 4 mg by mouth     POTASSIUM CHLORIDE PO Take 40 mEq by mouth daily     PRAVASTATIN SODIUM PO Take 40 mg by mouth every evening     senna-docusate (SENOKOT-S;PERICOLACE) 8.6-50 MG per tablet  "Take 1 tablet by mouth 2 times daily as needed      timolol (TIMOPTIC) 0.5 % ophthalmic solution Place 1 drop into both eyes 2 times daily Hold if HR <60     torsemide (DEMADEX) 20 MG tablet Take 20 mg by mouth     traMADol (ULTRAM) 50 MG tablet Take 1 tablet (50 mg) by mouth 2 times daily as needed for severe pain     TRAMADOL HCL PO Take 50 mg by mouth every 8 hours as needed for moderate to severe pain     triamcinolone (KENALOG) 0.1 % cream Apply topically 2 times daily     vancomycin (FIRVANQ) 25 MG/ML oral solution Take by mouth 4 times daily 5 ml po qid     Warfarin Sodium (COUMADIN PO) Take as directed by INR current dose 5mg PO Mon and Thur. And 3mg PO Tues, Wed, Fri, Sat, Sun next INR 1/2/18     No current facility-administered medications for this visit.          Social History   See HPI    Family History   Son: Inflammatory arthritis    Physical Exam     Temp Readings from Last 3 Encounters:   05/22/19 99.4  F (37.4  C)   05/17/19 98.2  F (36.8  C)   05/15/19 96  F (35.6  C)     BP Readings from Last 5 Encounters:   10/21/19 131/77   05/22/19 148/65   05/17/19 132/68   05/15/19 132/73   05/13/19 174/84     Pulse Readings from Last 1 Encounters:   10/21/19 64     Resp Readings from Last 1 Encounters:   05/22/19 20     Estimated body mass index is 30.79 kg/m  as calculated from the following:    Height as of this encounter: 1.651 m (5' 5\").    Weight as of this encounter: 83.9 kg (185 lb).    GEN: NAD  HEENT: MMM. No oral lesions. Anicteric, noninjected sclera  CV: S1, S2. RRR. No m/r/g.  PULM: CTA bilaterally. No w/c.  Using supplemental oxygen via nasal cannula  MSK: Tenderness to palpation without swelling of the right third and fourth PIPs and the left fourth PIP.  MCPs were without swelling or tenderness to palpation.  DIPs, wrists, elbows, shoulders, ankles, and MTPs without swelling or tenderness palpation.  Knees with medial joint line tenderness but no effusion or increased warmth; crepitation " bilaterally.   SKIN: No rash  EXT: No LE edema  PSYCH: Alert. Appropriate.    Labs / Imaging (select studies)   RF/CCP  Recent Labs   Lab Test 01/11/19  1013   CCPIGG 4   RHF <20     VIVEK  Recent Labs   Lab Test 01/11/19  1013   TIEN Positive*   ANAP1 HOMOGENEOUS   ANAT1 1:640     RNP/Sm/SSA/SSB  Recent Labs   Lab Test 01/11/19  1013   RNPIGG <0.2   SMIGG <0.2   SSAIGG 1.7*   SSBIGG <0.2   SCLIGG <0.2     dsDNA  Recent Labs   Lab Test 01/11/19  1013   DNA 6     C3/C4  Recent Labs   Lab Test 01/11/19  1013   H1EFZJZ 95   H8KQNXJ 18     Antiphospholipid Antibodies  Recent Labs   Lab Test 01/11/19  1013   B2GPG 1.4   B2GPM 0.9   CARDG <1.6   CARDM <0.2     CBC  Recent Labs   Lab Test 01/11/19  1013 05/14/18 08/16/17 01/03/17   WBC 4.9  --   --  7.7   RBC 3.77*  --   --  3.45*   HGB 11.9 9.7* 9.2* 10.4*   HCT 37.9  --   --  31.6*   *  --   --  92   RDW 15.5*  --   --  14.9*     --   --  203   MCH 31.6  --   --  30   MCHC 31.4*  --   --  33   NEUTROPHIL 69.9  --   --   --    LYMPH 15.4  --   --   --    MONOCYTE 12.5  --   --   --    EOSINOPHIL 2.0  --   --   --    BASOPHIL 0.2  --   --   --    ANEU 3.4  --   --   --    ALYM 0.8  --   --   --    PIPER 0.6  --   --   --    AEOS 0.1  --   --   --    ABAS 0.0  --   --   --      CMP  Recent Labs   Lab Test 06/17/19 05/13/19 05/10/19 03/25/19 01/11/19  1013 09/05/18 04/30/18 01/22/18 08/09/17 08/04/17   NA  --   --  142  --  141 139 141 139   < > 140   < > 138  138   POTASSIUM  --  3.8 3.2*  --  4.2 3.1* 3.5 4.0   < > 3.7   < > 4.2  4.2   CHLORIDE  --   --  102  --  105 95* 104 104  --  109*   < > 108*  108*   CO2  --   --  29  --  33* 33* 29 27  --  23   < > 22  22   ANIONGAP  --   --  11  --  3 11 8 8  --  8   < > 8  8   GLC  --   --  143*  --  93 274* 101 203*  --  73   < > 233*  233*   BUN  --   --  20  --  24 26 20 23  --  24   < > 30*  30*   CR  --   --  1.16*  --  1.10* 1.21* 1.10 1.20*   < > 1.22*   < > 1.34*  1.34*   GFRESTIMATED  --   --  45*   --  48* 43* 48* 44*   < > 43*   < > 39*  38*   GFRESTBLACK  --   --  55*  --  56* 62* 59* 53*   < > 52*   < > 47*  47*   DION  --   --  9.5  --  9.0 9.3 8.9 8.9  --  8.6   < > 8.7  8.7   BILITOTAL 0.6  --   --  0.3 0.5  --   --   --   --   --   --  0.4  0.4   ALBUMIN 4.0  --   --  3.5 3.8  --   --   --   --   --   --  3.1*  3.1*   PROTTOTAL 7.0  --   --  6.9* 7.6  --   --   --   --   --   --  6.1  6.1   ALKPHOS 83  --   --  110 157*  --   --   --   --   --   --  146*  146*   AST 18  --   --  17 22  --   --   --   --   --   --  24  24   ALT 18  --   --  12 29  --   --   --   --   --   --  71*  71*    < > = values in this interval not displayed.     HgA1c  Recent Labs   Lab Test 07/25/18 04/30/18   A1C 7.8* 10.0*     Calcium/VitaminD  Recent Labs   Lab Test 05/10/19 01/11/19  1013 09/05/18   DION 9.5 9.0 9.3     ESR/CRP  Recent Labs   Lab Test 01/11/19  1013   SED 17   CRP 4.7     CK/Aldolase  Recent Labs   Lab Test 01/11/19  1013   CKT 45     Hepatitis B  Recent Labs   Lab Test 01/11/19  1013   HBCAB Nonreactive   HEPBANG Nonreactive     Hepatitis C  Recent Labs   Lab Test 01/11/19  1013   HCVAB Nonreactive     Tuberculosis Screening  Recent Labs   Lab Test 01/11/19  1013   TBRES Negative     HIV Screening  Recent Labs   Lab Test 01/11/19  1013   HIAGAB Nonreactive     UA  Recent Labs   Lab Test 01/11/19  1007   COLOR Yellow   APPEARANCE Clear   URINEGLC Negative   URINEBILI Negative   SG 1.010   URINEPH 5.0   PROTEIN Negative   UROBILINOGEN 0.2   NITRITE Positive*   UBLD Trace*   LEUKEST Large*   WBCU *   RBCU O - 2   SQUAMOUSEPI Few   BACTERIA Many*     Urine Microscopic  Recent Labs   Lab Test 01/11/19  1007   WBCU *   RBCU O - 2   SQUAMOUSEPI Few   BACTERIA Many*     Urine Protein  Recent Labs   Lab Test 01/11/19  1007   UTP 0.15   UTPG 0.42*     Immunization History     Immunization History   Administered Date(s) Administered     Influenza (H1N1) 12/15/2009     Influenza (High Dose) 3 valent  vaccine 10/27/2003, 10/25/2005, 10/30/2006, 10/08/2007, 10/01/2008, 09/30/2009, 10/14/2014, 10/30/2015, 10/13/2016, 09/05/2017, 10/10/2018     Influenza (IIV3) PF 10/27/2003, 10/25/2005, 10/30/2006, 10/08/2007, 10/01/2008, 09/30/2009, 09/29/2010, 10/10/2011, 09/18/2013, 10/14/2014, 10/30/2015, 10/13/2016     Pneumo Conj 13-V (2010&after) 05/18/2015     Pneumococcal 23 valent 11/01/2000, 10/30/2006, 09/29/2010          Chart documentation done in part with Dragon Voice recognition Software. Although reviewed after completion, some word and grammatical error may remain.    Jim Hunter MD

## 2019-10-22 ENCOUNTER — RECORDS - HEALTHEAST (OUTPATIENT)
Dept: LAB | Facility: CLINIC | Age: 79
End: 2019-10-22

## 2019-10-22 LAB
BACTERIA SPEC CULT: NORMAL
C3 SERPL-MCNC: 109 MG/DL (ref 76–169)
C4 SERPL-MCNC: 25 MG/DL (ref 15–50)
DSDNA AB SER-ACNC: 4 IU/ML
SPECIMEN SOURCE: NORMAL

## 2019-10-23 LAB — INR PPP: 3.48 (ref 0.9–1.1)

## 2019-10-30 ENCOUNTER — RECORDS - HEALTHEAST (OUTPATIENT)
Dept: LAB | Facility: CLINIC | Age: 79
End: 2019-10-30

## 2019-10-30 LAB — INR PPP: 3.96 (ref 0.9–1.1)

## 2019-11-05 ENCOUNTER — RECORDS - HEALTHEAST (OUTPATIENT)
Dept: LAB | Facility: CLINIC | Age: 79
End: 2019-11-05

## 2019-11-06 LAB — INR PPP: 3.65 (ref 0.9–1.1)

## 2019-11-12 ENCOUNTER — RECORDS - HEALTHEAST (OUTPATIENT)
Dept: LAB | Facility: CLINIC | Age: 79
End: 2019-11-12

## 2019-11-13 LAB — INR PPP: 4.92 (ref 0.9–1.1)

## 2019-11-20 ENCOUNTER — RECORDS - HEALTHEAST (OUTPATIENT)
Dept: LAB | Facility: CLINIC | Age: 79
End: 2019-11-20

## 2019-11-20 LAB — INR PPP: 1.52 (ref 0.9–1.1)

## 2019-12-02 ENCOUNTER — NURSING HOME VISIT (OUTPATIENT)
Dept: GERIATRICS | Facility: CLINIC | Age: 79
End: 2019-12-02
Payer: MEDICARE

## 2019-12-02 VITALS
OXYGEN SATURATION: 92 % | WEIGHT: 149.2 LBS | BODY MASS INDEX: 24.83 KG/M2 | RESPIRATION RATE: 17 BRPM | HEART RATE: 78 BPM | TEMPERATURE: 97.8 F | DIASTOLIC BLOOD PRESSURE: 72 MMHG | SYSTOLIC BLOOD PRESSURE: 135 MMHG

## 2019-12-02 DIAGNOSIS — Z79.4 TYPE 2 DIABETES MELLITUS WITH STAGE 3 CHRONIC KIDNEY DISEASE, WITH LONG-TERM CURRENT USE OF INSULIN (H): ICD-10-CM

## 2019-12-02 DIAGNOSIS — N18.30 CKD (CHRONIC KIDNEY DISEASE) STAGE 3, GFR 30-59 ML/MIN (H): ICD-10-CM

## 2019-12-02 DIAGNOSIS — M54.50 CHRONIC MIDLINE LOW BACK PAIN WITHOUT SCIATICA: ICD-10-CM

## 2019-12-02 DIAGNOSIS — G89.29 CHRONIC MIDLINE LOW BACK PAIN WITHOUT SCIATICA: ICD-10-CM

## 2019-12-02 DIAGNOSIS — I48.20 CHRONIC ATRIAL FIBRILLATION (H): ICD-10-CM

## 2019-12-02 DIAGNOSIS — Z95.0 PRESENCE OF CARDIAC PACEMAKER: ICD-10-CM

## 2019-12-02 DIAGNOSIS — N18.30 TYPE 2 DIABETES MELLITUS WITH STAGE 3 CHRONIC KIDNEY DISEASE, WITH LONG-TERM CURRENT USE OF INSULIN (H): ICD-10-CM

## 2019-12-02 DIAGNOSIS — I10 BENIGN ESSENTIAL HYPERTENSION: ICD-10-CM

## 2019-12-02 DIAGNOSIS — I50.32 CHRONIC DIASTOLIC CONGESTIVE HEART FAILURE (H): Primary | ICD-10-CM

## 2019-12-02 DIAGNOSIS — R63.4 WEIGHT LOSS: ICD-10-CM

## 2019-12-02 DIAGNOSIS — E11.22 TYPE 2 DIABETES MELLITUS WITH STAGE 3 CHRONIC KIDNEY DISEASE, WITH LONG-TERM CURRENT USE OF INSULIN (H): ICD-10-CM

## 2019-12-02 DIAGNOSIS — I25.10 ASCVD (ARTERIOSCLEROTIC CARDIOVASCULAR DISEASE): ICD-10-CM

## 2019-12-02 DIAGNOSIS — D64.9 CHRONIC ANEMIA: ICD-10-CM

## 2019-12-02 PROBLEM — I50.33 ACUTE ON CHRONIC DIASTOLIC (CONGESTIVE) HEART FAILURE (H): Status: ACTIVE | Noted: 2019-11-22

## 2019-12-02 PROBLEM — F03.90 DEMENTIA (H): Status: ACTIVE | Noted: 2019-12-02

## 2019-12-02 PROCEDURE — 99309 SBSQ NF CARE MODERATE MDM 30: CPT | Performed by: NURSE PRACTITIONER

## 2019-12-02 RX ORDER — BUMETANIDE 1 MG/1
1 TABLET ORAL 2 TIMES DAILY
COMMUNITY

## 2019-12-02 RX ORDER — PANTOPRAZOLE SODIUM 40 MG/1
40 TABLET, DELAYED RELEASE ORAL DAILY
COMMUNITY

## 2019-12-02 RX ORDER — LIDOCAINE 4 G/G
1 PATCH TOPICAL DAILY PRN
COMMUNITY
End: 2020-01-14

## 2019-12-02 RX ORDER — ISOSORBIDE MONONITRATE 30 MG/1
30 TABLET, EXTENDED RELEASE ORAL DAILY
COMMUNITY
End: 2019-12-23

## 2019-12-02 RX ORDER — POLYETHYLENE GLYCOL 3350 17 G/17G
17 POWDER, FOR SOLUTION ORAL DAILY PRN
COMMUNITY

## 2019-12-02 NOTE — PROGRESS NOTES
[facility]  PRIMARY CARE PROVIDER AND CLINIC:  Ga Allen, [facility] [address] [facility] / [facility] [id number]  Chief Complaint   Patient presents with     Hospital F/U     [facility] Medical Record Number:  [id number]  Place of Service where encounter took place:  [facility] ([facility]) [[id number]]    Diya Cruz  is a 79 year old  (1940), admitted to the above facility from  [facility]. Hospital stay 11/21/19 through 11/30/19..  Admitted to this facility for  rehab, medical management and nursing care.    HPI information obtained from: patient report and Care Everywhere [facility] chart review.   Brief Summary of Hospital Course: Patient was admitted to [facility] from 11/13 to 11/18 with diarrhea, syncope and anemia.  She was discharged back to her assisted living apartment.  And went back to [facility] on 11/21 for nausea and CHF exacerbation.  She underwent an EGD on 11/29 this indicated possible Candida esophagitis.  But was not confirmed prior to discharge.  She was started on Protonix daily.  GI is delaying treatment of Candida esophagitis until confirmed and they will update patient.  MEDICATION CHANGES: Butrans was stopped due to nausea and weight loss.  started on Protonix and omeprazole stopped.  Torsemide stopped and Bumex started.  Started on Imdur due to complaints of chest pain prior to hospital admission.   RECOMMENDED FOLLOW UP:Pancreatic lesions were noted and a six-month follow-up was recommended.   Updates on Status Since Skilled nursing Admission: 12/2 patient is on 3 L of oxygen nasal cannula.  Complains of back pain    CODE STATUS/ADVANCE DIRECTIVES DISCUSSION:   CPR/Full code -previously DNR/DNI.  CODE STATUS updated in [facility].  Patient's living condition: lives in an assisted living facility    ALLERGIES: Rosiglitazone; Ambien; Ambien [zolpidem]; Sulfa drugs; Azithromycin; Clindamycin; Codeine; Lisinopril; Nitrofurantoin; Simvastatin; and  Triamterene-hctz [hydrochlorothiazide w/triamterene]  PAST MEDICAL HISTORY:  has a past medical history of Abdominal aortic stenosis, Anxiety, Basal cell carcinoma, Carotid stenosis, Cerebrovascular accident (CVA) (H) (8/19/2013), Claudication (H), Closed supracondylar fracture of left humerus with routine healing, subsequent encounter (12/8/2017), Compression fracture, Depressive disorder, GI bleed, Glaucoma, Hyperlipidemia, Meniere syndrome, Obesity, Obstructive sleep apnea (adult) (pediatric), Peripheral vascular disease, unspecified (H), Pure hypercholesterolemia, Rheumatoid arthritis(714.0), Seizure disorder (H), Sjogren's disease (H), Type II or unspecified type diabetes mellitus without mention of complication, not stated as uncontrolled, Unspecified cerebral artery occlusion with cerebral infarction, Unspecified epilepsy without mention of intractable epilepsy, and Unspecified essential hypertension.  PAST SURGICAL HISTORY:   has a past surgical history that includes tonsillectomy; Hysterectomy; GYN surgery; Breast surgery; Head and neck surgery; Cardiac surgery; Cardiac surgery; Abdomen surgery; vascular surgery; Phacoemulsification with standard intraocular lens implant (6/26/2014); cataract iol, rt/lt (2010); and Laser YAG capsulotomy (Bilateral, 8/27/2015).  FAMILY HISTORY: family history is not on file.  SOCIAL HISTORY:   reports that she has quit smoking. Her smoking use included cigarettes. She has a 30.00 pack-year smoking history. She has never used smokeless tobacco. She reports that she does not drink alcohol or use drugs.    Post Discharge Medication Reconciliation Status: discharge medications reconciled, continue medications without change  Current Outpatient Medications   Medication Sig Dispense Refill     acetaminophen (TYLENOL) 325 MG tablet Take 650 mg by mouth 3 times daily        acetaminophen (TYLENOL) 325 MG tablet Take 650 mg by mouth every 12 hours as needed for pain        amiodarone  (PACERONE/CODARONE) 200 MG tablet Take 1 tablet (200 mg) by mouth daily 30 tablet 0     AMLODIPINE BESYLATE PO Take 5 mg by mouth daily        aspirin 81 MG EC tablet Take 81 mg by mouth daily       bumetanide (BUMEX) 1 MG tablet Take 1 mg by mouth 2 times daily       CRANBERRY-VIT C-LACTOBACILLUS PO Take 1 tablet by mouth daily       diclofenac (VOLTAREN) 1 % topical gel Place onto the skin 4 times daily as needed (also HS scheduled)        DULoxetine (CYMBALTA) 60 MG EC capsule Take 60 mg by mouth 2 times daily       ergocalciferol (ERGOCALCIFEROL) 12915 units capsule Take 50,000 Units by mouth once a week       ferrous sulfate (IRON) 325 (65 Fe) MG tablet Take 325 mg by mouth daily        fluticasone (FLOVENT DISKUS) 50 MCG/BLIST AEPB Inhale 2 puffs into the lungs daily        FOLIC ACID PO Take 1 mg by mouth daily       GABAPENTIN PO Take 300 mg by mouth 3 times daily        insulin aspart (NOVOLOG FLEXPEN) 100 UNIT/ML injection Inject 10 Units Subcutaneous 3 times daily (with meals) Also follow ssi: 0 - 69 = 0 SEE HYPOGLYCEMIA PROTOCOL; 70 - 149 = 0 NO INSULIN, GIVE PRANDIAL INSULIN IF ORDERED ; 150 - 199 = 2; 200 - 249 = 4; 250 - 299 = 6; 300 - 349 = 8; 350 - 399 = 10; 400+ = 12 GIVE 12 UNITS AND CALL MD, subcutaneously three times a day       insulin glargine (LANTUS) 100 UNIT/ML injection Inject 15 Units Subcutaneous At Bedtime        isosorbide mononitrate (IMDUR) 30 MG 24 hr tablet Take 30 mg by mouth daily       Lactobacillus (PROBIOTIC ACIDOPHILUS PO) Take 1 tablet by mouth daily        latanoprost (XALATAN) 0.005 % ophthalmic solution Place 1 drop into both eyes At Bedtime       leflunomide (ARAVA) 20 MG tablet Take 1 tablet (20 mg) by mouth daily . Labs needed every 8-12 weeks. 90 tablet 1     levETIRAcetam (KEPPRA) 500 MG tablet Take 500 mg by mouth 2 times daily       Lidocaine (LIDOCARE) 4 % Patch Place 1 patch onto the skin daily as needed for moderate pain To prevent lidocaine toxicity, patient  should be patch free for 12 hrs daily.       loperamide (IMODIUM A-D) 2 MG tablet Take 2 mg by mouth every 2 hours as needed for diarrhea       meclizine (ANTIVERT) 25 MG tablet Take 25 mg by mouth 3 times daily as needed        MELATONIN PO Take 3 mg by mouth At Bedtime        menthol (ICY HOT) 5 % PTCH Apply 1 patch topically every 8 hours as needed for muscle soreness       METOPROLOL TARTRATE PO Take 50 mg by mouth 2 times daily        mupirocin (BACTROBAN) 2 % ointment Apply topically 3 times daily as needed        nitroglycerin (NITROSTAT) 0.4 MG sublingual tablet Place 0.4 mg under the tongue every 5 minutes as needed for chest pain For chest pain place 1 tablet under the tongue every 5 minutes for 3 doses. If symptoms persist 5 minutes after 1st dose call 911.       nystatin (MYCOSTATIN) 874281 UNIT/GM external powder Apply topically 2 times daily as needed        ondansetron (ZOFRAN-ODT) 4 MG ODT tab Take 4 mg by mouth every 8 hours as needed        pantoprazole (PROTONIX) 40 MG EC tablet Take 40 mg by mouth daily       polyethylene glycol (MIRALAX/GLYCOLAX) packet Take 17 g by mouth daily as needed for constipation       POTASSIUM CHLORIDE PO Take 40 mEq by mouth 2 times daily        PRAVASTATIN SODIUM PO Take 40 mg by mouth every evening       senna-docusate (SENOKOT-S;PERICOLACE) 8.6-50 MG per tablet Take 1 tablet by mouth 2 times daily as needed        timolol (TIMOPTIC) 0.5 % ophthalmic solution Place 1 drop into both eyes 2 times daily Hold if HR <60       TRAMADOL HCL PO Take 50 mg by mouth every 6 hours as needed for moderate to severe pain        triamcinolone (KENALOG) 0.1 % cream Apply topically 3 times daily as needed        Warfarin Sodium (COUMADIN PO) Take as directed by INR current dose 5mg PO Mon and Thur. And 3mg PO Tues, Wed, Fri, Sat, Sun next INR 1/2/18       amiodarone (PACERONE/CODARONE) 200 MG tablet Take 200 mg by mouth daily       clotrimazole (LOTRIMIN) 1 % cream Apply topically 2  times daily as needed       FUROSEMIDE PO Take 40 mg by mouth 2 times daily       hydrocortisone (CORTAID) 1 % cream Apply topically 2 times daily as needed       OMEPRAZOLE PO Take 20 mg by mouth daily        torsemide (DEMADEX) 20 MG tablet Take 20 mg by mouth       traMADol (ULTRAM) 50 MG tablet Take 1 tablet (50 mg) by mouth 2 times daily as needed for severe pain 30 tablet 0       REVIEW OF SYSTEMS:   4 point ROS including Respiratory, CV, GI and , other than that noted in the HPI,  is negative    PHYSICAL EXAM:  /72   Pulse 78   Temp 97.8  F (36.6  C)   Resp 17   Wt 67.7 kg (149 lb 3.2 oz)   SpO2 92%   BMI 24.83 kg/m    GENERAL APPEARANCE:  Alert, in no distress  RESP:  respiratory effort and palpation of chest normal, no respiratory distress, lungs sounds crackles right posterior bAse  CV:  Palpation and auscultation of heart done , regular rate and rhythm, no murmur, rub, or gallop, edema none  ABDOMEN:  normal bowel sounds, soft, nontender,   M/S:  Gait and station observed in wheelchair and in bed, no tenderness or swelling of the joints   SKIN:  Inspection and palpation of skin and subcutaneous tissue at baseline  PSYCH:  insight and judgement, memory mild to moderate impaired, affect and mood normal    ASSESSMENT/PLAN:  Chronic diastolic congestive heart failure (H)  Stable.  Torsemide discontinued and Bumex started at the hospital.  Was requiring 5 to 7 L of oxygen at the hospital.  Now at 3 L continuous.  Home prescription is for 2 L nasal cannula continuous.    ASCVD (arteriosclerotic cardiovascular disease)  Initially taken to the hospital for chest pain with exertion.  She has known occluded right coronary.  Imdur was added at the hospital and she has a follow-up with cardiology on 12/11.    CKD (chronic kidney disease) stage 3, GFR 30-59 ml/min (H)  Kidney function was stable during her hospital stay.  She has a repeat BMP pending today.  She was discharged on potassium 40 mEq 2 times  a day.  Last clinic note indicates she was on 40 mEq every day.     Benign essential hypertension  Controlled.  Continue amlodipine, Bumex, Imdur, metoprolol.    Chronic atrial fibrillation  Presence of cardiac pacemaker  Rate controlled.  Has pacemaker.  On warfarin for clot prevention.  INR today was 2.0 continue home dosing of 2.5 mg Monday Tuesday Thursday Sunday and 1.25 mg Wednesday Friday Sunday.    Type 2 diabetes mellitus with stage 3 chronic kidney disease, with long-term current use of insulin (H)  hgb a1c on 10/30/2019 was 5.5 indicating that she is likely experiencing hypoglycemia. She is currently on lantus 25 units once daily and aspart 10 units TID with meals and sliding scale.     Chronic midline low back pain without sciatica  Home regimen was butrans with PRN tramadol and scheduled tylenol. butrans was discontinued at the hospital as it was felt to be contributing to nausea and weight loss. Patient states that her pain hasn't changed since stopping it. She likes to lie down to help with her back pain. No changes.    Chronic anemia  guaic stool was negative at hospital. Iron studies were normal. Continue to monitor.     Weight loss  Wt Readings from Last 4 Encounters:   12/02/19 67.7 kg (149 lb 3.2 oz)   10/21/19 83.9 kg (185 lb)   05/22/19 74.4 kg (164 lb)   05/17/19 77.1 kg (170 lb)    No specific cause found at hospital. EGD done and showed possible candida gastrities. Ct scanned showed pancreatic lesions and recommended follow up in 6 months. Patient states she ats ok but I suspect that she is feeling full early and does not realize how much she isnt eating. She also relays that she didn't realize she was loosing weight until her hospital stay.     Cognitive impairment.   Resides in assisted living with medication management.     Electronically signed by:  Manda MONROE CNP    Orders written by provider at facility and transcribed by : Virginia Argueta MA  1.  INR 2.0 -  Warfarin 2.5 mg po Mon, Tues, Thur, and 1.25 mg po Wed.  2.  Recheck INR on Friday.

## 2019-12-09 ENCOUNTER — NURSING HOME VISIT (OUTPATIENT)
Dept: GERIATRICS | Facility: CLINIC | Age: 79
End: 2019-12-09
Payer: MEDICARE

## 2019-12-09 VITALS
HEART RATE: 60 BPM | OXYGEN SATURATION: 92 % | WEIGHT: 151 LBS | DIASTOLIC BLOOD PRESSURE: 76 MMHG | TEMPERATURE: 98.3 F | RESPIRATION RATE: 18 BRPM | HEIGHT: 65 IN | BODY MASS INDEX: 25.16 KG/M2 | SYSTOLIC BLOOD PRESSURE: 127 MMHG

## 2019-12-09 DIAGNOSIS — Z79.4 TYPE 2 DIABETES MELLITUS WITH STAGE 3 CHRONIC KIDNEY DISEASE, WITH LONG-TERM CURRENT USE OF INSULIN (H): ICD-10-CM

## 2019-12-09 DIAGNOSIS — I48.20 CHRONIC ATRIAL FIBRILLATION (H): ICD-10-CM

## 2019-12-09 DIAGNOSIS — I50.32 CHRONIC DIASTOLIC CONGESTIVE HEART FAILURE (H): ICD-10-CM

## 2019-12-09 DIAGNOSIS — I10 BENIGN ESSENTIAL HYPERTENSION: ICD-10-CM

## 2019-12-09 DIAGNOSIS — G89.29 CHRONIC MIDLINE LOW BACK PAIN WITHOUT SCIATICA: Primary | ICD-10-CM

## 2019-12-09 DIAGNOSIS — N18.30 TYPE 2 DIABETES MELLITUS WITH STAGE 3 CHRONIC KIDNEY DISEASE, WITH LONG-TERM CURRENT USE OF INSULIN (H): ICD-10-CM

## 2019-12-09 DIAGNOSIS — I25.10 ASCVD (ARTERIOSCLEROTIC CARDIOVASCULAR DISEASE): ICD-10-CM

## 2019-12-09 DIAGNOSIS — N18.30 CKD (CHRONIC KIDNEY DISEASE) STAGE 3, GFR 30-59 ML/MIN (H): ICD-10-CM

## 2019-12-09 DIAGNOSIS — D64.9 CHRONIC ANEMIA: ICD-10-CM

## 2019-12-09 DIAGNOSIS — E11.22 TYPE 2 DIABETES MELLITUS WITH STAGE 3 CHRONIC KIDNEY DISEASE, WITH LONG-TERM CURRENT USE OF INSULIN (H): ICD-10-CM

## 2019-12-09 DIAGNOSIS — R63.4 WEIGHT LOSS: ICD-10-CM

## 2019-12-09 DIAGNOSIS — Z95.0 PRESENCE OF CARDIAC PACEMAKER: ICD-10-CM

## 2019-12-09 DIAGNOSIS — M54.50 CHRONIC MIDLINE LOW BACK PAIN WITHOUT SCIATICA: Primary | ICD-10-CM

## 2019-12-09 PROCEDURE — 99309 SBSQ NF CARE MODERATE MDM 30: CPT | Performed by: NURSE PRACTITIONER

## 2019-12-09 ASSESSMENT — MIFFLIN-ST. JEOR: SCORE: 1170.81

## 2019-12-09 NOTE — PROGRESS NOTES
[place] [facility]  PRIMARY CARE PROVIDER AND CLINIC:  Ga Allen, [facility] [address] [facility] / [facility] [address]  Chief Complaint   Patient presents with     RECHECK     [place] Medical Record Number:  [id number]  Place of Service where encounter took place:  [facility] [[id number]]    Diya Cruz  is a 79 year old  (1940), admitted to the above facility from  [facility]. Hospital stay 11/21/19 through 11/30/19..  Admitted to this facility for  rehab, medical management and nursing care.    HPI information obtained from: patient report and Care Everywhere [facility] chart review.   Brief Summary of Hospital Course: Patient was admitted to [facility] from 11/13 to 11/18 with diarrhea, syncope and anemia.  She was discharged back to her assisted living apartment.  And went back to [facility] on 11/21 for nausea and CHF exacerbation.  She underwent an EGD on 11/29 this indicated possible Candida esophagitis.  But was not confirmed prior to discharge.  She was started on Protonix daily.  GI is delaying treatment of Candida esophagitis until confirmed and they will update patient.  MEDICATION CHANGES: Butrans was stopped due to nausea and weight loss.  started on Protonix and omeprazole stopped.  Torsemide stopped and Bumex started.  Started on Imdur due to complaints of chest pain prior to hospital admission.   RECOMMENDED FOLLOW UP:Pancreatic lesions were noted and a six-month follow-up was recommended.   Updates on Status Since Skilled nursing Admission:   12/2 patient is on 3 L of oxygen nasal cannula.  Complains of back pain. Warfarin adjusted based on INR.   12/9 patient had a fall last week without injury. complains of muscle spasm.       CODE STATUS/ADVANCE DIRECTIVES DISCUSSION:   CPR/Full code -previously DNR/DNI.  CODE STATUS updated in [facility].  Patient's living condition: lives in an assisted living facility    ALLERGIES: Rosiglitazone; Ambien;  Ambien [zolpidem]; Sulfa drugs; Azithromycin; Clindamycin; Codeine; Lisinopril; Nitrofurantoin; Simvastatin; and Triamterene-hctz [hydrochlorothiazide w/triamterene]  PAST MEDICAL HISTORY:  has a past medical history of Abdominal aortic stenosis, Anxiety, Basal cell carcinoma, Carotid stenosis, Cerebrovascular accident (CVA) (H) (8/19/2013), Claudication (H), Closed supracondylar fracture of left humerus with routine healing, subsequent encounter (12/8/2017), Compression fracture, Depressive disorder, GI bleed, Glaucoma, Hyperlipidemia, Meniere syndrome, Obesity, Obstructive sleep apnea (adult) (pediatric), Peripheral vascular disease, unspecified (H), Pure hypercholesterolemia, Rheumatoid arthritis(714.0), Seizure disorder (H), Sjogren's disease (H), Type II or unspecified type diabetes mellitus without mention of complication, not stated as uncontrolled, Unspecified cerebral artery occlusion with cerebral infarction, Unspecified epilepsy without mention of intractable epilepsy, and Unspecified essential hypertension.  PAST SURGICAL HISTORY:   has a past surgical history that includes tonsillectomy; Hysterectomy; GYN surgery; Breast surgery; Head and neck surgery; Cardiac surgery; Cardiac surgery; Abdomen surgery; vascular surgery; Phacoemulsification with standard intraocular lens implant (6/26/2014); cataract iol, rt/lt (2010); and Laser YAG capsulotomy (Bilateral, 8/27/2015).  FAMILY HISTORY: family history is not on file.  SOCIAL HISTORY:   reports that she has quit smoking. Her smoking use included cigarettes. She has a 30.00 pack-year smoking history. She has never used smokeless tobacco. She reports that she does not drink alcohol or use drugs.    Current Outpatient Medications   Medication Sig Dispense Refill     acetaminophen (TYLENOL) 325 MG tablet Take 650 mg by mouth 3 times daily        acetaminophen (TYLENOL) 325 MG tablet Take 650 mg by mouth every 12 hours as needed for pain        amiodarone  (PACERONE/CODARONE) 200 MG tablet Take 1 tablet (200 mg) by mouth daily 30 tablet 0     AMLODIPINE BESYLATE PO Take 5 mg by mouth daily        aspirin 81 MG EC tablet Take 81 mg by mouth daily       bumetanide (BUMEX) 1 MG tablet Take 1 mg by mouth 2 times daily       CRANBERRY-VIT C-LACTOBACILLUS PO Take 1 tablet by mouth daily       diclofenac (VOLTAREN) 1 % topical gel Place 2 g onto the skin 4 times daily as needed (also HS scheduled) And also 2 gram topically at bedtime       DULoxetine (CYMBALTA) 60 MG EC capsule Take 60 mg by mouth 2 times daily       ergocalciferol (ERGOCALCIFEROL) 42745 units capsule Take 50,000 Units by mouth once a week       ferrous sulfate (IRON) 325 (65 Fe) MG tablet Take 325 mg by mouth daily        fluticasone (FLOVENT DISKUS) 50 MCG/BLIST AEPB Inhale 2 puffs into the lungs daily        FOLIC ACID PO Take 1 mg by mouth daily       GABAPENTIN PO Take 300 mg by mouth 3 times daily        insulin aspart (NOVOLOG FLEXPEN) 100 UNIT/ML injection Inject 10 Units Subcutaneous 3 times daily (with meals) Also follow ssi: 0 - 69 = 0 SEE HYPOGLYCEMIA PROTOCOL; 70 - 149 = 0 NO INSULIN, GIVE PRANDIAL INSULIN IF ORDERED ; 150 - 199 = 2; 200 - 249 = 4; 250 - 299 = 6; 300 - 349 = 8; 350 - 399 = 10; 400+ = 12 GIVE 12 UNITS AND CALL MD, subcutaneously three times a day       insulin glargine (LANTUS) 100 UNIT/ML injection Inject 12 Units Subcutaneous At Bedtime        isosorbide mononitrate (IMDUR) 30 MG 24 hr tablet Take 30 mg by mouth daily       Lactobacillus (PROBIOTIC ACIDOPHILUS PO) Take 1 tablet by mouth daily        latanoprost (XALATAN) 0.005 % ophthalmic solution Place 1 drop into both eyes At Bedtime       leflunomide (ARAVA) 20 MG tablet Take 1 tablet (20 mg) by mouth daily . Labs needed every 8-12 weeks. 90 tablet 1     levETIRAcetam (KEPPRA) 500 MG tablet Take 500 mg by mouth 2 times daily       Lidocaine (LIDOCARE) 4 % Patch Place 1 patch onto the skin daily as needed for moderate pain  To prevent lidocaine toxicity, patient should be patch free for 12 hrs daily.       loperamide (IMODIUM A-D) 2 MG tablet Take 2 mg by mouth every 2 hours as needed for diarrhea       meclizine (ANTIVERT) 25 MG tablet Take 25 mg by mouth 3 times daily as needed        MELATONIN PO Take 3 mg by mouth At Bedtime        menthol (ICY HOT) 5 % PTCH Apply 1 patch topically every 8 hours as needed for muscle soreness       METOPROLOL TARTRATE PO Take 50 mg by mouth 2 times daily        mupirocin (BACTROBAN) 2 % ointment Apply topically 3 times daily as needed        nitroglycerin (NITROSTAT) 0.4 MG sublingual tablet Place 0.4 mg under the tongue every 5 minutes as needed for chest pain For chest pain place 1 tablet under the tongue every 5 minutes for 3 doses. If symptoms persist 5 minutes after 1st dose call 911.       nystatin (MYCOSTATIN) 871383 UNIT/GM external powder Apply topically 2 times daily as needed        ondansetron (ZOFRAN-ODT) 4 MG ODT tab Take 4 mg by mouth every 8 hours as needed        pantoprazole (PROTONIX) 40 MG EC tablet Take 40 mg by mouth daily       polyethylene glycol (MIRALAX/GLYCOLAX) packet Take 17 g by mouth daily as needed for constipation       POTASSIUM CHLORIDE PO Take 40 mEq by mouth 2 times daily        PRAVASTATIN SODIUM PO Take 40 mg by mouth every evening       senna-docusate (SENOKOT-S;PERICOLACE) 8.6-50 MG per tablet Take 1 tablet by mouth 2 times daily as needed        timolol (TIMOPTIC) 0.5 % ophthalmic solution Place 1 drop into both eyes 2 times daily Hold if HR <60       tiZANidine (ZANAFLEX) 2 MG tablet Take 2 mg by mouth every 6 hours as needed       TRAMADOL HCL PO Take 50 mg by mouth every 6 hours as needed for moderate to severe pain        triamcinolone (KENALOG) 0.1 % cream Apply topically 3 times daily as needed        Warfarin Sodium (COUMADIN PO) Take 2.5 mg by mouth daily Take every Tues/Wed/Thur/Fri/Sat/Sun - Recheck INR 12/15/2019       calcium carbonate (TUMS) 500  "MG chewable tablet Take 1 chew tab by mouth 4 times daily as needed for heartburn       fluconazole (DIFLUCAN) 100 MG tablet Take 100 mg by mouth daily       insulin aspart (NOVOLOG FLEXPEN) 100 UNIT/ML pen Inject 10 Units Subcutaneous 3 times daily (with meals)         REVIEW OF SYSTEMS:   4 point ROS including Respiratory, CV, GI and , other than that noted in the HPI,  is negative    PHYSICAL EXAM:  /76   Pulse 60   Temp 98.3  F (36.8  C)   Resp 18   Ht 1.651 m (5' 5\")   Wt 68.5 kg (151 lb)   SpO2 92%   BMI 25.13 kg/m    GENERAL APPEARANCE:  Alert, in no distress  RESP:  respiratory effort and palpation of chest normal, no respiratory distress, lungs sounds crackles right posterior bAse  CV:  Palpation and auscultation of heart done , regular rate and rhythm, no murmur, rub, or gallop, edema none  ABDOMEN:  normal bowel sounds, soft, nontender,   M/S:  Gait and station observed in wheelchair and in bed, no tenderness or swelling of the joints   SKIN:  Inspection and palpation of skin and subcutaneous tissue at baseline  PSYCH:  insight and judgement, memory mild to moderate impaired, affect and mood normal    ASSESSMENT/PLAN:  Chronic midline low back pain without sciatica  Home regimen was butrans with PRN tramadol and scheduled tylenol. butrans was discontinued at the hospital as it was felt to be contributing to nausea and weight loss. Patient states that her pain hasn't changed since stopping it. Today she complains of muscle spasm. Reviewed risk and benefits of muscle relaxer. She doesn't think she has used one for several years. Will add tizanidine 2 mg po every 6 hours PRN for muscle spasm    Type 2 diabetes mellitus with stage 3 chronic kidney disease, with long-term current use of insulin (H)  hgb a1c on 10/30/2019 was 5.5 indicating that she is likely experiencing hypoglycemia. She is currently on lantus 15 units once daily and aspart 10 units TID with meals and sliding scale. Blood " sugars in the AM have been 68 - 118. Encouraged PM snack and will decrease lantus.     Chronic diastolic congestive heart failure (H)  Stable.  Torsemide discontinued and Bumex started at the hospital.  Was requiring 5 to 7 L of oxygen at the hospital.  Now at 3 L continuous.  Home prescription is for 2 L nasal cannula continuous.    ASCVD (arteriosclerotic cardiovascular disease)  Initially taken to the hospital for chest pain with exertion.  She has known occluded right coronary.  Imdur was added at the hospital and she has a follow-up with cardiology on 12/11.    CKD (chronic kidney disease) stage 3, GFR 30-59 ml/min (H)  Kidney function was stable during her hospital stay.  She has a repeat BMP pending today.  She was discharged on potassium 40 mEq 2 times a day.  Last clinic note indicates she was on 40 mEq every day.     Benign essential hypertension  Controlled.  Continue amlodipine, Bumex, Imdur, metoprolol.    Chronic atrial fibrillation  Presence of cardiac pacemaker  Rate controlled.  Has pacemaker.  On warfarin for clot prevention.  INR today was 2.0 continue home dosing of 2.5 mg Monday Tuesday Thursday Sunday and 1.25 mg Wednesday Friday Sunday.      Chronic midline low back pain without sciatica  Home regimen was butrans with PRN tramadol and scheduled tylenol. butrans was discontinued at the hospital as it was felt to be contributing to nausea and weight loss. Patient states that her pain hasn't changed since stopping it. She likes to lie down to help with her back pain. No changes.    Chronic anemia  guaic stool was negative at hospital. Iron studies were normal. Continue to monitor.     Weight loss  Wt Readings from Last 4 Encounters:   12/09/19 68.5 kg (151 lb)   12/02/19 67.7 kg (149 lb 3.2 oz)   10/21/19 83.9 kg (185 lb)   05/22/19 74.4 kg (164 lb)    No specific cause found at hospital. EGD done and showed possible candida gastrities. Ct scanned showed pancreatic lesions and recommended follow  up in 6 months. Patient states she ats ok but I suspect that she is feeling full early and does not realize how much she isnt eating. She also relays that she didn't realize she was loosing weight until her hospital stay.     Cognitive impairment.   Resides in assisted living with medication management.     Electronically signed by:  Manda MONROE CNP

## 2019-12-11 ENCOUNTER — RECORDS - HEALTHEAST (OUTPATIENT)
Dept: LAB | Facility: CLINIC | Age: 79
End: 2019-12-11

## 2019-12-11 ENCOUNTER — NURSING HOME VISIT (OUTPATIENT)
Dept: GERIATRICS | Facility: CLINIC | Age: 79
End: 2019-12-11
Payer: MEDICARE

## 2019-12-11 ENCOUNTER — TELEPHONE (OUTPATIENT)
Dept: GERIATRICS | Facility: CLINIC | Age: 79
End: 2019-12-11

## 2019-12-11 VITALS
OXYGEN SATURATION: 93 % | RESPIRATION RATE: 18 BRPM | TEMPERATURE: 98.2 F | BODY MASS INDEX: 25.23 KG/M2 | SYSTOLIC BLOOD PRESSURE: 150 MMHG | DIASTOLIC BLOOD PRESSURE: 82 MMHG | WEIGHT: 151.6 LBS | HEART RATE: 79 BPM

## 2019-12-11 DIAGNOSIS — N18.30 TYPE 2 DIABETES MELLITUS WITH STAGE 3 CHRONIC KIDNEY DISEASE, WITH LONG-TERM CURRENT USE OF INSULIN (H): ICD-10-CM

## 2019-12-11 DIAGNOSIS — G89.29 CHRONIC MIDLINE LOW BACK PAIN WITHOUT SCIATICA: ICD-10-CM

## 2019-12-11 DIAGNOSIS — M54.50 CHRONIC MIDLINE LOW BACK PAIN WITHOUT SCIATICA: ICD-10-CM

## 2019-12-11 DIAGNOSIS — B37.81 CANDIDIASIS OF ESOPHAGUS (H): Primary | ICD-10-CM

## 2019-12-11 DIAGNOSIS — I25.10 CORONARY ARTERY DISEASE INVOLVING NATIVE CORONARY ARTERY OF NATIVE HEART WITHOUT ANGINA PECTORIS: ICD-10-CM

## 2019-12-11 DIAGNOSIS — I50.32 CHRONIC DIASTOLIC CONGESTIVE HEART FAILURE (H): ICD-10-CM

## 2019-12-11 DIAGNOSIS — Z79.4 TYPE 2 DIABETES MELLITUS WITH STAGE 3 CHRONIC KIDNEY DISEASE, WITH LONG-TERM CURRENT USE OF INSULIN (H): ICD-10-CM

## 2019-12-11 DIAGNOSIS — E11.22 TYPE 2 DIABETES MELLITUS WITH STAGE 3 CHRONIC KIDNEY DISEASE, WITH LONG-TERM CURRENT USE OF INSULIN (H): ICD-10-CM

## 2019-12-11 PROCEDURE — 99305 1ST NF CARE MODERATE MDM 35: CPT | Performed by: FAMILY MEDICINE

## 2019-12-11 RX ORDER — CALCIUM CARBONATE 500 MG/1
1 TABLET, CHEWABLE ORAL 4 TIMES DAILY PRN
COMMUNITY
End: 2019-12-30

## 2019-12-11 RX ORDER — FLUCONAZOLE 50 MG/1
50 TABLET ORAL DAILY
Start: 2019-12-11

## 2019-12-11 RX ORDER — TIZANIDINE 2 MG/1
2 TABLET ORAL EVERY 6 HOURS PRN
COMMUNITY

## 2019-12-11 RX ORDER — FLUCONAZOLE 100 MG/1
100 TABLET ORAL DAILY
COMMUNITY
Start: 2019-12-10 | End: 2019-12-30

## 2019-12-11 NOTE — TELEPHONE ENCOUNTER
Patient continues to complain of heart burn. Results from hospital reviewed:    ESOPHAGEAL BRUSHIN. Pseudohyphae and yeast present, consistent with Candida  2. Marked acute inflammation, necrosis, and atypical squamous cells (see comment)  3. Parakeratosis present    (Comment) Squamous atypia in this setting may be reactive, however, mild dysplasia cannot be completely ruled out. No overt features of malignancy are identified in this sample.    Will start patient on fluconazole 50 mg po every day x 21 days.   Check INR tomorrow.

## 2019-12-13 LAB
ANION GAP SERPL CALCULATED.3IONS-SCNC: 14 MMOL/L (ref 5–18)
BNP SERPL-MCNC: 1148 PG/ML (ref 0–144)
BUN SERPL-MCNC: 25 MG/DL (ref 8–28)
CALCIUM SERPL-MCNC: 8.7 MG/DL (ref 8.5–10.5)
CHLORIDE BLD-SCNC: 103 MMOL/L (ref 98–107)
CO2 SERPL-SCNC: 24 MMOL/L (ref 22–31)
CREAT SERPL-MCNC: 1.56 MG/DL (ref 0.6–1.1)
GFR SERPL CREATININE-BSD FRML MDRD: 32 ML/MIN/1.73M2
GLUCOSE BLD-MCNC: 77 MG/DL (ref 70–125)
POTASSIUM BLD-SCNC: 3.8 MMOL/L (ref 3.5–5)
SODIUM SERPL-SCNC: 141 MMOL/L (ref 136–145)

## 2019-12-14 ENCOUNTER — TELEPHONE (OUTPATIENT)
Dept: GERIATRICS | Facility: CLINIC | Age: 79
End: 2019-12-14

## 2019-12-14 NOTE — TELEPHONE ENCOUNTER
BS = 59   Then 119 20 minutes later    At lunch she 311 so giving 18 units per scheduled and sliding scale.    Calling to update as they felt they needed to per protocol?    Electronically signed by Marycruz Sepulveda RN, CNP

## 2019-12-15 ENCOUNTER — TELEPHONE (OUTPATIENT)
Dept: GERIATRICS | Facility: CLINIC | Age: 79
End: 2019-12-15

## 2019-12-15 NOTE — TELEPHONE ENCOUNTER
Patient with a fib on Coumadin. INR today 3.2    PLAN  Hold Coumadin today. INR on 12/16    Electronically signed by MABEL Restrepo, GNP

## 2019-12-16 ENCOUNTER — DISCHARGE SUMMARY NURSING HOME (OUTPATIENT)
Dept: GERIATRICS | Facility: CLINIC | Age: 79
End: 2019-12-16
Payer: MEDICARE

## 2019-12-16 VITALS
OXYGEN SATURATION: 92 % | BODY MASS INDEX: 25.36 KG/M2 | SYSTOLIC BLOOD PRESSURE: 115 MMHG | TEMPERATURE: 97.3 F | RESPIRATION RATE: 20 BRPM | WEIGHT: 152.4 LBS | DIASTOLIC BLOOD PRESSURE: 53 MMHG | HEART RATE: 69 BPM

## 2019-12-16 DIAGNOSIS — N18.30 TYPE 2 DIABETES MELLITUS WITH STAGE 3 CHRONIC KIDNEY DISEASE, WITH LONG-TERM CURRENT USE OF INSULIN (H): ICD-10-CM

## 2019-12-16 DIAGNOSIS — M54.50 CHRONIC MIDLINE LOW BACK PAIN WITHOUT SCIATICA: ICD-10-CM

## 2019-12-16 DIAGNOSIS — E11.22 TYPE 2 DIABETES MELLITUS WITH STAGE 3 CHRONIC KIDNEY DISEASE, WITH LONG-TERM CURRENT USE OF INSULIN (H): ICD-10-CM

## 2019-12-16 DIAGNOSIS — G89.29 CHRONIC MIDLINE LOW BACK PAIN WITHOUT SCIATICA: ICD-10-CM

## 2019-12-16 DIAGNOSIS — I10 BENIGN ESSENTIAL HYPERTENSION: ICD-10-CM

## 2019-12-16 DIAGNOSIS — Z95.0 PRESENCE OF CARDIAC PACEMAKER: ICD-10-CM

## 2019-12-16 DIAGNOSIS — I25.10 ASCVD (ARTERIOSCLEROTIC CARDIOVASCULAR DISEASE): ICD-10-CM

## 2019-12-16 DIAGNOSIS — I48.20 CHRONIC ATRIAL FIBRILLATION (H): ICD-10-CM

## 2019-12-16 DIAGNOSIS — N18.30 CKD (CHRONIC KIDNEY DISEASE) STAGE 3, GFR 30-59 ML/MIN (H): ICD-10-CM

## 2019-12-16 DIAGNOSIS — I50.32 CHRONIC DIASTOLIC CONGESTIVE HEART FAILURE (H): Primary | ICD-10-CM

## 2019-12-16 DIAGNOSIS — R63.4 WEIGHT LOSS: ICD-10-CM

## 2019-12-16 DIAGNOSIS — Z79.4 TYPE 2 DIABETES MELLITUS WITH STAGE 3 CHRONIC KIDNEY DISEASE, WITH LONG-TERM CURRENT USE OF INSULIN (H): ICD-10-CM

## 2019-12-16 DIAGNOSIS — D64.9 CHRONIC ANEMIA: ICD-10-CM

## 2019-12-16 PROBLEM — A49.8 CLOSTRIDIUM DIFFICILE INFECTION: Status: RESOLVED | Noted: 2019-05-08 | Resolved: 2019-12-16

## 2019-12-16 PROBLEM — R41.89 COGNITIVE IMPAIRMENT: Status: RESOLVED | Noted: 2017-12-08 | Resolved: 2019-12-16

## 2019-12-16 PROBLEM — I50.33 ACUTE ON CHRONIC DIASTOLIC (CONGESTIVE) HEART FAILURE (H): Status: RESOLVED | Noted: 2019-11-22 | Resolved: 2019-12-16

## 2019-12-16 PROCEDURE — 99316 NF DSCHRG MGMT 30 MIN+: CPT | Performed by: NURSE PRACTITIONER

## 2019-12-16 RX ORDER — NYSTATIN 100000/ML
500000 SUSPENSION, ORAL (FINAL DOSE FORM) ORAL 4 TIMES DAILY
COMMUNITY
Start: 2019-12-10

## 2019-12-16 ASSESSMENT — ENCOUNTER SYMPTOMS
BACK PAIN: 1
HEARTBURN: 0
EYE PAIN: 0
DIZZINESS: 0
SORE THROAT: 0
HEADACHES: 0
FEVER: 0
APPETITE CHANGE: 0
ABDOMINAL PAIN: 0
SHORTNESS OF BREATH: 0
FREQUENCY: 0
NAUSEA: 0
CONSTIPATION: 0
ARTHRALGIAS: 0
COUGH: 0

## 2019-12-16 NOTE — PROGRESS NOTES
HISTORY OF PRESENT ILLNESS:  Diya is a 79 year old female, (1940), admitted to [facility] from [facility]. Hospital stay 11/21/19 through 11/30/19. Admitted to this facility for rehab, medical management and nursing care.     HPI information obtained from: patient report, GNP report and Care Everywhere Epic chart review.     Brief Summary of Hospital Course: Patient was admitted to [facility] from 11/13 to 11/18 with diarrhea, syncope and anemia.  She was discharged back to her assisted living apartment.  And went back to [facility] on 11/21 for nausea and CHF exacerbation.  She underwent an EGD on 11/29 this indicated possible Candida esophagitis.  But was not confirmed prior to discharge.  She was started on Protonix daily.  GI is delaying treatment of Candida esophagitis until confirmed and they will update patient.    MEDICATION CHANGES: Butrans was stopped due to nausea and weight loss.  Started on Protonix and omeprazole stopped.  Torsemide stopped and Bumex started.  Started on Imdur due to complaints of chest pain prior to hospital admission.     RECOMMENDED FOLLOW UP:Pancreatic lesions were noted and a six-month follow-up was recommended.     Updates on Status Since Skilled nursing Admission:   12/2 patient is on 3 L of oxygen nasal cannula.  Complains of back pain. Warfarin adjusted based on INR.   12/9 patient had a fall last week without injury. complains of muscle spasm.     Past Medical History/  Patient Active Problem List    Diagnosis Date Noted     Dementia (H) 12/02/2019     Priority: Medium     Weight loss 11/22/2019     Priority: Medium     Coronary artery disease involving native coronary artery of native heart without angina pectoris 11/13/2019     Priority: Medium     Chronic anemia 11/13/2019     Priority: Medium     Inflammatory polyarthropathy (H) 10/21/2019     Priority: Medium     Hx of encephalopathy 05/08/2019     Priority: Medium     Advanced  directives, counseling/discussion 12/14/2017     Priority: Medium     Falls frequently 12/14/2017     Priority: Medium     Hx of seizure disorder 12/08/2017     Priority: Medium     Elevated LFTs 07/31/2017     Priority: Medium     Physical deconditioning 07/31/2017     Priority: Medium     Chronic atrial fibrillation 07/31/2017     Priority: Medium     Benign essential hypertension 07/31/2017     Priority: Medium     Chronic diastolic congestive heart failure (H) 07/31/2017     Priority: Medium     CKD (chronic kidney disease) stage 3, GFR 30-59 ml/min (H) 07/31/2017     Priority: Medium     Presence of cardiac pacemaker 01/31/2017     Priority: Medium     Date of last device in office evaluation: 8/9/2019     ?  and model: Medtronic Advisa  Pacemaker  Date of implant: 1/4/17      ? Indication for device: Sick sinus syndrome, paroxysmal atrial fibrillation    ? Cardiac resynchronization therapy:  no    ? Battery longevity documented as less than 3  Months: no  ? Are any of the leads less than 3 months old: no    ? Programming              ? Pacing mode and programmed lower rate: AAIR-DDDR 60 bpm              ? Rate-responsive sensor type, if programmed on: accelerometer        Underlying rhythm and heart rate if it can be determined:  No intrinsic P or R waves at VVI 40 bpm      ? What is the response of this device to magnet placement: asynchronous  ? PM magnet pacing rate: 85 bpm   ? Any alert status on CIED generator or lead: no    ? Last pacing threshold    Atrial   1.0 V @ 0.40 ms           Ventricular RV: 0.5 V @ 0.40 ms       Sick sinus syndrome (H) 01/13/2017     Priority: Medium     Nondiabetic proliferative retinopathy 08/08/2016     Priority: Medium     Chronic back pain 03/29/2016     Priority: Medium     Multiple-type hyperlipidemia 01/26/2016     Priority: Medium     Mass of breast 07/23/2015     Priority: Medium     Fatigue 07/22/2015     Priority: Medium     Lung nodule < 6cm on CT  07/10/2015     Priority: Medium     Overview:   Lung nodules. Following with Dr. Pennington, due for repeat CT 1/2016       Stenosis of carotid artery 06/12/2015     Priority: Medium     Candidal intertrigo 04/10/2015     Priority: Medium     Microalbuminuria 01/26/2015     Priority: Medium     Mild episode of recurrent major depressive disorder (H) 01/26/2015     Priority: Medium     Type 2 diabetes mellitus (H) 01/26/2015     Priority: Medium     Spinal stenosis of lumbar region 01/23/2015     Priority: Medium     History of adenomatous polyp of colon 11/22/2013     Priority: Medium     History of endovascular stent graft for abdominal aortic aneurysm 11/22/2013     Priority: Medium     Anxiety 10/22/2013     Priority: Medium     Seasonal allergic rhinitis 09/03/2013     Priority: Medium     Seizure disorder (H) 09/03/2013     Priority: Medium     Overview:   Possible reaction to Remicade, takes Keppra       Glaucoma 08/19/2013     Priority: Medium     Hypoferremia 08/19/2013     Priority: Medium     Overview:   Source in distal small bowel per patient (identified on pill cam). History transfusions.       Obstructive sleep apnea syndrome 08/19/2013     Priority: Medium     Overview:   Unable to tolerate CPAP       Peripheral arterial occlusive disease (H) 08/19/2013     Priority: Medium     Overview:   8/8/01 - 18-mm infrarenal aortic stent with angioplasty   8/23/01, Angioplasty Rt prox SFA, thrombolysis Rt CFA and SFA.  7/14/11 - bilateral GLENN stents       Rheumatoid arthritis (H) 08/19/2013     Priority: Medium     Overview:   Dr. Trevino         Past Surgical History:   Procedure Laterality Date     ABDOMEN SURGERY      AAA Repair, abdominal aorta and illiac stents placed     BREAST SURGERY      Breast Biopsy     CARDIAC SURGERY      Aortic Stent Graft     CARDIAC SURGERY      Cardiac Catheterization     CATARACT IOL, RT/LT  2010    Right     GYN SURGERY      Hysterectomy     HEAD & NECK SURGERY      Tonsillectomy      HYSTERECTOMY      age 41     LASER YAG CAPSULOTOMY Bilateral 8/27/2015    Procedure: LASER YAG CAPSULOTOMY;  Surgeon: Gus Gregorio MD;  Location: [facility] OR     PHACOEMULSIFICATION WITH STANDARD INTRAOCULAR LENS IMPLANT  6/26/2014    Procedure: PHACOEMULSIFICATION WITH STANDARD INTRAOCULAR LENS IMPLANT;  Surgeon: Gus Gregorio MD;  Location: [facility] OR     TONSILLECTOMY      age 22     VASCULAR SURGERY      Angio extremity bilateral       History reviewed. No pertinent family history.        Social History     Socioeconomic History     Marital status:      Spouse name: Not on file     Number of children: Not on file     Years of education: Not on file     Highest education level: Not on file   Occupational History     Not on file   Social Needs     Financial resource strain: Not on file     Food insecurity:     Worry: Not on file     Inability: Not on file     Transportation needs:     Medical: Not on file     Non-medical: Not on file   Tobacco Use     Smoking status: Former Smoker     Packs/day: 1.00     Years: 30.00     Pack years: 30.00     Types: Cigarettes     Smokeless tobacco: Never Used   Substance and Sexual Activity     Alcohol use: No     Drug use: No     Sexual activity: Never   Lifestyle     Physical activity:     Days per week: Not on file     Minutes per session: Not on file     Stress: Not on file   Relationships     Social connections:     Talks on phone: Not on file     Gets together: Not on file     Attends [employer]odoxy service: Not on file     Active member of club or organization: Not on file     Attends meetings of clubs or organizations: Not on file     Relationship status: Not on file     Intimate partner violence:     Fear of current or ex partner: Not on file     Emotionally abused: Not on file     Physically abused: Not on file     Forced sexual activity: Not on file   Other Topics Concern     Parent/sibling w/ CABG, MI or angioplasty before 65F 55M? Not Asked   Social  History Narrative     Not on file        Current Outpatient Medications   Medication Sig     acetaminophen (TYLENOL) 325 MG tablet Take 650 mg by mouth 3 times daily      acetaminophen (TYLENOL) 325 MG tablet Take 650 mg by mouth every 12 hours as needed for pain      amiodarone (PACERONE/CODARONE) 200 MG tablet Take 1 tablet (200 mg) by mouth daily     AMLODIPINE BESYLATE PO Take 5 mg by mouth daily      aspirin 81 MG EC tablet Take 81 mg by mouth daily     bumetanide (BUMEX) 1 MG tablet Take 1 mg by mouth 2 times daily     calcium carbonate (TUMS) 500 MG chewable tablet Take 1 chew tab by mouth 4 times daily as needed for heartburn     CRANBERRY-VIT C-LACTOBACILLUS PO Take 1 tablet by mouth daily     diclofenac (VOLTAREN) 1 % topical gel Place 2 g onto the skin 4 times daily as needed (also HS scheduled) And also 2 gram topically at bedtime     DULoxetine (CYMBALTA) 60 MG EC capsule Take 60 mg by mouth 2 times daily     ergocalciferol (ERGOCALCIFEROL) 48573 units capsule Take 50,000 Units by mouth once a week     ferrous sulfate (IRON) 325 (65 Fe) MG tablet Take 325 mg by mouth daily      fluconazole (DIFLUCAN) 100 MG tablet Take 100 mg by mouth daily     fluticasone (FLOVENT DISKUS) 50 MCG/BLIST AEPB Inhale 2 puffs into the lungs daily      FOLIC ACID PO Take 1 mg by mouth daily     GABAPENTIN PO Take 300 mg by mouth 3 times daily      insulin aspart (NOVOLOG FLEXPEN) 100 UNIT/ML injection Inject 10 Units Subcutaneous 3 times daily (with meals) Also follow ssi: 0 - 69 = 0 SEE HYPOGLYCEMIA PROTOCOL; 70 - 149 = 0 NO INSULIN, GIVE PRANDIAL INSULIN IF ORDERED ; 150 - 199 = 2; 200 - 249 = 4; 250 - 299 = 6; 300 - 349 = 8; 350 - 399 = 10; 400+ = 12 GIVE 12 UNITS AND CALL MD, subcutaneously three times a day     insulin aspart (NOVOLOG FLEXPEN) 100 UNIT/ML pen Inject 10 Units Subcutaneous 3 times daily (with meals)     insulin glargine (LANTUS) 100 UNIT/ML injection Inject 12 Units Subcutaneous At Bedtime      isosorbide  mononitrate (IMDUR) 30 MG 24 hr tablet Take 30 mg by mouth daily     Lactobacillus (PROBIOTIC ACIDOPHILUS PO) Take 1 tablet by mouth daily      latanoprost (XALATAN) 0.005 % ophthalmic solution Place 1 drop into both eyes At Bedtime     leflunomide (ARAVA) 20 MG tablet Take 1 tablet (20 mg) by mouth daily . Labs needed every 8-12 weeks.     levETIRAcetam (KEPPRA) 500 MG tablet Take 500 mg by mouth 2 times daily     Lidocaine (LIDOCARE) 4 % Patch Place 1 patch onto the skin daily as needed for moderate pain To prevent lidocaine toxicity, patient should be patch free for 12 hrs daily.     loperamide (IMODIUM A-D) 2 MG tablet Take 2 mg by mouth every 2 hours as needed for diarrhea     meclizine (ANTIVERT) 25 MG tablet Take 25 mg by mouth 3 times daily as needed      MELATONIN PO Take 3 mg by mouth At Bedtime      menthol (ICY HOT) 5 % PTCH Apply 1 patch topically every 8 hours as needed for muscle soreness     METOPROLOL TARTRATE PO Take 50 mg by mouth 2 times daily      mupirocin (BACTROBAN) 2 % ointment Apply topically 3 times daily as needed      nitroglycerin (NITROSTAT) 0.4 MG sublingual tablet Place 0.4 mg under the tongue every 5 minutes as needed for chest pain For chest pain place 1 tablet under the tongue every 5 minutes for 3 doses. If symptoms persist 5 minutes after 1st dose call 911.     nystatin (MYCOSTATIN) 483933 UNIT/GM external powder Apply topically 2 times daily as needed      ondansetron (ZOFRAN-ODT) 4 MG ODT tab Take 4 mg by mouth every 8 hours as needed      pantoprazole (PROTONIX) 40 MG EC tablet Take 40 mg by mouth daily     polyethylene glycol (MIRALAX/GLYCOLAX) packet Take 17 g by mouth daily as needed for constipation     POTASSIUM CHLORIDE PO Take 40 mEq by mouth 2 times daily      PRAVASTATIN SODIUM PO Take 40 mg by mouth every evening     senna-docusate (SENOKOT-S;PERICOLACE) 8.6-50 MG per tablet Take 1 tablet by mouth 2 times daily as needed      timolol (TIMOPTIC) 0.5 % ophthalmic  solution Place 1 drop into both eyes 2 times daily Hold if HR <60     TRAMADOL HCL PO Take 50 mg by mouth every 6 hours as needed for moderate to severe pain      triamcinolone (KENALOG) 0.1 % cream Apply topically 3 times daily as needed      Warfarin Sodium (COUMADIN PO) Take 2.5 mg by mouth daily Take every Tues/Wed/Thur/Fri/Sat/Sun - Recheck INR 12/15/2019     fluconazole (DIFLUCAN) 50 MG tablet Take 1 tablet (50 mg) by mouth daily     tiZANidine (ZANAFLEX) 2 MG tablet Take 2 mg by mouth every 6 hours as needed     No current facility-administered medications for this visit.        Allergies   Allergen Reactions     Rosiglitazone Hives     Ambien      Sleepwalking     Ambien [Zolpidem] Other (See Comments) and Unknown     Sleepwalking     Sulfa Drugs Unknown     Lightheaded, GI upset  Lightheaded, GI upset  Lightheaded, GI upset     Azithromycin Diarrhea     Clindamycin Diarrhea     Codeine Nausea and Vomiting     Other reaction(s): Vomiting  Other reaction(s): Vomiting     Lisinopril Cough     Nitrofurantoin Diarrhea     Simvastatin Diarrhea     Triamterene-Hctz [Hydrochlorothiazide W/Triamterene] Swelling and Nausea     Other reaction(s): Myalgia       Information reviewed:  Medications, vital signs, orders, and nursing notes.    Review of Systems   Constitutional: Negative for appetite change and fever.   HENT: Negative for congestion, ear pain and sore throat.    Eyes: Negative for pain.   Respiratory: Negative for cough and shortness of breath.    Cardiovascular: Negative for chest pain and peripheral edema.   Gastrointestinal: Negative for abdominal pain, constipation, heartburn and nausea.   Genitourinary: Negative for frequency.   Musculoskeletal: Positive for back pain (Chronic). Negative for arthralgias.   Neurological: Negative for dizziness and headaches.   Psychiatric/Behavioral: Negative for mood changes.        OBJECTIVE:  BP (!) 150/82   Pulse 79   Temp 98.2  F (36.8  C)   Resp 18   Wt 68.8  kg (151 lb 9.6 oz)   SpO2 93%   BMI 25.23 kg/m    Physical Exam  Constitutional:       General: She is not in acute distress.     Appearance: She is well-developed.   HENT:      Right Ear: External ear normal.      Left Ear: External ear normal.      Nose: Nose normal.   Eyes:      General:         Right eye: No discharge.         Left eye: No discharge.      Conjunctiva/sclera: Conjunctivae normal.      Pupils: Pupils are equal, round, and reactive to light.   Neck:      Musculoskeletal: Neck supple.      Thyroid: No thyromegaly.      Trachea: No tracheal deviation.   Cardiovascular:      Rate and Rhythm: Normal rate and regular rhythm.      Heart sounds: Normal heart sounds, S1 normal and S2 normal. No murmur.   Pulmonary:      Effort: Pulmonary effort is normal. No respiratory distress.      Breath sounds: Normal breath sounds. No wheezing or rales.   Abdominal:      General: Bowel sounds are normal.      Palpations: Abdomen is soft. There is no mass.      Tenderness: There is no abdominal tenderness.   Musculoskeletal: Normal range of motion.   Lymphadenopathy:      Cervical: No cervical adenopathy.   Skin:     General: Skin is warm and dry.      Findings: No rash.   Neurological:      Mental Status: She is alert.      Motor: No abnormal muscle tone.      Deep Tendon Reflexes: Reflexes are normal and symmetric.   Psychiatric:         Behavior: Behavior is cooperative.          ASSESSMENT/PLAN:    Chronic diastolic congestive heart failure (H)  Appears euvolemic.  Currently on Bumex.  Oxygen has slowly been titrating down.  She was on chronic oxygen prior to hospitalization.    Coronary artery disease involving native coronary artery of native heart without angina pectoris  She denies chest pain since Imdur was started.  She follows up with cardiology later today.    Chronic back pain  Tizanidine was started for back pain and she feels that that has been helpful.    Type 2 diabetes mellitus (H)  Patient  presented with weakness and weight loss.  She has an A1c of 5.5%.  Her Lantus was decreased.  Her blood sugars are ranging from 70-2 70s.  We will continue to monitor.          Aixa Esparza MD

## 2019-12-16 NOTE — ASSESSMENT & PLAN NOTE
Appears euvolemic.  Currently on Bumex.  Oxygen has slowly been titrating down.  She was on chronic oxygen prior to hospitalization.

## 2019-12-16 NOTE — PROGRESS NOTES
[place] [facility] DISCHARGE SUMMARY    PATIENT'S NAME: Diya Cruz  YOB: 1940  MEDICAL RECORD NUMBER:  [id number]  Place of Service where encounter took place:  [facility] [[id number]]    PRIMARY CARE PROVIDER AND CLINIC RESPONSIBLE AFTER TRANSFER: [facility] [address] [facility] / [facility] [address]    Assisted Living: [facility] ([facility])     Transferring providers: Manda Molina NP / Aixa Esparza MD  Recent Hospitalization/ED:  [facility]. Hospital stay 11/21/19 through 11/30/19..    Date of SNF Admission: 11/30  Date of SNF (anticipated) Discharge: 12/19  Discharged to: previous assisted living  Cognitive Scores:  ACL 4.2/5.8  Physical Function: ambulating 200 feet with 2 wheeled walker and assist of 1. Assist of 1 for transfers , dressing, toileting.   DME: no new.     CODE STATUS/ADVANCE DIRECTIVES DISCUSSION:  Full Code  ALLERGIES: Rosiglitazone; Ambien; Ambien [zolpidem]; Sulfa drugs; Azithromycin; Clindamycin; Codeine; Lisinopril; Nitrofurantoin; Simvastatin; and Triamterene-hctz [hydrochlorothiazide w/triamterene]    DISCHARGE DIAGNOSIS/NURSING FACILITY COURSE:   Brief Summary of Hospital Course: Patient was admitted to [facility] from 11/13 to 11/18 with diarrhea, syncope and anemia.  She was discharged back to her assisted living apartment.  And went back to [facility] on 11/21 for nausea and CHF exacerbation.  She underwent an EGD on 11/29 this indicated possible Candida esophagitis.  But was not confirmed prior to discharge.  She was started on Protonix daily.  GI is delaying treatment of Candida esophagitis until confirmed and they will update patient.  MEDICATION CHANGES: Butrans was stopped due to nausea and weight loss.  started on Protonix and omeprazole stopped.  Torsemide stopped and Bumex started.  Started on Imdur due to complaints of chest pain prior to hospital admission.      Updates on Status Since Skilled nursing Admission: 12/2 patient is on 3 L of oxygen nasal cannula.  Complains of back pain    12/9 patient had a fall last week without injury. complains of muscle spasm. started tizanidine 2 mg every 8 hours PRN for muscle spasm.   12/11 MD visit. Started fluconazole for treatment of esophageal candida.   12/15 INR 3.2 order given to hold warfarin.  12/16 INR 4.1 and patient got warfarin yesterday. Held warfarin x 2 days and recheck on Wednesday. Discontinue sliding scale insulin. lantus decreased and meal time novlog decreased.        Type 2 diabetes mellitus with stage 3 chronic kidney disease, with long-term current use of insulin (H)  hgb a1c on 10/30/2019 was 5.5 lantus was decreased to 12 units then 9 units. while at [facility] due to fasting blood sugars in the 50's. Also decreased suppertime novlog to 6 units and continued the breakfast and lunch novolog at 10 units.       Chronic diastolic congestive heart failure (H)  Stable.  Torsemide discontinued and Bumex started at the hospital.  Was requiring 5 to 7 L of oxygen at the hospital. Back to home prescription is for 2 L nasal cannula continuous.    ASCVD (arteriosclerotic cardiovascular disease)  Initially taken to the hospital for chest pain with exertion.  She has known occluded right coronary.  Imdur was added at the hospital and she saw cardiology on 12/11.     CKD (chronic kidney disease) stage 3, GFR 30-59 ml/min (H)  Kidney function was stable during her hospital stay. She was discharged on potassium 40 mEq 2 times a day.    No changes.     Benign essential hypertension  Controlled.  Continue amlodipine, Bumex, Imdur, metoprolol.     Chronic atrial fibrillation  Presence of cardiac pacemaker  Rate controlled.  Has pacemaker.  On warfarin for clot prevention.     INR elevated after starting fluconazole. Will need closer monitoring of INR for next 4 weeks.   home dosing of 2.5 mg Monday Tuesday Thursday Sunday and 1.25 mg  Wednesday Friday Sunday.     Chronic midline low back pain without sciatica  Home regimen was butrans with PRN tramadol and scheduled tylenol. butrans was discontinued at the hospital as it was felt to be contributing to nausea and weight loss. Patient states that her pain hasn't changed since stopping it. She likes to lie down to help with her back pain. Tizanidine as needed was added to her regimen. She is using about once per day.      Chronic anemia  guaic stool was negative at hospital. Iron studies were normal. Continue to monitor.      Weight loss   No specific cause found at hospital. EGD done and showed possible candida gastrities. Ct scanned showed pancreatic lesions and recommended follow up in 6 months. Patient states she ats ok but I suspect that she is feeling full early and does not realize how much she isnt eating. She also relays that she didn't realize she was loosing weight until her hospital stay.      Cognitive impairment.   Resides in assisted living with medication management.     Esophageal candida  She was started on 21 days of fluconazole for candida of esophagus.     DISCHARGE MEDICATIONS:  Current Outpatient Medications   Medication     acetaminophen (TYLENOL) 325 MG tablet     acetaminophen (TYLENOL) 325 MG tablet     amiodarone (PACERONE/CODARONE) 200 MG tablet     AMLODIPINE BESYLATE PO     aspirin 81 MG EC tablet     bumetanide (BUMEX) 1 MG tablet     CRANBERRY-VIT C-LACTOBACILLUS PO     diclofenac (VOLTAREN) 1 % topical gel     DULoxetine (CYMBALTA) 60 MG EC capsule     ergocalciferol (ERGOCALCIFEROL) 84847 units capsule     ferrous sulfate (IRON) 325 (65 Fe) MG tablet     fluticasone (FLOVENT DISKUS) 50 MCG/BLIST AEPB     FOLIC ACID PO     GABAPENTIN PO     insulin aspart (NOVOLOG FLEXPEN) 100 UNIT/ML injection     insulin aspart (NOVOLOG FLEXPEN) 100 UNIT/ML pen     insulin aspart (NOVOLOG FLEXPEN) 100 UNIT/ML pen     insulin glargine (LANTUS) 100 UNIT/ML injection     isosorbide  mononitrate (IMDUR) 30 MG 24 hr tablet     Lactobacillus (PROBIOTIC ACIDOPHILUS PO)     latanoprost (XALATAN) 0.005 % ophthalmic solution     leflunomide (ARAVA) 20 MG tablet     levETIRAcetam (KEPPRA) 500 MG tablet     Lidocaine (LIDOCARE) 4 % Patch     loperamide (IMODIUM A-D) 2 MG tablet     meclizine (ANTIVERT) 25 MG tablet     MELATONIN PO     menthol (ICY HOT) 5 % PTCH     METOPROLOL TARTRATE PO     mupirocin (BACTROBAN) 2 % ointment     nitroglycerin (NITROSTAT) 0.4 MG sublingual tablet     nystatin (MYCOSTATIN) 402779 UNIT/GM external powder     nystatin (MYCOSTATIN) 080210 UNIT/ML suspension     ondansetron (ZOFRAN-ODT) 4 MG ODT tab     pantoprazole (PROTONIX) 40 MG EC tablet     polyethylene glycol (MIRALAX/GLYCOLAX) packet     POTASSIUM CHLORIDE PO     PRAVASTATIN SODIUM PO     senna-docusate (SENOKOT-S;PERICOLACE) 8.6-50 MG per tablet     timolol (TIMOPTIC) 0.5 % ophthalmic solution     tiZANidine (ZANAFLEX) 2 MG tablet     TRAMADOL HCL PO     triamcinolone (KENALOG) 0.1 % cream     Warfarin Sodium (COUMADIN PO)     calcium carbonate (TUMS) 500 MG chewable tablet     fluconazole (DIFLUCAN) 100 MG tablet     fluconazole (DIFLUCAN) 50 MG tablet     No current facility-administered medications for this visit.        ROS:    4 point ROS including Respiratory, CV, GI and , other than that noted in the HPI,  is negative    Physical Exam:   Vitals: /53   Pulse 69   Temp 97.3  F (36.3  C)   Resp 20   Wt 69.1 kg (152 lb 6.4 oz)   SpO2 92%   BMI 25.36 kg/m    BMI= Body mass index is 25.36 kg/m .  General: alert, in no distress on 2L nasal cannula. Up ambulating with staff.     DISCHARGE PLAN:  Occupational Therapy, Physical Therapy and Registered Nurse  Patient instructed to follow-up with:  PCP in 7 days      [facility] scheduled appointments:  Future Appointments   Date Time Provider Department Center   4/20/2020  9:20 AM Jim Hunter MD [facility]       MTM referral needed and  placed by this provider: No    Pending labs: none  SNF labs: done and faxed to cardiology.   Discharge Treatments  - Ok to discharge to home with current medications and treatments  - Home Physical Therapy / Ocupational Therapy / RN / HHA  - Follow up with Primary care provider in 1 week  - Scripts written: 30 tabs of tramadol.     TOTAL DISCHARGE TIME:   Greater than 30 minutes  Electronically signed by:  Manda Molina NP      Orders written by provider at facility and transcribed by : Virginia Argueta MA   1.  INR 4.1 hold Warfarin x 2 days, then check INR on Wednesday.  2.  Change Novolog to 10 units subcutaneous with breakfast and lunch, 6 units subcutaneous with supper.  Dx: DM2

## 2019-12-16 NOTE — ASSESSMENT & PLAN NOTE
Patient presented with weakness and weight loss.  She has an A1c of 5.5%.  Her Lantus was decreased.  Her blood sugars are ranging from 70-2 70s.  We will continue to monitor.

## 2019-12-18 ENCOUNTER — NURSING HOME VISIT (OUTPATIENT)
Dept: GERIATRICS | Facility: CLINIC | Age: 79
End: 2019-12-18
Payer: MEDICARE

## 2019-12-18 VITALS
HEART RATE: 66 BPM | TEMPERATURE: 97.3 F | RESPIRATION RATE: 20 BRPM | WEIGHT: 149.6 LBS | BODY MASS INDEX: 24.89 KG/M2 | SYSTOLIC BLOOD PRESSURE: 125 MMHG | OXYGEN SATURATION: 92 % | DIASTOLIC BLOOD PRESSURE: 81 MMHG

## 2019-12-18 DIAGNOSIS — Z79.899 ENCOUNTER FOR LONG-TERM (CURRENT) USE OF MEDICATIONS: ICD-10-CM

## 2019-12-18 DIAGNOSIS — I48.20 CHRONIC ATRIAL FIBRILLATION (H): Primary | ICD-10-CM

## 2019-12-18 DIAGNOSIS — Z79.01 LONG TERM CURRENT USE OF ANTICOAGULANT THERAPY: ICD-10-CM

## 2019-12-18 PROCEDURE — 99308 SBSQ NF CARE LOW MDM 20: CPT | Performed by: NURSE PRACTITIONER

## 2019-12-18 NOTE — PROGRESS NOTES
[place] [facility]  [place] Medical Record Number:  [id number]  Place of Service where encounter took place: [facility] [[id number]]    HPI:    Diya Cruz is a 79 year old  (1940), who is being seen today for an episodic care visit at the above location.   HPI information obtained from: facility chart records, facility staff and patient report. Today's concern is INR/Coumadin management for DANIEL Fib    ROS/Subjective:  Bleeding Signs/Symptoms:  None  Thromboembolic Signs/Symptoms:  None  Medication Changes:  No  Dietary Changes:  No  Activity Changes: No  Bacterial/Viral Infection:  No  Missed Coumadin Doses:  None  Other Concerns:  Yes - recently had an elevated INR after started fluconazole.     OBJECTIVE:  /81   Pulse 66   Temp 97.3  F (36.3  C)   Resp 20   Wt 67.9 kg (149 lb 9.6 oz)   SpO2 92%   BMI 24.89 kg/m    Last INR: 4.1 on 12/16   INR Today:  3.6  Current Dose:  Held x 2 days. Before that was stable on 1.25 mg twice per week and 2.5 mg 4 x per week.    ASSESSMENT:     Chronic atrial fibrillation  Long term current use of anticoagulant therapy  Encounter for long-term (current) use of medications  Supratherapeutic INR for goal of 2-3    PLAN:  New Dose: 1.25 mg     Next INR: Friday     Electronically signed by:  Manda Molina NP     Orders written by provider at facility and transcribed by : Virginia Argueta MA   1.  INR 3.6 Warfarin 1.25 mg po every day.  2.  Recheck INR on Friday.

## 2019-12-20 ENCOUNTER — RECORDS - HEALTHEAST (OUTPATIENT)
Dept: LAB | Facility: CLINIC | Age: 79
End: 2019-12-20

## 2019-12-20 LAB — INR PPP: 1.78 (ref 0.9–1.1)

## 2019-12-28 ENCOUNTER — TELEPHONE (OUTPATIENT)
Dept: GERIATRICS | Facility: CLINIC | Age: 79
End: 2019-12-28

## 2019-12-28 NOTE — TELEPHONE ENCOUNTER
Day 2 of admission  INR = 2.2    Continue coumadin 2.5mg daily  INR on day 4    Electronically signed by Marycruz Sepulveda RN, CNP

## 2019-12-30 ENCOUNTER — NURSING HOME VISIT (OUTPATIENT)
Dept: GERIATRICS | Facility: CLINIC | Age: 79
End: 2019-12-30
Payer: MEDICARE

## 2019-12-30 VITALS
OXYGEN SATURATION: 92 % | BODY MASS INDEX: 25.23 KG/M2 | HEART RATE: 61 BPM | SYSTOLIC BLOOD PRESSURE: 157 MMHG | DIASTOLIC BLOOD PRESSURE: 73 MMHG | RESPIRATION RATE: 16 BRPM | WEIGHT: 151.6 LBS | TEMPERATURE: 97.1 F

## 2019-12-30 DIAGNOSIS — E78.2 MIXED HYPERLIPIDEMIA: ICD-10-CM

## 2019-12-30 DIAGNOSIS — Z79.4 TYPE 2 DIABETES MELLITUS WITH STAGE 3 CHRONIC KIDNEY DISEASE, WITH LONG-TERM CURRENT USE OF INSULIN (H): ICD-10-CM

## 2019-12-30 DIAGNOSIS — G47.33 OBSTRUCTIVE SLEEP APNEA SYNDROME: ICD-10-CM

## 2019-12-30 DIAGNOSIS — I73.9 PERIPHERAL ARTERIAL DISEASE (H): ICD-10-CM

## 2019-12-30 DIAGNOSIS — E11.22 TYPE 2 DIABETES MELLITUS WITH STAGE 3 CHRONIC KIDNEY DISEASE, WITH LONG-TERM CURRENT USE OF INSULIN (H): ICD-10-CM

## 2019-12-30 DIAGNOSIS — N18.30 TYPE 2 DIABETES MELLITUS WITH STAGE 3 CHRONIC KIDNEY DISEASE, WITH LONG-TERM CURRENT USE OF INSULIN (H): ICD-10-CM

## 2019-12-30 DIAGNOSIS — G40.909 SEIZURE DISORDER (H): ICD-10-CM

## 2019-12-30 DIAGNOSIS — I48.20 CHRONIC ATRIAL FIBRILLATION (H): ICD-10-CM

## 2019-12-30 DIAGNOSIS — R29.6 FALLS FREQUENTLY: ICD-10-CM

## 2019-12-30 DIAGNOSIS — I10 BENIGN ESSENTIAL HYPERTENSION: ICD-10-CM

## 2019-12-30 DIAGNOSIS — G93.40 ACUTE ENCEPHALOPATHY: Primary | ICD-10-CM

## 2019-12-30 DIAGNOSIS — I50.32 CHRONIC DIASTOLIC CONGESTIVE HEART FAILURE (H): ICD-10-CM

## 2019-12-30 DIAGNOSIS — N18.30 CKD (CHRONIC KIDNEY DISEASE) STAGE 3, GFR 30-59 ML/MIN (H): ICD-10-CM

## 2019-12-30 PROCEDURE — 99309 SBSQ NF CARE MODERATE MDM 30: CPT | Performed by: NURSE PRACTITIONER

## 2019-12-30 RX ORDER — CEFDINIR 300 MG/1
300 CAPSULE ORAL DAILY
COMMUNITY
Start: 2019-02-27 | End: 2020-01-13

## 2019-12-30 RX ORDER — BUPRENORPHINE 10 UG/H
1 PATCH TRANSDERMAL
COMMUNITY
End: 2019-12-30

## 2019-12-30 NOTE — PROGRESS NOTES
[place] [facility]  PRIMARY CARE PROVIDER AND CLINIC:  [facility] [address] [facility] / [facility] [address]  Chief Complaint   Patient presents with     Hospital F/U     [place] Medical Record Number:  [id number]  Place of Service where encounter took place:  [facility] [[id number]]    iDya Cruz  is a 79 year old  (1940), admitted to the above facility from  [facility] . Hospital stay 12/23/19 through 12/27/19..  Admitted to this facility for  rehab, medical management and nursing care.    HPI information obtained from: patient report and Care Everywhere Epic chart review.   Brief Summary of Hospital Course: admitted to the hospital with hypoglycemia and encephalopathy.   MEDICATION CHANGES: lantus decreased to 3 units daily. novlog changed to sliding scale.   RECOMMENDED FOLLOW UP: with PCP at discharge from TCU.  Updates on Status Since Skilled nursing Admission: 12/30 therapy reports 150 feet contact guard.     CODE STATUS/ADVANCE DIRECTIVES DISCUSSION:   DNR  Patient's living condition: lives in an assisted living facility    ALLERGIES: Rosiglitazone; Ambien; Ambien [zolpidem]; Sulfa drugs; Azithromycin; Clindamycin; Codeine; Lisinopril; Nitrofurantoin; Simvastatin; and Triamterene-hctz [hydrochlorothiazide w/triamterene]  PAST MEDICAL HISTORY:  has a past medical history of Abdominal aortic stenosis, Anxiety, Basal cell carcinoma, Carotid stenosis, Cerebrovascular accident (CVA) (H) (8/19/2013), Claudication (H), Closed supracondylar fracture of left humerus with routine healing, subsequent encounter (12/8/2017), Clostridium difficile infection (5/8/2019), Compression fracture, Depressive disorder, GI bleed, Glaucoma, Hyperlipidemia, Meniere syndrome, Obesity, Obstructive sleep apnea (adult) (pediatric), Peripheral vascular disease, unspecified (H), Pure hypercholesterolemia, Rheumatoid arthritis(714.0), Seizure disorder (H), Sjogren's disease  (H), Type II or unspecified type diabetes mellitus without mention of complication, not stated as uncontrolled, Unspecified cerebral artery occlusion with cerebral infarction, Unspecified epilepsy without mention of intractable epilepsy, and Unspecified essential hypertension.  PAST SURGICAL HISTORY:   has a past surgical history that includes tonsillectomy; Hysterectomy; GYN surgery; Breast surgery; Head and neck surgery; Cardiac surgery; Cardiac surgery; Abdomen surgery; vascular surgery; Phacoemulsification with standard intraocular lens implant (6/26/2014); cataract iol, rt/lt (2010); and Laser YAG capsulotomy (Bilateral, 8/27/2015).  FAMILY HISTORY: family history is not on file.  SOCIAL HISTORY:   reports that she has quit smoking. Her smoking use included cigarettes. She has a 30.00 pack-year smoking history. She has never used smokeless tobacco. She reports that she does not drink alcohol or use drugs.    Post Discharge Medication Reconciliation Status: discharge medications reconciled, continue medications without change  Current Outpatient Medications   Medication Sig Dispense Refill     acetaminophen (TYLENOL) 325 MG tablet Take 650 mg by mouth 3 times daily        amiodarone (PACERONE/CODARONE) 200 MG tablet Take 1 tablet (200 mg) by mouth daily 30 tablet 0     AMLODIPINE BESYLATE PO Take 5 mg by mouth daily        aspirin 81 MG EC tablet Take 81 mg by mouth daily       bumetanide (BUMEX) 1 MG tablet Take 1 mg by mouth 2 times daily       cefdinir (OMNICEF) 300 MG capsule Take 300 mg by mouth daily       CRANBERRY-VIT C-LACTOBACILLUS PO Take 1 tablet by mouth daily       diclofenac (VOLTAREN) 1 % topical gel Place 2 g onto the skin 4 times daily as needed (also HS scheduled) And also 2 gram topically at bedtime       DULoxetine (CYMBALTA) 60 MG EC capsule Take 60 mg by mouth 2 times daily       ergocalciferol (ERGOCALCIFEROL) 76546 units capsule Take 50,000 Units by mouth once a week       ferrous  sulfate (IRON) 325 (65 Fe) MG tablet Take 325 mg by mouth daily        fluconazole (DIFLUCAN) 50 MG tablet Take 1 tablet (50 mg) by mouth daily       fluticasone (FLOVENT DISKUS) 50 MCG/BLIST AEPB Inhale 2 puffs into the lungs daily        FOLIC ACID PO Take 1 mg by mouth daily       GABAPENTIN PO Take 300 mg by mouth 3 times daily        insulin aspart (NOVOLOG FLEXPEN) 100 UNIT/ML injection Inject 10 Units Subcutaneous 3 times daily (with meals) Also follow ssi: 0 - 69 = 0 SEE HYPOGLYCEMIA PROTOCOL; 70 - 149 = 0 NO INSULIN, GIVE PRANDIAL INSULIN IF ORDERED ; 150 - 199 = 2; 200 - 249 = 4; 250 - 299 = 6; 300 - 349 = 8; 350 - 399 = 10; 400+ = 12 GIVE 12 UNITS AND CALL MD, subcutaneously three times a day       insulin glargine (LANTUS) 100 UNIT/ML injection Inject 3 Units Subcutaneous At Bedtime        isosorbide mononitrate (IMDUR) 30 MG 24 hr tablet TAKE 1 TABLET BY MOUTH ONCE DAILY 30 tablet 0     Lactobacillus (PROBIOTIC ACIDOPHILUS PO) Take 1 tablet by mouth daily        latanoprost (XALATAN) 0.005 % ophthalmic solution Place 1 drop into both eyes At Bedtime       leflunomide (ARAVA) 20 MG tablet Take 1 tablet (20 mg) by mouth daily . Labs needed every 8-12 weeks. 90 tablet 1     levETIRAcetam (KEPPRA) 500 MG tablet Take 500 mg by mouth 2 times daily       Lidocaine (LIDOCARE) 4 % Patch Place 1 patch onto the skin daily as needed for moderate pain To prevent lidocaine toxicity, patient should be patch free for 12 hrs daily.       loperamide (IMODIUM A-D) 2 MG tablet Take 2 mg by mouth every 2 hours as needed for diarrhea       meclizine (ANTIVERT) 25 MG tablet Take 25 mg by mouth 3 times daily as needed        MELATONIN PO Take 3 mg by mouth At Bedtime        METOPROLOL TARTRATE PO Take 50 mg by mouth 2 times daily        mupirocin (BACTROBAN) 2 % ointment Apply topically 3 times daily as needed        nitroglycerin (NITROSTAT) 0.4 MG sublingual tablet Place 0.4 mg under the tongue every 5 minutes as needed for  chest pain For chest pain place 1 tablet under the tongue every 5 minutes for 3 doses. If symptoms persist 5 minutes after 1st dose call 911.       nystatin (MYCOSTATIN) 170879 UNIT/GM external powder Apply topically 2 times daily as needed        nystatin (MYCOSTATIN) 425991 UNIT/ML suspension Take 500,000 Units by mouth 4 times daily       ondansetron (ZOFRAN-ODT) 4 MG ODT tab Take 4 mg by mouth every 8 hours as needed        pantoprazole (PROTONIX) 40 MG EC tablet Take 40 mg by mouth daily       polyethylene glycol (MIRALAX/GLYCOLAX) packet Take 17 g by mouth daily as needed for constipation       POTASSIUM CHLORIDE PO Take 40 mEq by mouth 2 times daily        PRAVASTATIN SODIUM PO Take 40 mg by mouth every evening       senna-docusate (SENOKOT-S;PERICOLACE) 8.6-50 MG per tablet Take 1 tablet by mouth 2 times daily as needed        timolol (TIMOPTIC) 0.5 % ophthalmic solution Place 1 drop into both eyes 2 times daily Hold if HR <60       tiZANidine (ZANAFLEX) 2 MG tablet Take 2 mg by mouth every 6 hours as needed       TRAMADOL HCL PO Take 50 mg by mouth every 6 hours as needed for moderate to severe pain        triamcinolone (KENALOG) 0.1 % cream Apply topically 3 times daily as needed        Warfarin Sodium (COUMADIN PO) Take 2.5 mg by mouth daily Take every Tues/Wed/Thur/Fri/Sat/Sun - Recheck INR 12/15/2019         REVIEW OF SYSTEMS:   4 point ROS including Respiratory, CV, GI and , other than that noted in the HPI,  is negative    PHYSICAL EXAM:  BP (!) 157/73   Pulse 61   Temp 97.1  F (36.2  C)   Resp 16   Wt 68.8 kg (151 lb 9.6 oz)   SpO2 92%   BMI 25.23 kg/m    GENERAL APPEARANCE:  Alert, in no distress  RESP:  respiratory effort and palpation of chest normal, no respiratory distress, lungs sounds crackles on right base.  CV:  Palpation and auscultation of heart done , regular rate and rhythm, no murmur, rub, or gallop, edema none  ABDOMEN:  normal bowel sounds, soft, nontender,   M/S:  Gait and  station observed in bed, no tenderness or swelling of the joints   SKIN:  Inspection and palpation of skin and subcutaneous tissue at toes and heel with scabbed areas.   PSYCH:  insight and judgement, memory impaired, affect and mood normal    ASSESSMENT/PLAN:  Acute encephalopathy  Recent hospital stay for encephalopathy felt to be related to UTI and hypoglycemia. Had hallucinations and confusion. Resolving.     Chronic diastolic congestive heart failure (H)  Stable. No changes to regimen.     CKD (chronic kidney disease) stage 3, GFR 30-59 ml/min (H)  Creatinine   Date Value Ref Range Status   10/21/2019 1.32 (H) 0.52 - 1.04 mg/dL Final   stable. Keep kidney function in mind with medication changes and illness.  Check bmp on Friday.     Benign essential hypertension  Elevated today. Follow.     Falls frequently  Fell at her AL. Has scabbed areas on her feet. Will have Physical Therapy / Ocupational Therapy at TCU with goal to return to her AL apartment.     Chronic atrial fibrillation  Rate controlled on amiodarone. On warfarin for clot prevention.     Peripheral arterial disease (H)  On warfarin for clot prevention.     Type 2 diabetes mellitus with stage 3 chronic kidney disease, with long-term current use of insulin (H)  Insulin decreased at hospital. Blood sugar 300's today at noon. Continue to monitor.     Seizure disorder (H)  Continues on keppra. No recent events.     Chronic back pain  butrans patch was stopped during previous hospital stay as it was felt to be contributing to patients low appetite and confusion  But it is back on her list.  - discontinue butrans    Electronically signed by:  Manda MONROE CNP      Orders written by provider at facility and transcribed by : Virginia rAgueta MA  1.  INR 3.0 - Warfarin 1.25 mg po Mon/Tues/Wed.  Recheck INR on Thursday.  2.  Discontinue Buprenorphine patch.  3.  Discontinue Fluconazole in 2 weeks.

## 2020-01-02 ENCOUNTER — RECORDS - HEALTHEAST (OUTPATIENT)
Dept: LAB | Facility: CLINIC | Age: 80
End: 2020-01-02

## 2020-01-03 ENCOUNTER — TRANSFERRED RECORDS (OUTPATIENT)
Dept: HEALTH INFORMATION MANAGEMENT | Facility: CLINIC | Age: 80
End: 2020-01-03

## 2020-01-03 ENCOUNTER — NURSING HOME VISIT (OUTPATIENT)
Dept: GERIATRICS | Facility: CLINIC | Age: 80
End: 2020-01-03
Payer: MEDICARE

## 2020-01-03 VITALS
SYSTOLIC BLOOD PRESSURE: 134 MMHG | OXYGEN SATURATION: 95 % | RESPIRATION RATE: 18 BRPM | TEMPERATURE: 98.1 F | DIASTOLIC BLOOD PRESSURE: 75 MMHG | HEART RATE: 67 BPM | WEIGHT: 150.2 LBS | BODY MASS INDEX: 24.99 KG/M2

## 2020-01-03 DIAGNOSIS — N18.30 CKD (CHRONIC KIDNEY DISEASE) STAGE 3, GFR 30-59 ML/MIN (H): ICD-10-CM

## 2020-01-03 DIAGNOSIS — G93.40 ACUTE ENCEPHALOPATHY: Primary | ICD-10-CM

## 2020-01-03 DIAGNOSIS — G47.33 OBSTRUCTIVE SLEEP APNEA SYNDROME: ICD-10-CM

## 2020-01-03 DIAGNOSIS — E11.22 TYPE 2 DIABETES MELLITUS WITH STAGE 3 CHRONIC KIDNEY DISEASE, WITH LONG-TERM CURRENT USE OF INSULIN (H): ICD-10-CM

## 2020-01-03 DIAGNOSIS — I48.20 CHRONIC ATRIAL FIBRILLATION (H): ICD-10-CM

## 2020-01-03 DIAGNOSIS — I73.9 PERIPHERAL ARTERIAL DISEASE (H): ICD-10-CM

## 2020-01-03 DIAGNOSIS — E78.2 MIXED HYPERLIPIDEMIA: ICD-10-CM

## 2020-01-03 DIAGNOSIS — I10 BENIGN ESSENTIAL HYPERTENSION: ICD-10-CM

## 2020-01-03 DIAGNOSIS — N18.30 TYPE 2 DIABETES MELLITUS WITH STAGE 3 CHRONIC KIDNEY DISEASE, WITH LONG-TERM CURRENT USE OF INSULIN (H): ICD-10-CM

## 2020-01-03 DIAGNOSIS — I50.32 CHRONIC DIASTOLIC CONGESTIVE HEART FAILURE (H): ICD-10-CM

## 2020-01-03 DIAGNOSIS — Z79.4 TYPE 2 DIABETES MELLITUS WITH STAGE 3 CHRONIC KIDNEY DISEASE, WITH LONG-TERM CURRENT USE OF INSULIN (H): ICD-10-CM

## 2020-01-03 DIAGNOSIS — G40.909 SEIZURE DISORDER (H): ICD-10-CM

## 2020-01-03 DIAGNOSIS — R29.6 FALLS FREQUENTLY: ICD-10-CM

## 2020-01-03 LAB
ANION GAP SERPL CALCULATED.3IONS-SCNC: 11 MMOL/L (ref 5–18)
ANION GAP SERPL CALCULATED.3IONS-SCNC: 11 MMOL/L (ref 5–18)
BUN SERPL-MCNC: 16 MG/DL (ref 8–28)
BUN SERPL-MCNC: 16 MG/DL (ref 8–28)
CALCIUM SERPL-MCNC: 9 MG/DL (ref 8.5–10.5)
CALCIUM SERPL-MCNC: 9 MG/DL (ref 8.5–10.5)
CHLORIDE BLD-SCNC: 101 MMOL/L (ref 98–107)
CHLORIDE SERPLBLD-SCNC: 101 MMOL/L (ref 98–107)
CO2 SERPL-SCNC: 27 MMOL/L (ref 22–31)
CO2 SERPL-SCNC: 27 MMOL/L (ref 22–31)
CREAT SERPL-MCNC: 1.04 MG/DL (ref 0.6–1.1)
CREAT SERPL-MCNC: 1.04 MG/DL (ref 0.6–1.1)
ERYTHROCYTE [DISTWIDTH] IN BLOOD BY AUTOMATED COUNT: 20.5 % (ref 11–14.5)
ERYTHROCYTE [DISTWIDTH] IN BLOOD BY AUTOMATED COUNT: 20.5 % (ref 11–14.5)
GFR SERPL CREATININE-BSD FRML MDRD: 51 ML/MIN/1.73M2
GFR SERPL CREATININE-BSD FRML MDRD: 51 ML/MIN/1.73M2
GLUCOSE BLD-MCNC: 256 MG/DL (ref 70–125)
GLUCOSE SERPL-MCNC: 256 MG/DL (ref 70–125)
HCT VFR BLD AUTO: 33.9 % (ref 35–47)
HCT VFR BLD AUTO: 33.9 % (ref 35–47)
HEMOGLOBIN: 10 G/DL (ref 12–16)
HGB BLD-MCNC: 10 G/DL (ref 12–16)
MCH RBC QN AUTO: 29.9 PG (ref 27–34)
MCH RBC QN AUTO: 29.9 PG (ref 27–34)
MCHC RBC AUTO-ENTMCNC: 29.5 G/DL (ref 32–36)
MCHC RBC AUTO-ENTMCNC: 29.5 G/DL (ref 32–36)
MCV RBC AUTO: 102 FL (ref 80–100)
MCV RBC AUTO: 102 FL (ref 80–100)
PLATELET # BLD AUTO: 141 THOU/UL (ref 140–440)
PLATELET # BLD AUTO: 141 THOU/UL (ref 140–440)
PMV BLD AUTO: 12.2 FL (ref 8.5–12.5)
POTASSIUM BLD-SCNC: 3.8 MMOL/L (ref 3.5–5)
POTASSIUM SERPL-SCNC: 3.8 MMOL/L (ref 3.5–5)
RBC # BLD AUTO: 3.34 MILL/UL (ref 3.8–5.4)
RBC # BLD AUTO: 3.34 MILL/UL (ref 3.8–5.4)
SODIUM SERPL-SCNC: 139 MMOL/L (ref 136–145)
SODIUM SERPL-SCNC: 139 MMOL/L (ref 136–145)
WBC # BLD AUTO: 3.6 THOU/UL (ref 4–11)
WBC: 3.6 THOU/UL (ref 4–11)

## 2020-01-03 PROCEDURE — 99309 SBSQ NF CARE MODERATE MDM 30: CPT | Performed by: NURSE PRACTITIONER

## 2020-01-03 NOTE — PROGRESS NOTES
[facility]  PRIMARY CARE PROVIDER AND CLINIC:  Ga Allen, [facility] [address] [facility] / [facility] [address]  Chief Complaint   Patient presents with     RECHECK     [place] Medical Record Number:  [id number]  Place of Service where encounter took place:  [facility] (S) [[id number]]    Diya Cruz  is a 79 year old  (1940), admitted to the above facility from  [facility] . Hospital stay 12/23/19 through 12/27/19..  Admitted to this facility for  rehab, medical management and nursing care.    HPI information obtained from: patient report and Care Everywhere Epic chart review.   Brief Summary of Hospital Course: admitted to the hospital with hypoglycemia and encephalopathy.   MEDICATION CHANGES: lantus decreased to 3 units daily. novlog changed to sliding scale.   RECOMMENDED FOLLOW UP: with PCP at discharge from TCU.  Updates on Status Since Skilled nursing Admission: 12/30 therapy reports 150 feet contact guard. 1.  INR 3.0 - Warfarin 1.25 mg po Mon/Tues/Wed.  Recheck INR on Thursday. 2.  Discontinue Buprenorphine patch- does not want 3.  Discontinue Fluconazole in 2 weeks will complete regimen.   1/3 nursing reports no concerns. Therapy reports ambulating 200 feet with 2 wheeled walker.    CODE STATUS/ADVANCE DIRECTIVES DISCUSSION:   DNR  Patient's living condition: lives in an assisted living facility    ALLERGIES: Rosiglitazone; Ambien; Ambien [zolpidem]; Sulfa drugs; Azithromycin; Clindamycin; Codeine; Lisinopril; Nitrofurantoin; Simvastatin; and Triamterene-hctz [hydrochlorothiazide w/triamterene]  PAST MEDICAL HISTORY:  has a past medical history of Abdominal aortic stenosis, Anxiety, Basal cell carcinoma, Carotid stenosis, Cerebrovascular accident (CVA) (H) (8/19/2013), Claudication (H), Closed supracondylar fracture of left humerus with routine healing, subsequent encounter (12/8/2017), Clostridium difficile infection (5/8/2019), Compression  fracture, Depressive disorder, GI bleed, Glaucoma, Hyperlipidemia, Meniere syndrome, Obesity, Obstructive sleep apnea (adult) (pediatric), Peripheral vascular disease, unspecified (H), Pure hypercholesterolemia, Rheumatoid arthritis(714.0), Seizure disorder (H), Sjogren's disease (H), Type II or unspecified type diabetes mellitus without mention of complication, not stated as uncontrolled, Unspecified cerebral artery occlusion with cerebral infarction, Unspecified epilepsy without mention of intractable epilepsy, and Unspecified essential hypertension.  PAST SURGICAL HISTORY:   has a past surgical history that includes tonsillectomy; Hysterectomy; GYN surgery; Breast surgery; Head and neck surgery; Cardiac surgery; Cardiac surgery; Abdomen surgery; vascular surgery; Phacoemulsification with standard intraocular lens implant (6/26/2014); cataract iol, rt/lt (2010); and Laser YAG capsulotomy (Bilateral, 8/27/2015).  FAMILY HISTORY: family history is not on file.  SOCIAL HISTORY:   reports that she has quit smoking. Her smoking use included cigarettes. She has a 30.00 pack-year smoking history. She has never used smokeless tobacco. She reports that she does not drink alcohol or use drugs.    Current Outpatient Medications   Medication Sig Dispense Refill     acetaminophen (TYLENOL) 325 MG tablet Take 650 mg by mouth 3 times daily        amiodarone (PACERONE/CODARONE) 200 MG tablet Take 1 tablet (200 mg) by mouth daily 30 tablet 0     AMLODIPINE BESYLATE PO Take 5 mg by mouth daily        aspirin 81 MG EC tablet Take 81 mg by mouth daily       bumetanide (BUMEX) 1 MG tablet Take 1 mg by mouth 2 times daily       CRANBERRY-VIT C-LACTOBACILLUS PO Take 1 tablet by mouth daily       DULoxetine (CYMBALTA) 60 MG EC capsule Take 60 mg by mouth 2 times daily       ergocalciferol (ERGOCALCIFEROL) 33255 units capsule Take 50,000 Units by mouth once a week       ferrous sulfate (IRON) 325 (65 Fe) MG tablet Take 325 mg by mouth  daily        fluconazole (DIFLUCAN) 50 MG tablet Take 1 tablet (50 mg) by mouth daily       fluticasone (FLOVENT DISKUS) 50 MCG/BLIST AEPB Inhale 2 puffs into the lungs daily        FOLIC ACID PO Take 1 mg by mouth daily       GABAPENTIN PO Take 300 mg by mouth 3 times daily        insulin aspart (NOVOLOG FLEXPEN) 100 UNIT/ML injection Inject 10 Units Subcutaneous 3 times daily (with meals) Also follow ssi: 0 - 69 = 0 SEE HYPOGLYCEMIA PROTOCOL; 70 - 149 = 0 NO INSULIN, GIVE PRANDIAL INSULIN IF ORDERED ; 150 - 199 = 2; 200 - 249 = 4; 250 - 299 = 6; 300 - 349 = 8; 350 - 399 = 10; 400+ = 12 GIVE 12 UNITS AND CALL MD, subcutaneously three times a day       insulin glargine (LANTUS) 100 UNIT/ML injection Inject 3 Units Subcutaneous At Bedtime        isosorbide mononitrate (IMDUR) 30 MG 24 hr tablet TAKE 1 TABLET BY MOUTH ONCE DAILY 30 tablet 0     Lactobacillus (PROBIOTIC ACIDOPHILUS PO) Take 1 tablet by mouth daily        latanoprost (XALATAN) 0.005 % ophthalmic solution Place 1 drop into both eyes At Bedtime       leflunomide (ARAVA) 20 MG tablet Take 1 tablet (20 mg) by mouth daily . Labs needed every 8-12 weeks. 90 tablet 1     levETIRAcetam (KEPPRA) 500 MG tablet Take 500 mg by mouth 2 times daily       Lidocaine (LIDOCARE) 4 % Patch Place 1 patch onto the skin daily as needed for moderate pain To prevent lidocaine toxicity, patient should be patch free for 12 hrs daily.       loperamide (IMODIUM A-D) 2 MG tablet Take 2 mg by mouth every 2 hours as needed for diarrhea       meclizine (ANTIVERT) 25 MG tablet Take 25 mg by mouth 3 times daily as needed        MELATONIN PO Take 3 mg by mouth At Bedtime        menthol (ICY HOT) 5 % PTCH Apply 1 patch topically daily as needed for muscle soreness Apply to affected area topically as needed for arthritis pain       METOPROLOL TARTRATE PO Take 50 mg by mouth 2 times daily        mupirocin (BACTROBAN) 2 % ointment Apply topically 3 times daily as needed        nitroglycerin  (NITROSTAT) 0.4 MG sublingual tablet Place 0.4 mg under the tongue every 5 minutes as needed for chest pain For chest pain place 1 tablet under the tongue every 5 minutes for 3 doses. If symptoms persist 5 minutes after 1st dose call 911.       nystatin (MYCOSTATIN) 707892 UNIT/GM external powder Apply topically 2 times daily as needed        ondansetron (ZOFRAN-ODT) 4 MG ODT tab Take 4 mg by mouth every 8 hours as needed        pantoprazole (PROTONIX) 40 MG EC tablet Take 40 mg by mouth daily       polyethylene glycol (MIRALAX/GLYCOLAX) packet Take 17 g by mouth daily as needed for constipation       POTASSIUM CHLORIDE PO Take 40 mEq by mouth 2 times daily        PRAVASTATIN SODIUM PO Take 40 mg by mouth every evening       senna-docusate (SENOKOT-S;PERICOLACE) 8.6-50 MG per tablet Take 1 tablet by mouth 2 times daily as needed        timolol (TIMOPTIC) 0.5 % ophthalmic solution Place 1 drop into both eyes 2 times daily Hold if HR <60       tiZANidine (ZANAFLEX) 2 MG tablet Take 2 mg by mouth every 6 hours as needed       TRAMADOL HCL PO Take 50 mg by mouth every 6 hours as needed for moderate to severe pain        triamcinolone (KENALOG) 0.1 % cream Apply topically 3 times daily as needed        Warfarin Sodium (COUMADIN PO) Take 2.5 mg by mouth daily Give 1.25 mg by mouth one time a day for . until 01/05/2020 23:59   PHARMACIST RECOMMENDATION FOR WARFARIN DOSING UPON HOSPITAL D/C: OUTPATIENT WARFARIN MANAGEMENT: DR. DERRICK [facility]: [address] [place] [address]/[place] [id number] PRIOR TO ADMISSION HOME DOSE: 1.25 MG DAILY EXCEPT 2.5 MG EVERY MONDAY AND FRIDAY. TARGET INR: 2.5 (RANGE 2-3) INDICATION: ATRIAL FIBRILLATION/CVA HX RECHECK INR: 2-3 AFTER D/C SPECIAL CONSIDERATIONS: NONE AT THIS TIME       diclofenac (VOLTAREN) 1 % topical gel Place 2 g onto the skin 4 times daily as needed (also HS scheduled) And also 2 gram topically at bedtime       nystatin (MYCOSTATIN) 371982 UNIT/ML suspension Take  500,000 Units by mouth 4 times daily         REVIEW OF SYSTEMS:   4 point ROS including Respiratory, CV, GI and , other than that noted in the HPI,  is negative    PHYSICAL EXAM:  /75   Pulse 67   Temp 98.1  F (36.7  C)   Resp 18   Wt 68.1 kg (150 lb 3.2 oz)   SpO2 95%   BMI 24.99 kg/m    GENERAL APPEARANCE:  Alert, in no distress  RESP:  respiratory effort and palpation of chest normal, no respiratory distress, lungs sounds crackles on right base.  CV:  Palpation and auscultation of heart done , regular rate and rhythm, no murmur, rub, or gallop, edema none  ABDOMEN:  normal bowel sounds, soft, nontender,   M/S:  Gait and station observed in bed, no tenderness or swelling of the joints   SKIN:  Inspection and palpation of skin and subcutaneous tissue at toes and heel with scabbed areas.   PSYCH:  insight and judgement, memory impaired, affect and mood normal    ASSESSMENT/PLAN:  Acute encephalopathy  Recent hospital stay for encephalopathy felt to be related to UTI and hypoglycemia. Had hallucinations and confusion. Resolving.     Chronic diastolic congestive heart failure (H)  Stable. No changes to regimen.     CKD (chronic kidney disease) stage 3, GFR 30-59 ml/min (H)  Creatinine   Date Value Ref Range Status   10/21/2019 1.32 (H) 0.52 - 1.04 mg/dL Final   stable. Keep kidney function in mind with medication changes and illness.  Check bmp on Friday.     Benign essential hypertension  BP Readings from Last 3 Encounters:   01/02/20 134/75   12/30/19 (!) 157/73   12/18/19 125/81    controlled on current regimen.     Falls frequently  Fell at her AL. Has scabbed areas on her feet. Will have Physical Therapy / Ocupational Therapy at TCU with goal to return to her AL apartment. Recommend she have someone with her for ambulation and transfers.     Chronic atrial fibrillation  Rate controlled on amiodarone. On warfarin for clot prevention.     Peripheral arterial disease (H)  On warfarin for clot  prevention. She has several scabbed areas on her toes and heel that we need to watch due to co morbid diabetes and vascular disease. No current signs of infection.     Type 2 diabetes mellitus with stage 3 chronic kidney disease, with long-term current use of insulin (H)  glargine  Decreased to 3 units at hospital due to hypoglycemia. Blood sugars elevated since admission. Fasting blood sugars 200 - 300. Will increase glargine to 8 units.     Seizure disorder (H)  Continues on keppra. No recent events.     Chronic back pain  Complains of constant back pain. Not using tramadol as she does not think it helps. Currently using muscle relaxer.     Electronically signed by:  Manda MONROE CNP    Orders written by provider at facility and transcribed by : Virginia Argueta MA   1. Paint scabbed areas on toes and heel with betadine once daily.   2. Manohar stockings knee high on AM and off at HS

## 2020-01-05 ENCOUNTER — TELEPHONE (OUTPATIENT)
Dept: GERIATRICS | Facility: CLINIC | Age: 80
End: 2020-01-05

## 2020-01-06 ENCOUNTER — NURSING HOME VISIT (OUTPATIENT)
Dept: GERIATRICS | Facility: CLINIC | Age: 80
End: 2020-01-06
Payer: MEDICARE

## 2020-01-06 VITALS
RESPIRATION RATE: 18 BRPM | TEMPERATURE: 98 F | WEIGHT: 147.4 LBS | DIASTOLIC BLOOD PRESSURE: 77 MMHG | BODY MASS INDEX: 24.53 KG/M2 | SYSTOLIC BLOOD PRESSURE: 133 MMHG | HEART RATE: 64 BPM | OXYGEN SATURATION: 90 %

## 2020-01-06 DIAGNOSIS — R29.6 FALLS FREQUENTLY: ICD-10-CM

## 2020-01-06 DIAGNOSIS — E11.22 TYPE 2 DIABETES MELLITUS WITH STAGE 3 CHRONIC KIDNEY DISEASE, WITH LONG-TERM CURRENT USE OF INSULIN (H): ICD-10-CM

## 2020-01-06 DIAGNOSIS — N18.30 TYPE 2 DIABETES MELLITUS WITH STAGE 3 CHRONIC KIDNEY DISEASE, WITH LONG-TERM CURRENT USE OF INSULIN (H): ICD-10-CM

## 2020-01-06 DIAGNOSIS — I50.32 CHRONIC DIASTOLIC CONGESTIVE HEART FAILURE (H): Primary | ICD-10-CM

## 2020-01-06 DIAGNOSIS — N18.30 CKD (CHRONIC KIDNEY DISEASE) STAGE 3, GFR 30-59 ML/MIN (H): ICD-10-CM

## 2020-01-06 DIAGNOSIS — I10 BENIGN ESSENTIAL HYPERTENSION: ICD-10-CM

## 2020-01-06 DIAGNOSIS — G47.33 OBSTRUCTIVE SLEEP APNEA SYNDROME: ICD-10-CM

## 2020-01-06 DIAGNOSIS — R41.89 COGNITIVE IMPAIRMENT: ICD-10-CM

## 2020-01-06 DIAGNOSIS — G40.909 SEIZURE DISORDER (H): ICD-10-CM

## 2020-01-06 DIAGNOSIS — I48.20 CHRONIC ATRIAL FIBRILLATION (H): ICD-10-CM

## 2020-01-06 DIAGNOSIS — Z79.4 TYPE 2 DIABETES MELLITUS WITH STAGE 3 CHRONIC KIDNEY DISEASE, WITH LONG-TERM CURRENT USE OF INSULIN (H): ICD-10-CM

## 2020-01-06 DIAGNOSIS — E78.2 MIXED HYPERLIPIDEMIA: ICD-10-CM

## 2020-01-06 DIAGNOSIS — I73.9 PERIPHERAL ARTERIAL DISEASE (H): ICD-10-CM

## 2020-01-06 PROCEDURE — 99309 SBSQ NF CARE MODERATE MDM 30: CPT | Performed by: NURSE PRACTITIONER

## 2020-01-06 NOTE — PROGRESS NOTES
[facility]  PRIMARY CARE PROVIDER AND CLINIC:  Ga Allen, [facility] [address] [facility] / [facility] [address]  Chief Complaint   Patient presents with     RECHECK     [facility] Medical Record Number:  [id number]  Place of Service where encounter took place:  [facility] (S) [[id number]]    Diya Cruz  is a 79 year old  (1940), admitted to the above facility from  [facility] . Hospital stay 12/23/19 through 12/27/19..  Admitted to this facility for  rehab, medical management and nursing care.    HPI information obtained from: patient report and Care Everywhere Epic chart review.   Brief Summary of Hospital Course: admitted to the hospital with hypoglycemia and encephalopathy.   MEDICATION CHANGES: lantus decreased to 3 units daily. novlog changed to sliding scale.   RECOMMENDED FOLLOW UP: with PCP at discharge from TCU.  Updates on Status Since Skilled nursing Admission: 12/30 therapy reports 150 feet contact guard. 1.  INR 3.0 - Warfarin 1.25 mg po Mon/Tues/Wed.  Recheck INR on Thursday. 2.  Discontinue Buprenorphine patch- does not want 3.  Discontinue Fluconazole in 2 weeks will complete regimen.   1/3 nursing reports no concerns. Therapy reports ambulating 200 feet with 2 wheeled walker.    CODE STATUS/ADVANCE DIRECTIVES DISCUSSION:   DNR  Patient's living condition: lives in an assisted living facility    ALLERGIES: Rosiglitazone; Ambien; Ambien [zolpidem]; Sulfa drugs; Azithromycin; Clindamycin; Codeine; Lisinopril; Nitrofurantoin; Simvastatin; and Triamterene-hctz [hydrochlorothiazide w/triamterene]  PAST MEDICAL HISTORY:  has a past medical history of Abdominal aortic stenosis, Anxiety, Basal cell carcinoma, Carotid stenosis, Cerebrovascular accident (CVA) (H) (8/19/2013), Claudication (H), Closed supracondylar fracture of left humerus with routine healing, subsequent encounter (12/8/2017), Clostridium difficile infection (5/8/2019), Compression  fracture, Depressive disorder, GI bleed, Glaucoma, Hyperlipidemia, Meniere syndrome, Obesity, Obstructive sleep apnea (adult) (pediatric), Peripheral vascular disease, unspecified (H), Pure hypercholesterolemia, Rheumatoid arthritis(714.0), Seizure disorder (H), Sjogren's disease (H), Type II or unspecified type diabetes mellitus without mention of complication, not stated as uncontrolled, Unspecified cerebral artery occlusion with cerebral infarction, Unspecified epilepsy without mention of intractable epilepsy, and Unspecified essential hypertension.  PAST SURGICAL HISTORY:   has a past surgical history that includes tonsillectomy; Hysterectomy; GYN surgery; Breast surgery; Head and neck surgery; Cardiac surgery; Cardiac surgery; Abdomen surgery; vascular surgery; Phacoemulsification with standard intraocular lens implant (6/26/2014); cataract iol, rt/lt (2010); and Laser YAG capsulotomy (Bilateral, 8/27/2015).  FAMILY HISTORY: family history is not on file.  SOCIAL HISTORY:   reports that she has quit smoking. Her smoking use included cigarettes. She has a 30.00 pack-year smoking history. She has never used smokeless tobacco. She reports that she does not drink alcohol or use drugs.    Current Outpatient Medications   Medication Sig Dispense Refill     acetaminophen (TYLENOL) 325 MG tablet Take 650 mg by mouth 3 times daily        amiodarone (PACERONE/CODARONE) 200 MG tablet Take 1 tablet (200 mg) by mouth daily 30 tablet 0     AMLODIPINE BESYLATE PO Take 5 mg by mouth daily        aspirin 81 MG EC tablet Take 81 mg by mouth daily       bumetanide (BUMEX) 1 MG tablet Take 1 mg by mouth 2 times daily       CRANBERRY-VIT C-LACTOBACILLUS PO Take 1 tablet by mouth daily       diclofenac (VOLTAREN) 1 % topical gel Place 2 g onto the skin 4 times daily as needed (also HS scheduled) And also 2 gram topically at bedtime       DULoxetine (CYMBALTA) 60 MG EC capsule Take 60 mg by mouth 2 times daily       ergocalciferol  (ERGOCALCIFEROL) 26603 units capsule Take 50,000 Units by mouth once a week       ferrous sulfate (IRON) 325 (65 Fe) MG tablet Take 325 mg by mouth daily        fluconazole (DIFLUCAN) 50 MG tablet Take 1 tablet (50 mg) by mouth daily       fluticasone (FLOVENT DISKUS) 50 MCG/BLIST AEPB Inhale 2 puffs into the lungs daily        FOLIC ACID PO Take 1 mg by mouth daily       GABAPENTIN PO Take 300 mg by mouth 3 times daily        insulin aspart (NOVOLOG FLEXPEN) 100 UNIT/ML injection Inject 10 Units Subcutaneous 3 times daily (with meals) Also follow ssi: 0 - 69 = 0 SEE HYPOGLYCEMIA PROTOCOL; 70 - 149 = 0 NO INSULIN, GIVE PRANDIAL INSULIN IF ORDERED ; 150 - 199 = 2; 200 - 249 = 4; 250 - 299 = 6; 300 - 349 = 8; 350 - 399 = 10; 400+ = 12 GIVE 12 UNITS AND CALL MD, subcutaneously three times a day       insulin aspart (NOVOLOG PEN) 100 UNIT/ML pen Inject 6 Units Subcutaneous 3 times daily (with meals)        insulin glargine (LANTUS) 100 UNIT/ML injection Inject 8 Units Subcutaneous At Bedtime        isosorbide mononitrate (IMDUR) 30 MG 24 hr tablet TAKE 1 TABLET BY MOUTH ONCE DAILY 30 tablet 0     Lactobacillus (PROBIOTIC ACIDOPHILUS PO) Take 1 tablet by mouth daily        latanoprost (XALATAN) 0.005 % ophthalmic solution Place 1 drop into both eyes At Bedtime       leflunomide (ARAVA) 20 MG tablet Take 1 tablet (20 mg) by mouth daily . Labs needed every 8-12 weeks. 90 tablet 1     levETIRAcetam (KEPPRA) 500 MG tablet Take 500 mg by mouth 2 times daily       Lidocaine (LIDOCARE) 4 % Patch Place 1 patch onto the skin daily as needed for moderate pain To prevent lidocaine toxicity, patient should be patch free for 12 hrs daily.       loperamide (IMODIUM A-D) 2 MG tablet Take 2 mg by mouth every 2 hours as needed for diarrhea       meclizine (ANTIVERT) 25 MG tablet Take 25 mg by mouth 3 times daily as needed        MELATONIN PO Take 3 mg by mouth At Bedtime        menthol (ICY HOT) 5 % PTCH Apply 1 patch topically daily as  needed for muscle soreness Apply to affected area topically as needed for arthritis pain       METOPROLOL TARTRATE PO Take 50 mg by mouth 2 times daily        mupirocin (BACTROBAN) 2 % ointment Apply topically 3 times daily as needed        nitroglycerin (NITROSTAT) 0.4 MG sublingual tablet Place 0.4 mg under the tongue every 5 minutes as needed for chest pain For chest pain place 1 tablet under the tongue every 5 minutes for 3 doses. If symptoms persist 5 minutes after 1st dose call 911.       nystatin (MYCOSTATIN) 617383 UNIT/GM external powder Apply topically 2 times daily as needed        nystatin (MYCOSTATIN) 821667 UNIT/ML suspension Take 500,000 Units by mouth 4 times daily       ondansetron (ZOFRAN-ODT) 4 MG ODT tab Take 4 mg by mouth every 8 hours as needed        pantoprazole (PROTONIX) 40 MG EC tablet Take 40 mg by mouth daily       polyethylene glycol (MIRALAX/GLYCOLAX) packet Take 17 g by mouth daily as needed for constipation       POTASSIUM CHLORIDE PO Take 40 mEq by mouth 2 times daily        PRAVASTATIN SODIUM PO Take 40 mg by mouth every evening       senna-docusate (SENOKOT-S;PERICOLACE) 8.6-50 MG per tablet Take 1 tablet by mouth 2 times daily as needed        timolol (TIMOPTIC) 0.5 % ophthalmic solution Place 1 drop into both eyes 2 times daily Hold if HR <60       tiZANidine (ZANAFLEX) 2 MG tablet Take 2 mg by mouth every 6 hours as needed       TRAMADOL HCL PO Take 50 mg by mouth every 6 hours as needed for moderate to severe pain        triamcinolone (KENALOG) 0.1 % cream Apply topically 3 times daily as needed        Warfarin Sodium (COUMADIN PO) Take 2.5 mg by mouth daily Give 1.25 mg by mouth one time a day for . until 01/05/2020 23:59   PHARMACIST RECOMMENDATION FOR WARFARIN DOSING UPON HOSPITAL D/C: OUTPATIENT WARFARIN MANAGEMENT: DR. DERRICK [facility]: [address] [place] [address] [address] [address] PRIOR TO ADMISSION HOME DOSE: 1.25 MG DAILY EXCEPT 2.5 MG EVERY MONDAY AND  FRIDAY. TARGET INR: 2.5 (RANGE 2-3) INDICATION: ATRIAL FIBRILLATION/CVA HX RECHECK INR: 2-3 AFTER D/C SPECIAL CONSIDERATIONS: NONE AT THIS TIME         REVIEW OF SYSTEMS:   4 point ROS including Respiratory, CV, GI and , other than that noted in the HPI,  is negative    PHYSICAL EXAM:  /77   Pulse 64   Temp 98  F (36.7  C)   Resp 18   Wt 66.9 kg (147 lb 6.4 oz)   SpO2 90%   BMI 24.53 kg/m    GENERAL APPEARANCE:  Alert, in no distress  RESP:  respiratory effort and palpation of chest normal, no respiratory distress, lungs sounds crackles on right base.  CV:  Palpation and auscultation of heart done , regular rate and rhythm, no murmur, rub, or gallop, edema none  ABDOMEN:  normal bowel sounds, soft, nontender,   M/S:  Gait and station observed in bed, no tenderness or swelling of the joints   SKIN:  Inspection and palpation of skin and subcutaneous tissue at toes and heel with scabbed areas.   PSYCH:  insight and judgement, memory impaired, affect and mood normal    ASSESSMENT/PLAN:  Cognitive impairment  Family voices concerns over patients care at the AL. She is getting the most services but frequently refuses them or does not call for help. ACL 4.2 / 5.8 indicating need for daily checks. Continue supportive care.  following for discharge planning.     Chronic diastolic congestive heart failure (H)  Stable. No changes to regimen.     CKD (chronic kidney disease) stage 3, GFR 30-59 ml/min (H)  Creatinine   Date Value Ref Range Status   01/07/2020 1.40 (A) 0.60 - 1.10 mg/dL Final   stable. Keep kidney function in mind with medication changes and illness.    Benign essential hypertension  BP Readings from Last 3 Encounters:   01/07/20 133/69   01/06/20 133/77   01/02/20 134/75    controlled on current regimen.     Falls frequently  Fell at her AL. Has scabbed areas on her feet. Will have Physical Therapy / Ocupational Therapy at TCU with goal to return to her AL apartment. Recommend she  have someone with her for ambulation and transfers.     Chronic atrial fibrillation  Rate controlled on amiodarone. On warfarin for clot prevention.   INR done and warfarin dosed today.     Peripheral arterial disease (H)  On warfarin for clot prevention. She has several scabbed areas on her toes and heel that we need to watch due to co morbid diabetes and vascular disease. No current signs of infection. Family have concerns over hx of osteomyelitis. Will check labs. And follow up. Ok for wound MD consult.     Type 2 diabetes mellitus with stage 3 chronic kidney disease, with long-term current use of insulin (H)  glargine  Decreased to 3 units at hospital due to hypoglycemia. Blood sugars elevated since admission. Meal time blood sugars 300 - 400. glargine increased to 8 units. Will add scheduled novolog.     Seizure disorder (H)  Continues on keppra. No recent events.     Chronic back pain  Complains of constant back pain. Not using tramadol as she does not think it helps. Currently using muscle relaxer.     Electronically signed by:  Manda MONROE CNP    Orders written by provider at facility and transcribed by : Virginia Argueta MA   1.  Add Aspart 5 units subcutaneous TID w/meals.    2.  Check CBC, BMP tomorrow.  Dx: CKD3  3.  INR 1.6 - Warfarin 2 mg po every day.  Recheck INR Friday.

## 2020-01-06 NOTE — TELEPHONE ENCOUNTER
Patient with type 2 DM. Nursing reports patient's blood sugar 460 at 4:30PM, prior to dinner. Reports blood sugar at lunch check was 469. Is currently on lantus at bedtime and novolog sliding scale with meals. Give 12 units of novolog before meal. Call back if blood sugar remains elevated at next check. Follow up with primary NP tomorrow.

## 2020-01-07 ENCOUNTER — TRANSFERRED RECORDS (OUTPATIENT)
Dept: HEALTH INFORMATION MANAGEMENT | Facility: CLINIC | Age: 80
End: 2020-01-07

## 2020-01-07 ENCOUNTER — RECORDS - HEALTHEAST (OUTPATIENT)
Dept: LAB | Facility: CLINIC | Age: 80
End: 2020-01-07

## 2020-01-07 VITALS
BODY MASS INDEX: 24.66 KG/M2 | DIASTOLIC BLOOD PRESSURE: 69 MMHG | RESPIRATION RATE: 18 BRPM | HEART RATE: 68 BPM | WEIGHT: 148.2 LBS | SYSTOLIC BLOOD PRESSURE: 133 MMHG | TEMPERATURE: 98 F | OXYGEN SATURATION: 98 %

## 2020-01-07 LAB
ANION GAP SERPL CALCULATED.3IONS-SCNC: 10 MMOL/L (ref 5–18)
ANION GAP SERPL CALCULATED.3IONS-SCNC: 10 MMOL/L (ref 5–18)
BUN SERPL-MCNC: 24 MG/DL (ref 8–28)
BUN SERPL-MCNC: 24 MG/DL (ref 8–28)
CALCIUM SERPL-MCNC: 9.4 MG/DL (ref 8.5–10.5)
CALCIUM SERPL-MCNC: 9.4 MG/DL (ref 8.5–10.5)
CHLORIDE BLD-SCNC: 102 MMOL/L (ref 98–107)
CHLORIDE SERPLBLD-SCNC: 102 MMOL/L (ref 98–107)
CO2 SERPL-SCNC: 27 MMOL/L (ref 22–31)
CO2 SERPL-SCNC: 27 MMOL/L (ref 22–31)
CREAT SERPL-MCNC: 1.4 MG/DL (ref 0.6–1.1)
CREAT SERPL-MCNC: 1.4 MG/DL (ref 0.6–1.1)
ERYTHROCYTE [DISTWIDTH] IN BLOOD BY AUTOMATED COUNT: 20.1 % (ref 11–14.5)
GFR SERPL CREATININE-BSD FRML MDRD: 36 ML/MIN/1.73M2
GFR SERPL CREATININE-BSD FRML MDRD: 36 ML/MIN/1.73M2
GLUCOSE BLD-MCNC: 219 MG/DL (ref 70–125)
GLUCOSE SERPL-MCNC: 219 MG/DL (ref 70–125)
HCT VFR BLD AUTO: 33.6 % (ref 35–47)
HGB BLD-MCNC: 9.9 G/DL (ref 12–16)
MCH RBC QN AUTO: 29.7 PG (ref 27–34)
MCHC RBC AUTO-ENTMCNC: 29.5 G/DL (ref 32–36)
MCV RBC AUTO: 101 FL (ref 80–100)
PLATELET # BLD AUTO: 170 THOU/UL (ref 140–440)
PMV BLD AUTO: 11.1 FL (ref 8.5–12.5)
POTASSIUM BLD-SCNC: 3.9 MMOL/L (ref 3.5–5)
POTASSIUM SERPL-SCNC: 3.9 MMOL/L (ref 3.5–5)
RBC # BLD AUTO: 3.33 MILL/UL (ref 3.8–5.4)
SODIUM SERPL-SCNC: 139 MMOL/L (ref 136–145)
SODIUM SERPL-SCNC: 139 MMOL/L (ref 136–145)
WBC: 3.6 THOU/UL (ref 4–11)

## 2020-01-08 ENCOUNTER — NURSING HOME VISIT (OUTPATIENT)
Dept: GERIATRICS | Facility: CLINIC | Age: 80
End: 2020-01-08
Payer: MEDICARE

## 2020-01-08 DIAGNOSIS — N18.30 TYPE 2 DIABETES MELLITUS WITH STAGE 3 CHRONIC KIDNEY DISEASE, WITH LONG-TERM CURRENT USE OF INSULIN (H): ICD-10-CM

## 2020-01-08 DIAGNOSIS — G40.909 SEIZURE DISORDER (H): ICD-10-CM

## 2020-01-08 DIAGNOSIS — F03.90 DEMENTIA WITHOUT BEHAVIORAL DISTURBANCE, UNSPECIFIED DEMENTIA TYPE: ICD-10-CM

## 2020-01-08 DIAGNOSIS — Z79.4 TYPE 2 DIABETES MELLITUS WITH STAGE 3 CHRONIC KIDNEY DISEASE, WITH LONG-TERM CURRENT USE OF INSULIN (H): ICD-10-CM

## 2020-01-08 DIAGNOSIS — F33.0 MILD EPISODE OF RECURRENT MAJOR DEPRESSIVE DISORDER (H): ICD-10-CM

## 2020-01-08 DIAGNOSIS — I48.20 CHRONIC ATRIAL FIBRILLATION (H): ICD-10-CM

## 2020-01-08 DIAGNOSIS — E11.22 TYPE 2 DIABETES MELLITUS WITH STAGE 3 CHRONIC KIDNEY DISEASE, WITH LONG-TERM CURRENT USE OF INSULIN (H): ICD-10-CM

## 2020-01-08 DIAGNOSIS — I77.9 PERIPHERAL ARTERIAL OCCLUSIVE DISEASE (H): ICD-10-CM

## 2020-01-08 DIAGNOSIS — M06.9 RHEUMATOID ARTHRITIS, INVOLVING UNSPECIFIED SITE, UNSPECIFIED RHEUMATOID FACTOR PRESENCE: ICD-10-CM

## 2020-01-08 PROCEDURE — 99305 1ST NF CARE MODERATE MDM 35: CPT | Performed by: FAMILY MEDICINE

## 2020-01-09 ENCOUNTER — RECORDS - HEALTHEAST (OUTPATIENT)
Dept: LAB | Facility: CLINIC | Age: 80
End: 2020-01-09

## 2020-01-09 PROBLEM — R41.89 COGNITIVE IMPAIRMENT: Status: ACTIVE | Noted: 2017-12-08

## 2020-01-10 LAB — ERYTHROCYTE [SEDIMENTATION RATE] IN BLOOD BY WESTERGREN METHOD: 21 MM/HR (ref 0–20)

## 2020-01-13 ENCOUNTER — DISCHARGE SUMMARY NURSING HOME (OUTPATIENT)
Dept: GERIATRICS | Facility: CLINIC | Age: 80
End: 2020-01-13
Payer: MEDICARE

## 2020-01-13 VITALS
TEMPERATURE: 97.9 F | DIASTOLIC BLOOD PRESSURE: 66 MMHG | WEIGHT: 149.4 LBS | HEART RATE: 62 BPM | RESPIRATION RATE: 18 BRPM | BODY MASS INDEX: 24.86 KG/M2 | OXYGEN SATURATION: 92 % | SYSTOLIC BLOOD PRESSURE: 122 MMHG

## 2020-01-13 DIAGNOSIS — E78.2 MIXED HYPERLIPIDEMIA: ICD-10-CM

## 2020-01-13 DIAGNOSIS — G40.909 SEIZURE DISORDER (H): ICD-10-CM

## 2020-01-13 DIAGNOSIS — I50.32 CHRONIC DIASTOLIC CONGESTIVE HEART FAILURE (H): Primary | ICD-10-CM

## 2020-01-13 DIAGNOSIS — N18.30 TYPE 2 DIABETES MELLITUS WITH STAGE 3 CHRONIC KIDNEY DISEASE, WITH LONG-TERM CURRENT USE OF INSULIN (H): ICD-10-CM

## 2020-01-13 DIAGNOSIS — N18.30 CKD (CHRONIC KIDNEY DISEASE) STAGE 3, GFR 30-59 ML/MIN (H): ICD-10-CM

## 2020-01-13 DIAGNOSIS — I10 BENIGN ESSENTIAL HYPERTENSION: ICD-10-CM

## 2020-01-13 DIAGNOSIS — R41.89 COGNITIVE IMPAIRMENT: ICD-10-CM

## 2020-01-13 DIAGNOSIS — R29.6 FALLS FREQUENTLY: ICD-10-CM

## 2020-01-13 DIAGNOSIS — I77.9 PERIPHERAL ARTERIAL OCCLUSIVE DISEASE (H): ICD-10-CM

## 2020-01-13 DIAGNOSIS — Z79.4 TYPE 2 DIABETES MELLITUS WITH STAGE 3 CHRONIC KIDNEY DISEASE, WITH LONG-TERM CURRENT USE OF INSULIN (H): ICD-10-CM

## 2020-01-13 DIAGNOSIS — G47.33 OBSTRUCTIVE SLEEP APNEA SYNDROME: ICD-10-CM

## 2020-01-13 DIAGNOSIS — E11.22 TYPE 2 DIABETES MELLITUS WITH STAGE 3 CHRONIC KIDNEY DISEASE, WITH LONG-TERM CURRENT USE OF INSULIN (H): ICD-10-CM

## 2020-01-13 DIAGNOSIS — I48.20 CHRONIC ATRIAL FIBRILLATION (H): ICD-10-CM

## 2020-01-13 PROBLEM — M06.4 INFLAMMATORY POLYARTHROPATHY (H): Status: RESOLVED | Noted: 2019-10-21 | Resolved: 2020-01-13

## 2020-01-13 PROBLEM — Z86.69 HX OF SEIZURE DISORDER: Status: RESOLVED | Noted: 2017-12-08 | Resolved: 2020-01-13

## 2020-01-13 PROCEDURE — 99316 NF DSCHRG MGMT 30 MIN+: CPT | Performed by: NURSE PRACTITIONER

## 2020-01-13 ASSESSMENT — ENCOUNTER SYMPTOMS
ARTHRALGIAS: 0
CONSTIPATION: 0
HEADACHES: 0
EYE PAIN: 0
FEVER: 0
COUGH: 0
FREQUENCY: 0
SHORTNESS OF BREATH: 0
HEARTBURN: 0
SORE THROAT: 0
DIZZINESS: 0
ABDOMINAL PAIN: 0
NAUSEA: 0
APPETITE CHANGE: 0

## 2020-01-13 NOTE — PROGRESS NOTES
HISTORY OF PRESENT ILLNESS:  Diya is a 79 year old female, (1940), admitted to [facility] from [facility]. Hospital stay 12/23/19 through 12/27/19.  Admitted to this facility for  rehab, medical management and nursing care.     HPI information obtained from: patient report, geriatric nurse practitioner and Care Everywhere Epic chart review.     Brief Summary of Hospital Course: admitted to the hospital with hypoglycemia and encephalopathy.   MEDICATION CHANGES: lantus decreased to 3 units daily. novlog changed to sliding scale.   RECOMMENDED FOLLOW UP: with PCP at discharge from TCU.    Updates on Status Since Skilled nursing Admission: 12/30 therapy reports 150 feet contact guard. 1.  INR 3.0 - Warfarin 1.25 mg po Mon/Tues/Wed.  Recheck INR on Thursday. 2.  Discontinue Buprenorphine patch- does not want 3.  Discontinue Fluconazole in 2 weeks will complete regimen.   1/3 nursing reports no concerns. Therapy reports ambulating 200 feet with 2 wheeled walker.    Past Medical History/  Patient Active Problem List    Diagnosis Date Noted     Acute encephalopathy 12/24/2019     Priority: Medium     Dementia (H) 12/02/2019     Priority: Medium     Weight loss 11/22/2019     Priority: Medium     Coronary artery disease involving native coronary artery of native heart without angina pectoris 11/13/2019     Priority: Medium     Chronic anemia 11/13/2019     Priority: Medium     Hx of encephalopathy 05/08/2019     Priority: Medium     Advanced directives, counseling/discussion 12/14/2017     Priority: Medium     Falls frequently 12/14/2017     Priority: Medium     Cognitive impairment 12/08/2017     Priority: Medium     Elevated LFTs 07/31/2017     Priority: Medium     Physical deconditioning 07/31/2017     Priority: Medium     Chronic atrial fibrillation 07/31/2017     Priority: Medium     Benign essential hypertension 07/31/2017     Priority: Medium     Chronic diastolic congestive heart failure  (H) 07/31/2017     Priority: Medium     CKD (chronic kidney disease) stage 3, GFR 30-59 ml/min (H) 07/31/2017     Priority: Medium     Presence of cardiac pacemaker 01/31/2017     Priority: Medium     Date of last device in office evaluation: 8/9/2019     ?  and model: Medtronic Advisa  Pacemaker  Date of implant: 1/4/17      ? Indication for device: Sick sinus syndrome, paroxysmal atrial fibrillation    ? Cardiac resynchronization therapy:  no    ? Battery longevity documented as less than 3  Months: no  ? Are any of the leads less than 3 months old: no    ? Programming              ? Pacing mode and programmed lower rate: AAIR-DDDR 60 bpm              ? Rate-responsive sensor type, if programmed on: accelerometer        Underlying rhythm and heart rate if it can be determined:  No intrinsic P or R waves at VVI 40 bpm      ? What is the response of this device to magnet placement: asynchronous  ? PM magnet pacing rate: 85 bpm   ? Any alert status on CIED generator or lead: no    ? Last pacing threshold    Atrial   1.0 V @ 0.40 ms           Ventricular RV: 0.5 V @ 0.40 ms       Sick sinus syndrome (H) 01/13/2017     Priority: Medium     Nondiabetic proliferative retinopathy 08/08/2016     Priority: Medium     Chronic back pain 03/29/2016     Priority: Medium     Multiple-type hyperlipidemia 01/26/2016     Priority: Medium     Mass of breast 07/23/2015     Priority: Medium     Fatigue 07/22/2015     Priority: Medium     Lung nodule < 6cm on CT 07/10/2015     Priority: Medium     Overview:   Lung nodules. Following with Dr. Pennington, due for repeat CT 1/2016       Stenosis of carotid artery 06/12/2015     Priority: Medium     Candidal intertrigo 04/10/2015     Priority: Medium     Microalbuminuria 01/26/2015     Priority: Medium     Mild episode of recurrent major depressive disorder (H) 01/26/2015     Priority: Medium     Type 2 diabetes mellitus (H) 01/26/2015     Priority: Medium     Spinal stenosis of  lumbar region 01/23/2015     Priority: Medium     History of adenomatous polyp of colon 11/22/2013     Priority: Medium     History of endovascular stent graft for abdominal aortic aneurysm 11/22/2013     Priority: Medium     Anxiety 10/22/2013     Priority: Medium     Seasonal allergic rhinitis 09/03/2013     Priority: Medium     Seizure disorder (H) 09/03/2013     Priority: Medium     Overview:   Possible reaction to Remicade, takes Keppra       Glaucoma 08/19/2013     Priority: Medium     Hypoferremia 08/19/2013     Priority: Medium     Overview:   Source in distal small bowel per patient (identified on pill cam). History transfusions.       Obstructive sleep apnea syndrome 08/19/2013     Priority: Medium     Overview:   Unable to tolerate CPAP       Peripheral arterial occlusive disease (H) 08/19/2013     Priority: Medium     Overview:   8/8/01 - 18-mm infrarenal aortic stent with angioplasty   8/23/01, Angioplasty Rt prox SFA, thrombolysis Rt CFA and SFA.  7/14/11 - bilateral GLENN stents       Rheumatoid arthritis (H) 08/19/2013     Priority: Medium     Overview:   Dr. Trevino         Past Surgical History:   Procedure Laterality Date     ABDOMEN SURGERY      AAA Repair, abdominal aorta and illiac stents placed     BREAST SURGERY      Breast Biopsy     CARDIAC SURGERY      Aortic Stent Graft     CARDIAC SURGERY      Cardiac Catheterization     CATARACT IOL, RT/LT  2010    Right     GYN SURGERY      Hysterectomy     HEAD & NECK SURGERY      Tonsillectomy     HYSTERECTOMY      age 41     LASER YAG CAPSULOTOMY Bilateral 8/27/2015    Procedure: LASER YAG CAPSULOTOMY;  Surgeon: Gus Gregorio MD;  Location: [facility] OR     PHACOEMULSIFICATION WITH STANDARD INTRAOCULAR LENS IMPLANT  6/26/2014    Procedure: PHACOEMULSIFICATION WITH STANDARD INTRAOCULAR LENS IMPLANT;  Surgeon: Gus Gregorio MD;  Location: [facility] OR     TONSILLECTOMY      age 22     VASCULAR SURGERY      Angio extremity bilateral       History  reviewed. No pertinent family history.        Social History     Socioeconomic History     Marital status:      Spouse name: Not on file     Number of children: Not on file     Years of education: Not on file     Highest education level: Not on file   Occupational History     Not on file   Social Needs     Financial resource strain: Not on file     Food insecurity:     Worry: Not on file     Inability: Not on file     Transportation needs:     Medical: Not on file     Non-medical: Not on file   Tobacco Use     Smoking status: Former Smoker     Packs/day: 1.00     Years: 30.00     Pack years: 30.00     Types: Cigarettes     Smokeless tobacco: Never Used   Substance and Sexual Activity     Alcohol use: No     Drug use: No     Sexual activity: Never   Lifestyle     Physical activity:     Days per week: Not on file     Minutes per session: Not on file     Stress: Not on file   Relationships     Social connections:     Talks on phone: Not on file     Gets together: Not on file     Attends Scientologist service: Not on file     Active member of club or organization: Not on file     Attends meetings of clubs or organizations: Not on file     Relationship status: Not on file     Intimate partner violence:     Fear of current or ex partner: Not on file     Emotionally abused: Not on file     Physically abused: Not on file     Forced sexual activity: Not on file   Other Topics Concern     Parent/sibling w/ CABG, MI or angioplasty before 65F 55M? Not Asked   Social History Narrative     Not on file        Current Outpatient Medications   Medication Sig     acetaminophen (TYLENOL) 325 MG tablet Take 650 mg by mouth 3 times daily      amiodarone (PACERONE/CODARONE) 200 MG tablet Take 1 tablet (200 mg) by mouth daily     AMLODIPINE BESYLATE PO Take 5 mg by mouth daily      aspirin 81 MG EC tablet Take 81 mg by mouth daily     bumetanide (BUMEX) 1 MG tablet Take 1 mg by mouth 2 times daily     CRANBERRY-VIT C-LACTOBACILLUS PO  Take 1 tablet by mouth daily     diclofenac (VOLTAREN) 1 % topical gel Place 2 g onto the skin 4 times daily as needed (also HS scheduled) And also 2 gram topically at bedtime     DULoxetine (CYMBALTA) 60 MG EC capsule Take 60 mg by mouth 2 times daily     ergocalciferol (ERGOCALCIFEROL) 83168 units capsule Take 50,000 Units by mouth once a week     ferrous sulfate (IRON) 325 (65 Fe) MG tablet Take 325 mg by mouth daily      fluconazole (DIFLUCAN) 50 MG tablet Take 1 tablet (50 mg) by mouth daily     fluticasone (FLOVENT DISKUS) 50 MCG/BLIST AEPB Inhale 2 puffs into the lungs daily      FOLIC ACID PO Take 1 mg by mouth daily     GABAPENTIN PO Take 300 mg by mouth 3 times daily      insulin aspart (NOVOLOG FLEXPEN) 100 UNIT/ML injection Inject 10 Units Subcutaneous 3 times daily (with meals) Also follow ssi: 0 - 69 = 0 SEE HYPOGLYCEMIA PROTOCOL; 70 - 149 = 0 NO INSULIN, GIVE PRANDIAL INSULIN IF ORDERED ; 150 - 199 = 2; 200 - 249 = 4; 250 - 299 = 6; 300 - 349 = 8; 350 - 399 = 10; 400+ = 12 GIVE 12 UNITS AND CALL MD, subcutaneously three times a day     insulin aspart (NOVOLOG PEN) 100 UNIT/ML pen Inject 6 Units Subcutaneous 3 times daily (with meals)      insulin glargine (LANTUS) 100 UNIT/ML injection Inject 8 Units Subcutaneous At Bedtime      isosorbide mononitrate (IMDUR) 30 MG 24 hr tablet TAKE 1 TABLET BY MOUTH ONCE DAILY     Lactobacillus (PROBIOTIC ACIDOPHILUS PO) Take 1 tablet by mouth daily      latanoprost (XALATAN) 0.005 % ophthalmic solution Place 1 drop into both eyes At Bedtime     leflunomide (ARAVA) 20 MG tablet Take 1 tablet (20 mg) by mouth daily . Labs needed every 8-12 weeks.     levETIRAcetam (KEPPRA) 500 MG tablet Take 500 mg by mouth 2 times daily     Lidocaine (LIDOCARE) 4 % Patch Place 1 patch onto the skin daily as needed for moderate pain To prevent lidocaine toxicity, patient should be patch free for 12 hrs daily.     loperamide (IMODIUM A-D) 2 MG tablet Take 2 mg by mouth every 2 hours as  needed for diarrhea     meclizine (ANTIVERT) 25 MG tablet Take 25 mg by mouth 3 times daily as needed      MELATONIN PO Take 3 mg by mouth At Bedtime      menthol (ICY HOT) 5 % PTCH Apply 1 patch topically daily as needed for muscle soreness Apply to affected area topically as needed for arthritis pain     METOPROLOL TARTRATE PO Take 50 mg by mouth 2 times daily      mupirocin (BACTROBAN) 2 % ointment Apply topically 3 times daily as needed      nitroglycerin (NITROSTAT) 0.4 MG sublingual tablet Place 0.4 mg under the tongue every 5 minutes as needed for chest pain For chest pain place 1 tablet under the tongue every 5 minutes for 3 doses. If symptoms persist 5 minutes after 1st dose call 911.     nystatin (MYCOSTATIN) 605325 UNIT/GM external powder Apply topically 2 times daily as needed      nystatin (MYCOSTATIN) 364434 UNIT/ML suspension Take 500,000 Units by mouth 4 times daily     ondansetron (ZOFRAN-ODT) 4 MG ODT tab Take 4 mg by mouth every 8 hours as needed      pantoprazole (PROTONIX) 40 MG EC tablet Take 40 mg by mouth daily     polyethylene glycol (MIRALAX/GLYCOLAX) packet Take 17 g by mouth daily as needed for constipation     POTASSIUM CHLORIDE PO Take 40 mEq by mouth 2 times daily      PRAVASTATIN SODIUM PO Take 40 mg by mouth every evening     senna-docusate (SENOKOT-S;PERICOLACE) 8.6-50 MG per tablet Take 1 tablet by mouth 2 times daily as needed      timolol (TIMOPTIC) 0.5 % ophthalmic solution Place 1 drop into both eyes 2 times daily Hold if HR <60     tiZANidine (ZANAFLEX) 2 MG tablet Take 2 mg by mouth every 6 hours as needed     TRAMADOL HCL PO Take 50 mg by mouth every 6 hours as needed for moderate to severe pain      triamcinolone (KENALOG) 0.1 % cream Apply topically 3 times daily as needed      Warfarin Sodium (COUMADIN PO) Take 2.5 mg by mouth daily Give 1.25 mg by mouth one time a day for . until 01/05/2020 23:59   PHARMACIST RECOMMENDATION FOR WARFARIN DOSING UPON HOSPITAL D/C:  OUTPATIENT WARFARIN MANAGEMENT: DR. DERRICK THIBODEAUX [facility]: [address] [facility] [address] [address] PRIOR TO ADMISSION HOME DOSE: 1.25 MG DAILY EXCEPT 2.5 MG EVERY MONDAY AND FRIDAY. [facility] INR: 2.5 (RANGE 2-3) INDICATION: ATRIAL FIBRILLATION/CVA HX RECHECK INR: 2-3 AFTER D/C SPECIAL CONSIDERATIONS: NONE AT THIS TIME     No current facility-administered medications for this visit.        Allergies   Allergen Reactions     Rosiglitazone Hives     Ambien      Sleepwalking     Ambien [Zolpidem] Other (See Comments) and Unknown     Sleepwalking     Sulfa Drugs Unknown     Lightheaded, GI upset  Lightheaded, GI upset  Lightheaded, GI upset     Azithromycin Diarrhea     Clindamycin Diarrhea     Codeine Nausea and Vomiting     Other reaction(s): Vomiting  Other reaction(s): Vomiting     Lisinopril Cough     Nitrofurantoin Diarrhea     Simvastatin Diarrhea     Triamterene-Hctz [Hydrochlorothiazide W/Triamterene] Swelling and Nausea     Other reaction(s): Myalgia       Information reviewed:  Medications, vital signs, orders, and nursing notes.    Review of Systems   Constitutional: Negative for appetite change and fever.   HENT: Negative for congestion, ear pain and sore throat.    Eyes: Negative for pain.   Respiratory: Negative for cough and shortness of breath.    Cardiovascular: Positive for peripheral edema. Negative for chest pain.   Gastrointestinal: Negative for abdominal pain, constipation, heartburn and nausea.   Genitourinary: Negative for frequency.   Musculoskeletal: Negative for arthralgias.   Skin:        Bilateral feet with crusting lesions secondary to her fall   Neurological: Negative for dizziness and headaches.   Psychiatric/Behavioral: Negative for mood changes.        OBJECTIVE:  /69   Pulse 68   Temp 98  F (36.7  C)   Resp 18   Wt 67.2 kg (148 lb 3.2 oz)   SpO2 98%   BMI 24.66 kg/m    Physical Exam  Constitutional:       General: She is not in acute distress.     Appearance:  She is well-developed.   HENT:      Right Ear: External ear normal.      Left Ear: External ear normal.      Nose: Nose normal.   Eyes:      General:         Right eye: No discharge.         Left eye: No discharge.      Conjunctiva/sclera: Conjunctivae normal.      Pupils: Pupils are equal, round, and reactive to light.   Neck:      Musculoskeletal: Neck supple.      Thyroid: No thyromegaly.      Trachea: No tracheal deviation.   Cardiovascular:      Rate and Rhythm: Normal rate and regular rhythm.      Heart sounds: Normal heart sounds, S1 normal and S2 normal. No murmur.   Pulmonary:      Effort: Pulmonary effort is normal. No respiratory distress.      Breath sounds: Normal breath sounds. No wheezing or rales.   Abdominal:      General: Bowel sounds are normal.      Palpations: Abdomen is soft. There is no mass.      Tenderness: There is no abdominal tenderness.   Musculoskeletal: Normal range of motion.      Right lower leg: Edema present.      Left lower leg: Edema present.   Lymphadenopathy:      Cervical: No cervical adenopathy.   Skin:     General: Skin is warm and dry.      Findings: No rash.      Comments: Bilateral feet with crusting lesions, no erythema, no purulent discharge.   Neurological:      Mental Status: She is alert.      Motor: No abnormal muscle tone.      Deep Tendon Reflexes: Reflexes are normal and symmetric.   Psychiatric:         Mood and Affect: Mood normal.         Behavior: Behavior is cooperative.          ASSESSMENT/PLAN:    Dementia (H)  ACL 4.2 / 5.8 indicating need for daily checks. Continue supportive care.  following for discharge planning.        Seizure disorder (H)  Remains on Keppra.  No recent seizure.    Type 2 diabetes mellitus (H)  Insulin was decreased at the hospital secondary to her hypoglycemia.  Currently her blood sugars are ranging 140s to 400s.  Have slowly been increasing her insulin now that she is eating.  We will continue to adjust.    Chronic  atrial fibrillation (H)  Remains on warfarin for clot prevention.  She is on amiodarone and metoprolol for rate control.    Rheumatoid arthritis (H)  Remains on Arava.    Mild episode of recurrent major depressive disorder (H)  Mood stable.  Continue Cymbalta.    Peripheral arterial occlusive disease (H)  Has several scabbed areas on her toes which she states is related to her falls.  We will have wound provider see her.          Axia Esparza MD

## 2020-01-13 NOTE — ASSESSMENT & PLAN NOTE
Insulin was decreased at the hospital secondary to her hypoglycemia.  Currently her blood sugars are ranging 140s to 400s.  Have slowly been increasing her insulin now that she is eating.  We will continue to adjust.

## 2020-01-13 NOTE — PROGRESS NOTES
[place] [facility]IATRIC SERVICES DISCHARGE SUMMARY    PATIENT'S NAME: Diya Cruz  YOB: 1940  MEDICAL RECORD NUMBER:  [id number]  Place of Service where encounter took place:  [facility] [[id number]]    PRIMARY CARE PROVIDER AND CLINIC RESPONSIBLE AFTER TRANSFER: [facility], [place] [address] [facility] / [facility] [address]    Non-G Provider     Transferring providers: Manda Molina NP / Aixa Esparza MD  Recent Hospitalization/ED:   [facility]. Hospital stay 12/23/19 through 12/27/19.  Date of SNF Admission: 12/27  Date of SNF (anticipated) Discharge: 1/15  Discharged to: previous assisted living  Cognitive Scores: BIMS 12/15; ACL 4.2/5.8  Physical Function: Ambulating 200 feet with walker and standby assist  DME: None    CODE STATUS/ADVANCE DIRECTIVES DISCUSSION:  DNR / DNI  ALLERGIES: Rosiglitazone; Ambien; Ambien [zolpidem]; Sulfa drugs; Azithromycin; Clindamycin; Codeine; Lisinopril; Nitrofurantoin; Simvastatin; and Triamterene-hctz [hydrochlorothiazide w/triamterene]    DISCHARGE DIAGNOSIS/NURSING FACILITY COURSE:   Brief Summary of Hospital Course: admitted to the hospital with hypoglycemia and encephalopathy.   MEDICATION CHANGES: lantus decreased to 3 units daily. novlog changed to sliding scale.   RECOMMENDED FOLLOW UP: with PCP at discharge from TCU.     Updates on Status Since Skilled nursing Admission: 12/30 therapy reports 150 feet contact guard. 1.  INR 3.0 - Warfarin 1.25 mg po Mon/Tues/Wed.  Recheck INR on Thursday. 2.  Discontinue Buprenorphine patch- does not want 3.  Discontinue Fluconazole in 2 weeks will complete regimen.   1/3 nursing reports no concerns. Therapy reports ambulating 200 feet with 2 wheeled walker. Order written to pain scabs on both feet with Betadine. Glargine increased from 3 units daily to 8 units daily.   1/6 blood sugars still high. Add aspart 5 units subcutaneous tid with meals.    1/8 started on doxycycline for 10  days per wound MD evaluation    Cognitive impairment  Encephalopathy resolved.  Family voices concerns over patients care at the AL. She is getting the most services but frequently refuses them or does not call for help. ACL 4.2 / 5.8 indicating need for daily checks. Continue supportive care.  following for discharge planning.   Long-term care is appropriate should she agree.     Chronic diastolic congestive heart failure (H)  Stable. No changes to regimen.      CKD (chronic kidney disease) stage 3, GFR 30-59 ml/min (H)  stable. Keep kidney function in mind with medication changes and illness.     Benign essential hypertension   controlled on current regimen.      Falls frequently  Fell at her AL. Has scabbed areas on her feet. Will have Physical Therapy / Ocupational Therapy at TCU with goal to return to her AL apartment. Recommend she have someone with her for ambulation and transfers.      Chronic atrial fibrillation  Rate controlled on amiodarone. On warfarin for clot prevention.   INR done and warfarin dosed today.      Peripheral arterial disease (H)  On warfarin for clot prevention. She has several scabbed areas on her toes and heel that we need to watch due to co morbid diabetes and vascular disease. Wound MD consulted and ordered antibiotics. Painting scabs with betadine.     Type 2 diabetes mellitus with stage 3 chronic kidney disease, with long-term current use of insulin (H)  glargine  Decreased to 3 units at hospital due to hypoglycemia. Blood sugars elevated since admission. Meal time blood sugars 300 - 400. glargine increased to 8 units. And novlog scheduled at 5 units subcutaneous TID. Blood sugars are 150 - 250 currently. Recommend avoiding tight control due to recent hypoglycemia episode.      Seizure disorder (H)  Continues on keppra. No recent events.      Chronic back pain  Complains of constant back pain. Not using tramadol as she does not think it helps. Currently using muscle  relaxer.       PAST MEDICAL HISTORY:  has a past medical history of Abdominal aortic stenosis, Anxiety, Basal cell carcinoma, Carotid stenosis, Cerebrovascular accident (CVA) (H) (8/19/2013), Claudication (H), Closed supracondylar fracture of left humerus with routine healing, subsequent encounter (12/8/2017), Clostridium difficile infection (5/8/2019), Compression fracture, Depressive disorder, GI bleed, Glaucoma, Hyperlipidemia, Meniere syndrome, Obesity, Obstructive sleep apnea (adult) (pediatric), Peripheral vascular disease, unspecified (H), Pure hypercholesterolemia, Rheumatoid arthritis(714.0), Seizure disorder (H), Sjogren's disease (H), Type II or unspecified type diabetes mellitus without mention of complication, not stated as uncontrolled, Unspecified cerebral artery occlusion with cerebral infarction, Unspecified epilepsy without mention of intractable epilepsy, and Unspecified essential hypertension.    DISCHARGE MEDICATIONS:  Current Outpatient Medications   Medication Sig Dispense Refill     acetaminophen (TYLENOL) 325 MG tablet Take 650 mg by mouth 3 times daily        amiodarone (PACERONE/CODARONE) 200 MG tablet Take 1 tablet (200 mg) by mouth daily 30 tablet 0     AMLODIPINE BESYLATE PO Take 5 mg by mouth daily        aspirin 81 MG EC tablet Take 81 mg by mouth daily       bumetanide (BUMEX) 1 MG tablet Take 1 mg by mouth 2 times daily       CRANBERRY-VIT C-LACTOBACILLUS PO Take 1 tablet by mouth daily       diclofenac (VOLTAREN) 1 % topical gel Place 2 g onto the skin 4 times daily as needed (also HS scheduled) And also 2 gram topically at bedtime       DULoxetine (CYMBALTA) 60 MG EC capsule Take 60 mg by mouth 2 times daily       ergocalciferol (ERGOCALCIFEROL) 69194 units capsule Take 50,000 Units by mouth once a week       ferrous sulfate (IRON) 325 (65 Fe) MG tablet Take 325 mg by mouth daily        fluconazole (DIFLUCAN) 50 MG tablet Take 1 tablet (50 mg) by mouth daily       fluticasone  (FLOVENT DISKUS) 50 MCG/BLIST AEPB Inhale 2 puffs into the lungs daily        FOLIC ACID PO Take 1 mg by mouth daily       GABAPENTIN PO Take 300 mg by mouth 3 times daily        insulin aspart (NOVOLOG FLEXPEN) 100 UNIT/ML injection Inject 10 Units Subcutaneous 3 times daily (with meals) Also follow ssi: 0 - 69 = 0 SEE HYPOGLYCEMIA PROTOCOL; 70 - 149 = 0 NO INSULIN, GIVE PRANDIAL INSULIN IF ORDERED ; 150 - 199 = 2; 200 - 249 = 4; 250 - 299 = 6; 300 - 349 = 8; 350 - 399 = 10; 400+ = 12 GIVE 12 UNITS AND CALL MD, subcutaneously three times a day       insulin aspart (NOVOLOG PEN) 100 UNIT/ML pen Inject 6 Units Subcutaneous 3 times daily (with meals)        insulin glargine (LANTUS) 100 UNIT/ML injection Inject 8 Units Subcutaneous At Bedtime        isosorbide mononitrate (IMDUR) 30 MG 24 hr tablet TAKE 1 TABLET BY MOUTH ONCE DAILY 30 tablet 0     Lactobacillus (PROBIOTIC ACIDOPHILUS PO) Take 1 tablet by mouth daily        latanoprost (XALATAN) 0.005 % ophthalmic solution Place 1 drop into both eyes At Bedtime       leflunomide (ARAVA) 20 MG tablet Take 1 tablet (20 mg) by mouth daily . Labs needed every 8-12 weeks. 90 tablet 1     levETIRAcetam (KEPPRA) 500 MG tablet Take 500 mg by mouth 2 times daily       loperamide (IMODIUM A-D) 2 MG tablet Take 2 mg by mouth every 2 hours as needed for diarrhea       meclizine (ANTIVERT) 25 MG tablet Take 25 mg by mouth 3 times daily as needed        MELATONIN PO Take 3 mg by mouth At Bedtime        METOPROLOL TARTRATE PO Take 50 mg by mouth 2 times daily        mupirocin (BACTROBAN) 2 % ointment Apply topically 3 times daily as needed        nitroglycerin (NITROSTAT) 0.4 MG sublingual tablet Place 0.4 mg under the tongue every 5 minutes as needed for chest pain For chest pain place 1 tablet under the tongue every 5 minutes for 3 doses. If symptoms persist 5 minutes after 1st dose call 911.       nystatin (MYCOSTATIN) 784625 UNIT/GM external powder Apply topically 2 times daily  as needed        nystatin (MYCOSTATIN) 417398 UNIT/ML suspension Take 500,000 Units by mouth 4 times daily       ondansetron (ZOFRAN-ODT) 4 MG ODT tab Take 4 mg by mouth every 8 hours as needed        pantoprazole (PROTONIX) 40 MG EC tablet Take 40 mg by mouth daily       polyethylene glycol (MIRALAX/GLYCOLAX) packet Take 17 g by mouth daily as needed for constipation       POTASSIUM CHLORIDE PO Take 40 mEq by mouth 2 times daily        PRAVASTATIN SODIUM PO Take 40 mg by mouth every evening       senna-docusate (SENOKOT-S;PERICOLACE) 8.6-50 MG per tablet Take 1 tablet by mouth 2 times daily as needed        timolol (TIMOPTIC) 0.5 % ophthalmic solution Place 1 drop into both eyes 2 times daily Hold if HR <60       tiZANidine (ZANAFLEX) 2 MG tablet Take 2 mg by mouth every 6 hours as needed       TRAMADOL HCL PO Take 50 mg by mouth every 6 hours as needed for moderate to severe pain        triamcinolone (KENALOG) 0.1 % cream Apply topically 3 times daily as needed        Warfarin Sodium (COUMADIN PO) Take 2.5 mg by mouth daily Give 1.25 mg by mouth one time a day for . until 01/05/2020 23:59   PHARMACIST RECOMMENDATION FOR WARFARIN DOSING UPON HOSPITAL D/C: OUTPATIENT WARFARIN MANAGEMENT: DR. DERRICK [facility]AUX [facility]: [address] [place] [address]/[place] [address] PRIOR TO ADMISSION HOME DOSE: 1.25 MG DAILY EXCEPT 2.5 MG EVERY MONDAY AND FRIDAY. [facility] INR: 2.5 (RANGE 2-3) INDICATION: ATRIAL FIBRILLATION/CVA HX RECHECK INR: 2-3 AFTER D/C SPECIAL CONSIDERATIONS: NONE AT THIS TIME          ROS:    4 point ROS including Respiratory, CV, GI and , other than that noted in the HPI,  is negative    Physical Exam:   Vitals: /66   Pulse 62   Temp 97.9  F (36.6  C)   Resp 18   Wt 67.8 kg (149 lb 6.4 oz)   SpO2 92%   BMI 24.86 kg/m    BMI= Body mass index is 24.86 kg/m .  General: alert, in no distress    DISCHARGE PLAN:  Occupational Therapy, Physical Therapy, Registered Nurse and Home Health Aide  Patient  instructed to follow-up with:  PCP in 7 days      [facility] scheduled appointments:  Future Appointments   Date Time Provider Department Center   4/20/2020  9:20 AM [facility] and placed by this provider: No    Pending labs: none  SNF labs: 1/7 sodium 139 potassium 3.9 CO2 27 glucose 219 calcium 9.4 BUN 24 creatinine 1.4 GFR 36 white blood count 3.6 hemoglobin 9.9 platelets 170    Discharge Treatments:  - Ok to discharge to home with current medications and treatments  - Home Physical Therapy / Ocupational Therapy / RN / HHA  - Follow up with Primary care provider in 1 week  - Scripts written: Tramadol prescription written    TOTAL DISCHARGE TIME:   Greater than 30 minutes  Electronically signed by:  Manda Molina NP      Orders written by provider at facility and transcribed by : Virginia Argueta MA   1.  Discontinue Lidocaine patch and Icy hot patch.  2.  INR 1.9  3.  Warfarin 2.5 mg po every day.  Recheck INR on Friday.  Dx: A Fib

## 2020-01-13 NOTE — ASSESSMENT & PLAN NOTE
ACL 4.2 / 5.8 indicating need for daily checks. Continue supportive care.  following for discharge planning.

## 2020-04-13 ENCOUNTER — TELEPHONE (OUTPATIENT)
Dept: RHEUMATOLOGY | Facility: CLINIC | Age: 80
End: 2020-04-13

## 2022-05-02 NOTE — LETTER
5/22/2019        RE: Diya Cruz  [facility]  [id number] [facility] [address]        [place] [facility]IAT[facility]IC [facility] DISCHARGE SUMMARY  PATIENT'S NAME: Diya Cruz  YOB: 1940  MEDICAL RECORD NUMBER:  [id number]  Place of [facility] where encounter took place[facility] [[id number]]    PRIMARY CARE PROVIDER AND CLINIC RESPONSIBLE AFTER TRANSFER:   [facility] [address] / [facility] [address]   Non-FMG Provider     Transferring providers: Manda Molina CNP, MD Adan  Recent Hospitalization/ED:  [facility]  stay 11/28/17 to 12/4/17.  Date of SNF Admission: December / 04 / 2017  Date of SNF (anticipated) Discharge: May / 28 / 2019  Discharged to: previous assisted living  Cognitive Scores: bim 15/15  Physical Function: abulates 100 feet with walker  DME: Walker    CODE STATUS/ADVANCE DIRECTIVES DISCUSSION:  DNR / DNI   ALLERGIES: Rosiglitazone; Ambien; Ambien [zolpidem]; Sulfa drugs; Azithromycin; Clindamycin; Codeine; Lisinopril; Nitrofurantoin; Simvastatin; and Triamterene-hctz [hydrochlorothiazide w/triamterene]    DISCHARGE DIAGNOSIS/NURSING FACILITY COURSE:   Brief Summary of Hospital Course: Presented to the hospital with acute encephalopathy relating to a reaction to baclofen. She was found to have E. Coli UTI and completed antibiotic treatment. Also presented in acute on chronic heart failure and was diuresed with IV Bumex then switched to oral torsemide. Also noted to have C. Difficile colitis and started Vancomycin treatment in hospital.   Updates on Status Since Skilled nursing Admission:  5/13 MD visit. tramadol ordered 50 mg bid PRN 5/15 Tramadol 50 mg PO TID and 50 mg once daily as needed 5/17 INR 1.5-Warfarin 4 mg po Friday and Sat, 2 mg Sunday.  2.  Check INR on Monday 5/20/19. 5/22 seen for discharge summary and extended vanco x 2 weeks    Chronic diastolic congestive heart  failure  (H)  Presented to the hospital with acute on chronic CHF. Diuresed with IV Bumex then transitioned to oral torsemide.  Has been stable at TCU on torsemide.  -Continue torsemide, 2L Fluid restriction      Hx of encephalopathy  Presented to the hospital for acute encephalopathy were reaction to Baclofen was suspected and E.coli UTI. Admitted to this facility for physical and occupational therapy due to confusion and subsequent risk for falls. Confusion  improved.  She will return to her previous assisted living apartment  -Continue physical and occupational therapy      Clostridium difficile infection  Noted to have Cdifficile infection in hospital and was started on oral vancomycin.  Began having diarrhea after vancomycin was completed and vancomycin was restarted today and will continue for 2 more weeks  -Continue Vancomycin QID through June 5     Type 2 diabetes mellitus with stage 3 chronic kidney disease, with long-term current use of insulin (H)  Diabetes managed with lantus and novolog. With IV diuresis in hospital and antibiotics, continue to monitor kidney function.  Blood sugars well controlled while at tcu blood sugar range in the last 4 days was .     Chronic atrial fibrillation (H)   Rate controlled with amiodarone and metoprolol. Anticoagulated with Warfarin.  Next INR will be due 5/31     Mild episode of recurrent major depressive disorder (H)  Managed with Cymbalta.      Seizure disorder (H)  Chronic. Stable. Managed with Keppra.    Midline back pain  Chronic ongoing problem. previous ER visits due to uncontrolled pain.  Restarted tramadol during TCU stay. recommend she follow-up with ispine as discussed with her primary care provider.  Recommend avoiding muscle relaxer due to concerns of confusion    Past Medical History:  has a past medical history of Abdominal aortic stenosis, Anxiety, Basal cell carcinoma, Carotid stenosis, Cerebrovascular accident (CVA) (H) (8/19/2013), Claudication (H),  Closed supracondylar fracture of left humerus with routine healing, subsequent encounter (12/8/2017), Compression fracture, Depressive disorder, GI bleed, Glaucoma, Hyperlipidemia, Meniere syndrome, Obesity, Obstructive sleep apnea (adult) (pediatric), Peripheral vascular disease, unspecified (H), Pure hypercholesterolemia, Rheumatoid arthritis(714.0), Seizure disorder (H), Sjogren's disease (H), Type II or unspecified type diabetes mellitus without mention of complication, not stated as uncontrolled, Unspecified cerebral artery occlusion with cerebral infarction, Unspecified epilepsy without mention of intractable epilepsy, and Unspecified essential hypertension.    Discharge Medications:  Current Outpatient Medications   Medication Sig Dispense Refill     acetaminophen (TYLENOL) 325 MG tablet Take 650 mg by mouth 2 times daily And 650 mg po tid       acetaminophen (TYLENOL) 325 MG tablet Take 650 mg by mouth every 6 hours as needed for pain        amiodarone (PACERONE/CODARONE) 200 MG tablet Take 1 tablet (200 mg) by mouth daily 30 tablet 0     AMLODIPINE BESYLATE PO Take 10 mg by mouth daily        aspirin 81 MG EC tablet Take 81 mg by mouth daily       clotrimazole (LOTRIMIN) 1 % cream Apply topically 2 times daily as needed       CRANBERRY-VIT C-LACTOBACILLUS PO Take 1 tablet by mouth daily       diclofenac (VOLTAREN) 1 % topical gel Place onto the skin 4 times daily       DULoxetine (CYMBALTA) 60 MG EC capsule Take 60 mg by mouth 2 times daily       ergocalciferol (ERGOCALCIFEROL) 14937 units capsule Take 50,000 Units by mouth once a week       ferrous sulfate (IRON) 325 (65 Fe) MG tablet Take 325 mg by mouth every other day       fluticasone (FLOVENT DISKUS) 50 MCG/BLIST AEPB Inhale 2 puffs into the lungs 2 times daily as needed       FOLIC ACID PO Take 1 mg by mouth daily       GABAPENTIN PO Take 300 mg by mouth 3 times daily        insulin aspart (NOVOLOG FLEXPEN) 100 UNIT/ML injection Inject 8 Units  Subcutaneous 2 times daily (with meals) Give with lunch and dinner       insulin glargine (LANTUS) 100 UNIT/ML injection Inject 15 Units Subcutaneous At Bedtime        Lactobacillus (PROBIOTIC ACIDOPHILUS PO) Take 2 tablets by mouth daily       latanoprost (XALATAN) 0.005 % ophthalmic solution Place 1 drop into both eyes At Bedtime       leflunomide (ARAVA) 20 MG tablet Take 1 tablet (20 mg) by mouth daily . Labs needed monthly 30 tablet 3     levETIRAcetam (KEPPRA) 500 MG tablet Take 500 mg by mouth 2 times daily       loperamide (IMODIUM A-D) 2 MG tablet Take 2 mg by mouth every 2 hours as needed for diarrhea       meclizine (ANTIVERT) 25 MG tablet Take 25 mg by mouth       MELATONIN PO Take 3 mg by mouth At Bedtime        menthol (ICY HOT) 5 % PTCH Apply 1 patch topically every 8 hours as needed for muscle soreness       METOPROLOL TARTRATE PO Take 100 mg by mouth 2 times daily        mupirocin (BACTROBAN) 2 % ointment Apply topically as needed       nitroglycerin (NITROSTAT) 0.4 MG sublingual tablet Place 0.4 mg under the tongue every 5 minutes as needed for chest pain For chest pain place 1 tablet under the tongue every 5 minutes for 3 doses. If symptoms persist 5 minutes after 1st dose call 911.       nystatin (MYCOSTATIN) 445389 UNIT/GM external powder Apply topically 2 times daily       OMEPRAZOLE PO Take 20 mg by mouth daily        ondansetron (ZOFRAN-ODT) 4 MG ODT tab Take 4 mg by mouth       POTASSIUM CHLORIDE PO Take 40 mEq by mouth daily       PRAVASTATIN SODIUM PO Take 40 mg by mouth every evening       senna-docusate (SENOKOT-S;PERICOLACE) 8.6-50 MG per tablet Take 1 tablet by mouth 2 times daily as needed        timolol (TIMOPTIC) 0.5 % ophthalmic solution Place 1 drop into both eyes 2 times daily Hold if HR <60       torsemide (DEMADEX) 20 MG tablet Take 20 mg by mouth       traMADol (ULTRAM) 50 MG tablet Take 1 tablet (50 mg) by mouth 2 times daily as needed for severe pain 30 tablet 0     TRAMADOL  HCL PO Take 50 mg by mouth every 8 hours as needed for moderate to severe pain       triamcinolone (KENALOG) 0.1 % cream Apply topically 2 times daily       Warfarin Sodium (COUMADIN PO) Take as directed by INR current dose 5mg PO Mon and Thur. And 3mg PO Tues, Wed, Fri, Sat, Sun next INR 1/2/18       amiodarone (PACERONE/CODARONE) 200 MG tablet Take 200 mg by mouth daily       FUROSEMIDE PO Take 40 mg by mouth 2 times daily       hydrocortisone (CORTAID) 1 % cream Apply topically 2 times daily as needed       Controlled medications sent with patient:   30-day supply of tramadol     ROS:   4 point ROS including Respiratory, CV, GI and , other than that noted in the HPI,  is negative    Physical Exam:   Vitals: /65   Pulse 60   Temp 99.4  F (37.4  C)   Resp 20   Wt 74.4 kg (164 lb)   SpO2 95%   BMI 27.29 kg/m     BMI= Body mass index is 27.29 kg/m .  General appearance alert in no distress    SNF labs: 5/10 sodium 142 potassium 3.2 glucose 143 creatinine 1.16 GFR 45.  5/13 potassium 3.6    DISCHARGE PLAN:    Follow up labs: No labs orders/due    Medical Follow Up:      Follow up with PCP in 2 weeks    MTM referral needed and placed by this provider: No    [facility] scheduled appointments:    Discharge Services: Home Care:  Occupational Therapy, Physical Therapy and Registered Nurse      TOTAL DISCHARGE TIME:   Greater than 30 minutes  Electronically signed by:  Manda Molina NP         Sincerely,        Manda Molina NP     .

## 2022-07-29 NOTE — ASSESSMENT & PLAN NOTE
Cont to diet and exercise Has several scabbed areas on her toes which she states is related to her falls.  We will have wound provider see her.

## 2023-06-15 NOTE — TELEPHONE ENCOUNTER
A user error has taken place: encounter opened in error, closed for administrative reasons.     Duration Of Freeze Thaw-Cycle (Seconds): 2 Post-Care Instructions: I reviewed with the patient in detail post-care instructions. Patient is to wear sunprotection, and avoid picking at any of the treated lesions. Pt may apply Vaseline to crusted or scabbing areas. Show Applicator Variable?: Yes Number Of Freeze-Thaw Cycles: 1 freeze-thaw cycle Detail Level: Detailed Consent: The patient's consent was obtained including but not limited to risks of crusting, scabbing, blistering, scarring, darker or lighter pigmentary change, recurrence, incomplete removal and infection. Render Post-Care Instructions In Note?: no

## 2024-06-17 PROBLEM — Z71.89 ADVANCED DIRECTIVES, COUNSELING/DISCUSSION: Status: RESOLVED | Noted: 2017-12-14 | Resolved: 2024-06-17
